# Patient Record
Sex: MALE | Race: BLACK OR AFRICAN AMERICAN | Employment: FULL TIME | URBAN - METROPOLITAN AREA
[De-identification: names, ages, dates, MRNs, and addresses within clinical notes are randomized per-mention and may not be internally consistent; named-entity substitution may affect disease eponyms.]

---

## 2017-01-19 ENCOUNTER — ED HISTORICAL/CONVERTED ENCOUNTER (OUTPATIENT)
Dept: OTHER | Age: 57
End: 2017-01-19

## 2017-05-26 ENCOUNTER — OP HISTORICAL/CONVERTED ENCOUNTER (OUTPATIENT)
Dept: OTHER | Age: 57
End: 2017-05-26

## 2017-09-08 ENCOUNTER — OP HISTORICAL/CONVERTED ENCOUNTER (OUTPATIENT)
Dept: OTHER | Age: 57
End: 2017-09-08

## 2018-05-01 ENCOUNTER — OP HISTORICAL/CONVERTED ENCOUNTER (OUTPATIENT)
Dept: OTHER | Age: 58
End: 2018-05-01

## 2018-06-05 ENCOUNTER — OP HISTORICAL/CONVERTED ENCOUNTER (OUTPATIENT)
Dept: OTHER | Age: 58
End: 2018-06-05

## 2018-06-21 ENCOUNTER — OP HISTORICAL/CONVERTED ENCOUNTER (OUTPATIENT)
Dept: OTHER | Age: 58
End: 2018-06-21

## 2018-06-27 ENCOUNTER — IP HISTORICAL/CONVERTED ENCOUNTER (OUTPATIENT)
Dept: OTHER | Age: 58
End: 2018-06-27

## 2018-07-18 ENCOUNTER — OP HISTORICAL/CONVERTED ENCOUNTER (OUTPATIENT)
Dept: OTHER | Age: 58
End: 2018-07-18

## 2019-01-04 ENCOUNTER — OP HISTORICAL/CONVERTED ENCOUNTER (OUTPATIENT)
Dept: OTHER | Age: 59
End: 2019-01-04

## 2019-02-19 ENCOUNTER — OP HISTORICAL/CONVERTED ENCOUNTER (OUTPATIENT)
Dept: OTHER | Age: 59
End: 2019-02-19

## 2020-03-26 ENCOUNTER — OP HISTORICAL/CONVERTED ENCOUNTER (OUTPATIENT)
Dept: OTHER | Age: 60
End: 2020-03-26

## 2020-08-26 PROBLEM — K21.9 GERD (GASTROESOPHAGEAL REFLUX DISEASE): Status: ACTIVE | Noted: 2020-08-26

## 2020-08-26 PROBLEM — R07.89 ATYPICAL CHEST PAIN: Status: ACTIVE | Noted: 2020-08-26

## 2020-08-26 PROBLEM — K22.9 DISORDER OF ESOPHAGUS: Status: ACTIVE | Noted: 2020-08-26

## 2020-08-26 PROBLEM — R60.9 EDEMA: Status: ACTIVE | Noted: 2020-08-26

## 2020-08-26 PROBLEM — I11.9 HYPERTENSIVE HEART DISEASE: Status: ACTIVE | Noted: 2020-08-26

## 2020-08-26 PROBLEM — B35.0 TINEA BARBAE: Status: ACTIVE | Noted: 2020-08-26

## 2020-08-26 PROBLEM — I25.10 CORONARY ARTERIOSCLEROSIS IN NATIVE ARTERY: Status: ACTIVE | Noted: 2020-08-26

## 2020-08-26 PROBLEM — E78.00 HYPERCHOLESTEROLEMIA: Status: ACTIVE | Noted: 2020-08-26

## 2020-08-26 PROBLEM — I10 ESSENTIAL HYPERTENSION: Status: ACTIVE | Noted: 2020-08-26

## 2020-08-26 PROBLEM — L25.9 CONTACT DERMATITIS: Status: ACTIVE | Noted: 2020-08-26

## 2020-08-26 PROBLEM — K59.00 CONSTIPATION: Status: ACTIVE | Noted: 2020-08-26

## 2020-08-26 PROBLEM — E55.9 VITAMIN D DEFICIENCY: Status: ACTIVE | Noted: 2020-08-26

## 2020-08-26 PROBLEM — M76.899 ENTHESOPATHY OF KNEE: Status: ACTIVE | Noted: 2020-08-26

## 2020-08-26 PROBLEM — E66.9 OBESITY: Status: ACTIVE | Noted: 2020-08-26

## 2020-08-27 ENCOUNTER — OFFICE VISIT (OUTPATIENT)
Dept: INTERNAL MEDICINE CLINIC | Age: 60
End: 2020-08-27
Payer: COMMERCIAL

## 2020-08-27 VITALS
HEART RATE: 67 BPM | SYSTOLIC BLOOD PRESSURE: 132 MMHG | OXYGEN SATURATION: 97 % | HEIGHT: 73 IN | WEIGHT: 295 LBS | BODY MASS INDEX: 39.1 KG/M2 | RESPIRATION RATE: 18 BRPM | TEMPERATURE: 98.1 F | DIASTOLIC BLOOD PRESSURE: 80 MMHG

## 2020-08-27 DIAGNOSIS — E66.3 OVERWEIGHT: ICD-10-CM

## 2020-08-27 DIAGNOSIS — I65.23 BILATERAL CAROTID ARTERY STENOSIS: Primary | ICD-10-CM

## 2020-08-27 DIAGNOSIS — R10.13 DYSPEPSIA: ICD-10-CM

## 2020-08-27 DIAGNOSIS — M54.12 CERVICAL RADICULAR PAIN: ICD-10-CM

## 2020-08-27 DIAGNOSIS — E78.00 PURE HYPERCHOLESTEROLEMIA: ICD-10-CM

## 2020-08-27 DIAGNOSIS — L30.8 OTHER ECZEMA: ICD-10-CM

## 2020-08-27 DIAGNOSIS — K59.09 CHRONIC CONSTIPATION: ICD-10-CM

## 2020-08-27 PROCEDURE — 99214 OFFICE O/P EST MOD 30 MIN: CPT | Performed by: INTERNAL MEDICINE

## 2020-08-27 RX ORDER — TIZANIDINE 2 MG/1
2 TABLET ORAL 2 TIMES DAILY
Qty: 30 TAB | Refills: 0 | Status: SHIPPED | OUTPATIENT
Start: 2020-08-27 | End: 2020-09-04

## 2020-08-27 RX ORDER — DICLOFENAC SODIUM 75 MG/1
75 TABLET, DELAYED RELEASE ORAL 2 TIMES DAILY
Qty: 30 TAB | Refills: 0 | Status: SHIPPED | OUTPATIENT
Start: 2020-08-27 | End: 2020-09-11

## 2020-08-27 RX ORDER — PANTOPRAZOLE SODIUM 40 MG/1
40 TABLET, DELAYED RELEASE ORAL DAILY
Qty: 90 TAB | Refills: 1 | Status: SHIPPED | OUTPATIENT
Start: 2020-08-27 | End: 2021-03-01

## 2020-08-27 NOTE — PROGRESS NOTES
Susu Yee is a 63 y.o. male and presents with Hypertension  Patient presents for follow-up has chronic constipation but does take Colace MiraLAX daily every Sunday and Thursday takes amities a 24 mcg along with Linzess 290 mcg when needed which is often for constant constipation it helps he is tries to drink plenty of fluids he does not smoke cigarettes he continues on atorvastatin bare aspirin a steroid cream for eczema around the scalp he has lost 6 to 7 pounds volitionally and will continue to watch his diet has pain along the lateral C-spine left side with symptoms of cervical radiculitis down the anterior shoulder seems to be worse with certain movements of the neck or arm he did not want MRI imaging has agreed to a trial of medication noted below local heat and rest also noted a vague tingling sensation in the right face upper anterior chest and neck area which he feels may be related to his known carotid arterial sclerosis he has stenosis significant on the right and was concerned about the left he is agreeable for repeat carotid duplex scanning I could not hear audible bruit he is a non-smoker he continues to be employed blood pressure 138/80 right arm O2 sat 97% pulse of 68 and regular continues with pantoprazole and needed a refill no longer takes tramadol continues on chlorthalidone carvedilol Plavix folic acid hydroxyzine lungs clear heart rate regular rate and rhythm very pleasant good peripheral pulses no gross lateralizing neurologic findings           Review of Systems  Constitutional: negative for fevers, chills, anorexia, weight loss and fatigue,no insomnia  Eyes:   negative for visual disturbance and irritation, eye discharge, eye pain. no eye redness. ENT:   negative for tinnitus, sore throat, nasal congestion, ear pain, hoarseness, hearing loss.,no snoring.   Respiratory:  negative for cough, hemoptysis, shortness of breath, wheezing,  CV:   negative for chest pain, palpitations, lower extremity edema, shortness of breath while sitting, walking or at night  GI:   negative for nausea, vomiting, diarrhea, abdominal pain,melena,noted constipation. Endo:               negative for polyuria, polydipsia, polyphagia, cold or heat intolerance,hair loss. Genitourinary: negative for frequency, dysuria and hematuria,urethral discharge,nocturia.straining while urination,urinary incontinence. Integument:  negative for rash and pruritus  Hematologic:  negative for easy bruising and gum/nose bleeding, enlarged nodes  Musculoskel: negative for myalgias, arthralgias, back pain, muscle weakness, joint pain, h/o fall,cramps,calf pain. cervical neck pain    Neurological:  negative for headaches, dizziness, vertigo, memory problems, gait and seizures loss of consciousness,no ataxia. -paresthesias of face  Behavl/Psych: negative for feelings of anxiety, depression, mood changes ,sadness    Past Medical History:   Diagnosis Date    Atypical chest pain 8/26/2020    Constipation 8/26/2020    Contact dermatitis 8/26/2020    Coronary arteriosclerosis in native artery 8/26/2020    Disorder of esophagus 8/26/2020    Edema 8/26/2020    Enthesopathy of knee 8/26/2020    Essential hypertension 8/26/2020    GERD (gastroesophageal reflux disease) 8/26/2020    Hypercholesterolemia 8/26/2020    Hypertensive heart disease 8/26/2020    Obesity 8/26/2020    Tinea barbae 8/26/2020    Vitamin D deficiency 8/26/2020     Past Surgical History:   Procedure Laterality Date    CARDIAC SURG PROCEDURE UNLIST      cardiac stent placement    HX HERNIA REPAIR      HX ORTHOPAEDIC      procedure on knee     Social History     Socioeconomic History    Marital status:      Spouse name: Not on file    Number of children: Not on file    Years of education: Not on file    Highest education level: Not on file   Tobacco Use    Smoking status: Former Smoker     Packs/day: 1.00     Years: 40.00     Pack years: 40.00    Smokeless tobacco: Never Used   Substance and Sexual Activity    Alcohol use: Not Currently     Family History   Problem Relation Age of Onset    Hypertension Mother     Hypertension Father        No Known Allergies    Objective:  Visit Vitals  /80 (BP 1 Location: Right arm, BP Patient Position: Sitting)   Pulse 67   Temp 98.1 °F (36.7 °C) (Oral)   Resp 18   Ht 6' 1\" (1.854 m)   Wt 295 lb (133.8 kg)   SpO2 97%   BMI 38.92 kg/m²       Physical Exam:   Constitutional: General Appearance: healthy-appearing and obese. Level of Distress: NAD. Ambulation: ambulating normally. overweight  Psychiatric: Mental Status: normal mood and affect and active and alert. Orientation: to time, place, and person. no agitation. ,normal eye contact. normal insight  Head: Head: normocephalic and atraumatic. Eyes: Pupils: PERRLA. Sclerae: non-icteric. ENMT: No lesions on external ear, no hearing loss. No lesions on external nose, sinus tenderness, or nasal discharge. Lips, Teeth, and no mouth or lip ulcers   Neck: Neck: supple, trachea midline, and no masses. Lymph Nodes: no cervical LAD. Thyroid: no enlargement or nodules and non-tender. Lungs: Respiratory effort: no dyspnea. Auscultation: no wheezing, rales/crackles, or rhonchi and breath sounds normal and good air movement. Breast:   Cardiovascular: Apical Impulse: not displaced. Heart Auscultation: normal S1 and S2; no murmurs, rubs, or gallops; and RRR. Neck vessels: no carotid bruits. Pulses including femoral / pedal: normal throughout. Abdomen: Bowel Sounds: normal. Inspection and Palpation: no tenderness, guarding, or masses and soft and non-distended. Liver: non-tender and no hepatomegaly. Spleen: non-tender and no splenomegaly. :    Rectal Exam:  Musculoskeletal[de-identified] Extremities: no edema or varicosities. Calf tenderness. djd changes  Neurologic: Gait and Station: normal gait and station. Motor Strength normal right and left. Sensory and cerebellar intact.   Skin: Inspection and palpation: no rash, lesions, or ulcer. eczema      No results found for this or any previous visit. Assessment/Plan:    ICD-10-CM ICD-9-CM    1. Bilateral carotid artery stenosis  I65.23 433.10 DUPLEX CAROTID BILATERAL     433.30    2. Cervical radicular pain  M54.12 723.4    3. Overweight  E66.3 278.02    4. Other eczema  L30.8 692.9    5. Pure hypercholesterolemia  E78.00 272.0    6. Chronic constipation  K59.09 564.00    7. Dyspepsia  R10.13 536.8      Orders Placed This Encounter    DUPLEX CAROTID BILATERAL     Standing Status:   Future     Standing Expiration Date:   2/27/2021    diclofenac EC (VOLTAREN) 75 mg EC tablet     Sig: Take 1 Tab by mouth two (2) times a day for 15 days. Dispense:  30 Tab     Refill:  0    tiZANidine (ZANAFLEX) 2 mg tablet     Sig: Take 1 Tab by mouth two (2) times a day for 15 days. Dispense:  30 Tab     Refill:  0    pantoprazole (PROTONIX) 40 mg tablet     Sig: Take 1 Tab by mouth daily for 90 days. Dispense:  90 Tab     Refill:  1       follow low fat diet, continue present plan, call if any problems    There are no Patient Instructions on file for this visit. Follow-up and Dispositions    · Return in about 6 months (around 2/27/2021).

## 2020-09-04 RX ORDER — TIZANIDINE 2 MG/1
TABLET ORAL
Qty: 30 TAB | Refills: 0 | Status: SHIPPED | OUTPATIENT
Start: 2020-09-04 | End: 2020-10-01 | Stop reason: SDUPTHER

## 2020-09-22 DIAGNOSIS — E87.6 HYPOKALEMIA: ICD-10-CM

## 2020-09-22 RX ORDER — POTASSIUM CHLORIDE 750 MG/1
CAPSULE, EXTENDED RELEASE ORAL
Qty: 90 CAP | Refills: 1 | Status: SHIPPED | OUTPATIENT
Start: 2020-09-22 | End: 2021-03-31

## 2020-09-30 ENCOUNTER — TELEPHONE (OUTPATIENT)
Dept: INTERNAL MEDICINE CLINIC | Age: 60
End: 2020-09-30

## 2020-09-30 DIAGNOSIS — M54.12 CERVICAL RADICULAR PAIN: Primary | ICD-10-CM

## 2020-09-30 NOTE — TELEPHONE ENCOUNTER
Needs a 90 day supply of tizanidine HCL 2mg tab     Quantity: 180.0     Directions for use:  Take 1 tablet po BID

## 2020-10-01 RX ORDER — TIZANIDINE 2 MG/1
TABLET ORAL
Qty: 60 TAB | Refills: 0 | Status: SHIPPED | OUTPATIENT
Start: 2020-10-01 | End: 2022-05-05 | Stop reason: CLARIF

## 2020-12-21 RX ORDER — CLOPIDOGREL BISULFATE 75 MG/1
TABLET ORAL
Qty: 90 TAB | Refills: 1 | Status: SHIPPED | OUTPATIENT
Start: 2020-12-21 | End: 2021-08-27

## 2021-01-01 RX ORDER — ATORVASTATIN CALCIUM 80 MG/1
TABLET, FILM COATED ORAL
Qty: 90 TAB | Refills: 1 | Status: SHIPPED | OUTPATIENT
Start: 2021-01-01 | End: 2021-07-08

## 2021-01-01 RX ORDER — CHLORTHALIDONE 25 MG/1
TABLET ORAL
Qty: 90 TAB | Refills: 1 | Status: SHIPPED | OUTPATIENT
Start: 2021-01-01 | End: 2021-10-11

## 2021-03-01 RX ORDER — PANTOPRAZOLE SODIUM 40 MG/1
TABLET, DELAYED RELEASE ORAL
Qty: 90 TAB | Refills: 1 | Status: SHIPPED | OUTPATIENT
Start: 2021-03-01 | End: 2021-04-30 | Stop reason: SDUPTHER

## 2021-03-26 ENCOUNTER — TELEPHONE (OUTPATIENT)
Dept: INTERNAL MEDICINE CLINIC | Age: 61
End: 2021-03-26

## 2021-03-26 DIAGNOSIS — L50.9 HIVES: Primary | ICD-10-CM

## 2021-03-26 RX ORDER — CYPROHEPTADINE HYDROCHLORIDE 4 MG/1
4 TABLET ORAL
Qty: 21 TAB | Refills: 0 | Status: SHIPPED | OUTPATIENT
Start: 2021-03-26 | End: 2021-04-16

## 2021-03-26 RX ORDER — DEXAMETHASONE 1 MG/1
TABLET ORAL
Qty: 6 TAB | Refills: 0 | Status: SHIPPED | OUTPATIENT
Start: 2021-03-26 | End: 2022-05-05 | Stop reason: CLARIF

## 2021-03-26 NOTE — TELEPHONE ENCOUNTER
Patient the covid vaccine a few days ago and now has hives. Is taking Benadryl, wants to know if there is anything he should be taking? Please advise.

## 2021-03-31 ENCOUNTER — OFFICE VISIT (OUTPATIENT)
Dept: INTERNAL MEDICINE CLINIC | Age: 61
End: 2021-03-31
Payer: COMMERCIAL

## 2021-03-31 VITALS
HEART RATE: 72 BPM | OXYGEN SATURATION: 99 % | WEIGHT: 307.6 LBS | BODY MASS INDEX: 40.77 KG/M2 | TEMPERATURE: 98.5 F | RESPIRATION RATE: 18 BRPM | SYSTOLIC BLOOD PRESSURE: 156 MMHG | HEIGHT: 73 IN | DIASTOLIC BLOOD PRESSURE: 74 MMHG

## 2021-03-31 DIAGNOSIS — R21 SKIN ERUPTION: ICD-10-CM

## 2021-03-31 DIAGNOSIS — I25.10 CORONARY ARTERIOSCLEROSIS IN NATIVE ARTERY: ICD-10-CM

## 2021-03-31 DIAGNOSIS — E66.3 OVERWEIGHT: ICD-10-CM

## 2021-03-31 DIAGNOSIS — E87.6 HYPOKALEMIA: ICD-10-CM

## 2021-03-31 DIAGNOSIS — I10 ESSENTIAL HYPERTENSION: Primary | ICD-10-CM

## 2021-03-31 DIAGNOSIS — K59.09 CHRONIC CONSTIPATION: ICD-10-CM

## 2021-03-31 DIAGNOSIS — E78.00 PURE HYPERCHOLESTEROLEMIA: ICD-10-CM

## 2021-03-31 PROCEDURE — 99214 OFFICE O/P EST MOD 30 MIN: CPT | Performed by: INTERNAL MEDICINE

## 2021-03-31 RX ORDER — POTASSIUM CHLORIDE 750 MG/1
CAPSULE, EXTENDED RELEASE ORAL
Qty: 90 CAP | Refills: 1 | Status: SHIPPED | OUTPATIENT
Start: 2021-03-31 | End: 2022-02-22 | Stop reason: SDUPTHER

## 2021-03-31 NOTE — PROGRESS NOTES
Carrie Parry is a 57 y.o. male and presents with Rash (has had a rash since taking the mad[place] covid shot)  Patient presents for routine visit has slightly pruritic eczematous type rash over the trunk back buttocks arms legs 4 days after the Glendy Ovens first vaccine he does not have fever chills does not feel bad it may be temporally related to the constituents of the vaccine he has been using some hydroxyzine which she has for hives and that seems to help the itch and he did not want to take any other medication we discussed steroids montelukast etc. he will let me know if he develops worsening rash or discomfort fever in fact over the last few days his rash is dissipating seem to be slightly raised erythematous. He is not due for his second [place] vaccine until April 26. Lungs are clear heart rate regular rate and rhythm he has seen Dr. Kim Purcell gastroenterologist about a month ago and had a PillCam performed but has not heard from Dr. Berny Contreras a while as to the results and was concerned he does have chronic constipation which is helped with those medications he currently takes. He continues on atorvastatin for hypercholesterolemia he will try to continue to lose weight although it has been difficult bilateral blood pressure resting 130/80 left arm otherwise he is doing fairly well           Review of Systems  Constitutional: negative for fevers, chills, anorexia, weight loss and fatigue,no insomnia  Eyes:   negative for visual disturbance and irritation, eye discharge, eye pain. no eye redness. ENT:   negative for tinnitus, sore throat, nasal congestion, ear pain, hoarseness, hearing loss.,no snoring. Respiratory:  negative for cough, hemoptysis, shortness of breath, wheezing,  CV:   negative for chest pain, palpitations, lower extremity edema, shortness of breath while sitting, walking or at night  GI:   negative for nausea, vomiting, diarrhea, abdominal pain,melena,constipation.   Endo:               negative for polyuria, polydipsia, polyphagia, cold or heat intolerance,hair loss. Genitourinary: negative for frequency, dysuria and hematuria,urethral discharge,nocturia.straining while urination,urinary incontinence. Integument:  Noted for rash and pruritus skin eruption  Hematologic:  negative for easy bruising and gum/nose bleeding, enlarged nodes  Musculoskel: negative for myalgias, arthralgias, back pain, muscle weakness, joint pain, h/o fall,cramps,calf pain. DJD  Neurological:  negative for headaches, dizziness, vertigo, memory problems, gait and seizures loss of consciousness,no ataxia.   Behavl/Psych: negative for feelings of anxiety, depression, mood changes ,sadness    Past Medical History:   Diagnosis Date    Atypical chest pain 2020    Constipation 2020    Contact dermatitis 2020    Coronary arteriosclerosis in native artery 2020    Disorder of esophagus 2020    Edema 2020    Enthesopathy of knee 2020    Essential hypertension 2020    GERD (gastroesophageal reflux disease) 2020    Hypercholesterolemia 2020    Hypertensive heart disease 2020    Obesity 2020    Tinea barbae 2020    Vitamin D deficiency 2020     Past Surgical History:   Procedure Laterality Date    HX HERNIA REPAIR      HX ORTHOPAEDIC      procedure on knee    [place] CARDIAC SURG PROCEDURE UNLIST      cardiac stent placement     Social History     Socioeconomic History    Marital status:      Spouse name: Not on file    Number of children: Not on file    Years of education: Not on file    Highest education level: Not on file   Tobacco Use    Smoking status: Former Smoker     Packs/day: 1.00     Years: 40.00     Pack years: 40.00     Quit date:      Years since quittin.2    Smokeless tobacco: Never Used   Substance and Sexual Activity    Alcohol use: Not Currently     Family History   Problem Relation Age of Onset    Hypertension Mother  Hypertension Father      Current Outpatient Medications   Medication Sig Dispense Refill    potassium chloride SA (MICRO-K) 10 mEq capsule TAKE 1 CAPSULE BY MOUTH EVERY DAY 90 Cap 1    cyproheptadine (PERIACTIN) 4 mg tablet Take 1 Tab by mouth three (3) times daily as needed (as needed) for up to 21 days. 21 Tab 0    dexAMETHasone (DECADRON) 1 mg tablet 1 tid for 3 days 6 Tab 0    pantoprazole (PROTONIX) 40 mg tablet TAKE 1 TABLET BY MOUTH EVERY DAY 90 Tab 1    chlorthalidone (HYGROTON) 25 mg tablet TAKE 1 TABLET BY MOUTH DAILY IN THE MORNING 90 Tab 1    atorvastatin (LIPITOR) 80 mg tablet TAKE 1 TABLET DAILY 90 Tab 1    clopidogreL (PLAVIX) 75 mg tab TAKE 1 TABLET BY MOUTH EVERY DAY 90 Tab 1    tiZANidine (ZANAFLEX) 2 mg tablet TAKE 1 TABLET BY MOUTH TWICE A DAY 60 Tab 0     No Known Allergies    Objective:  Visit Vitals  BP (!) 156/74 (BP 1 Location: Left arm, BP Patient Position: Sitting, BP Cuff Size: Large adult)   Pulse 72   Temp 98.5 °F (36.9 °C) (Oral)   Resp 18   Ht 6' 1\" (1.854 m)   Wt 307 lb 9.6 oz (139.5 kg)   SpO2 99%   BMI 40.58 kg/m²       Physical Exam:   Constitutional: General Appearance: healthy-appearing and obese. Level of Distress: NAD. Ambulation: ambulating normally. Overweight for height  Psychiatric: Mental Status: normal mood and affect and active and alert. Orientation: to time, place, and person. no agitation. ,normal eye contact. normal insight  Head: Head: normocephalic and atraumatic. Eyes: Pupils: PERRLA. Sclerae: non-icteric. ENMT: No lesions on external ear, no hearing loss. No lesions on external nose, sinus tenderness, or nasal discharge. Lips, Teeth, and no mouth or lip ulcers   Neck: Neck: supple, trachea midline, and no masses. Lymph Nodes: no cervical LAD. Thyroid: no enlargement or nodules and non-tender. Lungs: Respiratory effort: no dyspnea.  Auscultation: no wheezing, rales/crackles, or rhonchi and breath sounds normal and good air movement. Cardiovascular: Apical Impulse: not displaced. Heart Auscultation: normal S1 and S2; no murmurs, rubs, or gallops; and RRR. Neck vessels: no carotid bruits. Pulses including femoral / pedal: normal throughout. Abdomen: Bowel Sounds: normal. Inspection and Palpation: no tenderness, guarding, or masses and soft and non-distended. Liver: non-tender   Musculoskeletal[de-identified] Extremities: no edema or varicosities. Calf tenderness. DJD  Neurologic: Gait and Station: normal gait and station. Motor Strength normal right and left. Sensory and cerebellar intact. Skin: Inspection and palpation: Noted rash, lesions, or ulcer. No new skin eruption as above      No results found for this or any previous visit. Assessment/Plan:    ICD-10-CM ICD-9-CM    1. Essential hypertension  I10 401.9    2. Coronary arteriosclerosis in native artery  I25.10 414.01    3. Skin eruption  R21 782.1    4. Overweight  E66.3 278.02    5. Chronic constipation  K59.09 564.00    6. Pure hypercholesterolemia  E78.00 272.0      No orders of the defined types were placed in this encounter. call if any problems    There are no Patient Instructions on file for this visit. Follow-up and Dispositions    · Return in about 6 months (around 9/30/2021).

## 2021-03-31 NOTE — PROGRESS NOTES
1. Have you been to the ER, urgent care clinic since your last visit? Hospitalized since your last visit? No    2. Have you seen or consulted any other health care providers outside of the [address] [place] [address] since your last visit? Include any pap smears or colon screening.  No     Chief Complaint   Patient presents with    Rash     has had a rash since taking the maderna covid shot     Visit Vitals  BP (!) 158/74 (BP 1 Location: Left arm, BP Patient Position: Sitting, BP Cuff Size: Adult)   Pulse 72   Temp 98.5 °F (36.9 °C) (Oral)   Resp 18   Ht 6' 1\" (1.854 m)   Wt 307 lb 9.6 oz (139.5 kg)   SpO2 99%   BMI 40.58 kg/m²

## 2021-04-30 RX ORDER — PANTOPRAZOLE SODIUM 40 MG/1
TABLET, DELAYED RELEASE ORAL
Qty: 90 TAB | Refills: 1 | Status: SHIPPED | OUTPATIENT
Start: 2021-04-30 | End: 2022-02-05

## 2021-05-24 DIAGNOSIS — K59.00 CONSTIPATION, UNSPECIFIED: ICD-10-CM

## 2021-05-24 RX ORDER — LUBIPROSTONE 24 UG/1
24 CAPSULE, GELATIN COATED ORAL 2 TIMES DAILY WITH MEALS
Qty: 180 CAPSULE | Refills: 0 | Status: SHIPPED | OUTPATIENT
Start: 2021-05-24 | End: 2021-08-22

## 2021-07-08 RX ORDER — ATORVASTATIN CALCIUM 80 MG/1
TABLET, FILM COATED ORAL
Qty: 90 TABLET | Refills: 1 | Status: SHIPPED | OUTPATIENT
Start: 2021-07-08 | End: 2022-06-06 | Stop reason: SDUPTHER

## 2021-08-27 RX ORDER — CLOPIDOGREL BISULFATE 75 MG/1
TABLET ORAL
Qty: 90 TABLET | Refills: 1 | Status: SHIPPED | OUTPATIENT
Start: 2021-08-27 | End: 2021-08-31

## 2021-08-31 RX ORDER — CLOPIDOGREL BISULFATE 75 MG/1
TABLET ORAL
Qty: 90 TABLET | Refills: 1 | Status: SHIPPED | OUTPATIENT
Start: 2021-08-31 | End: 2021-12-03 | Stop reason: SDUPTHER

## 2021-10-11 RX ORDER — CHLORTHALIDONE 25 MG/1
TABLET ORAL
Qty: 90 TABLET | Refills: 1 | Status: SHIPPED | OUTPATIENT
Start: 2021-10-11 | End: 2022-04-18 | Stop reason: SDUPTHER

## 2021-10-11 RX ORDER — LUBIPROSTONE 24 UG/1
CAPSULE, GELATIN COATED ORAL
Qty: 180 CAPSULE | Refills: 1 | Status: SHIPPED | OUTPATIENT
Start: 2021-10-11

## 2021-11-23 DIAGNOSIS — K59.00 CONSTIPATION, UNSPECIFIED: ICD-10-CM

## 2021-11-23 RX ORDER — LINACLOTIDE 290 UG/1
CAPSULE, GELATIN COATED ORAL
Qty: 30 CAPSULE | Refills: 2 | Status: SHIPPED | OUTPATIENT
Start: 2021-11-23 | End: 2022-03-08 | Stop reason: SDUPTHER

## 2021-12-03 ENCOUNTER — TELEPHONE (OUTPATIENT)
Dept: INTERNAL MEDICINE CLINIC | Age: 61
End: 2021-12-03

## 2021-12-03 RX ORDER — CLOPIDOGREL BISULFATE 75 MG/1
75 TABLET ORAL DAILY
Qty: 90 TABLET | Refills: 1 | Status: SHIPPED | OUTPATIENT
Start: 2021-12-03 | End: 2022-06-02

## 2021-12-03 NOTE — TELEPHONE ENCOUNTER
Patient's wife called requesting a refill for Mr. Yandy Frances for the following medication:    clopidogreL (PLAVIX) 75 mg tab 90 Tablet 1 8/31/2021     Sig: TAKE 1 TABLET BY MOUTH EVERY DAY

## 2022-02-05 RX ORDER — PANTOPRAZOLE SODIUM 40 MG/1
TABLET, DELAYED RELEASE ORAL
Qty: 90 TABLET | Refills: 1 | Status: SHIPPED | OUTPATIENT
Start: 2022-02-05 | End: 2022-08-23 | Stop reason: SDUPTHER

## 2022-02-22 ENCOUNTER — TELEPHONE (OUTPATIENT)
Dept: INTERNAL MEDICINE CLINIC | Age: 62
End: 2022-02-22

## 2022-02-22 DIAGNOSIS — E87.6 HYPOKALEMIA: ICD-10-CM

## 2022-02-22 RX ORDER — POTASSIUM CHLORIDE 750 MG/1
10 CAPSULE, EXTENDED RELEASE ORAL DAILY
Qty: 90 CAPSULE | Refills: 1 | Status: SHIPPED | OUTPATIENT
Start: 2022-02-22 | End: 2022-08-23 | Stop reason: SDUPTHER

## 2022-02-22 NOTE — TELEPHONE ENCOUNTER
Patient's wife called for her  to request refill on Potassium 10mg with a 90 day supply sent to [facility] on [address]

## 2022-03-07 ENCOUNTER — TELEPHONE (OUTPATIENT)
Dept: INTERNAL MEDICINE CLINIC | Age: 62
End: 2022-03-07

## 2022-03-07 NOTE — TELEPHONE ENCOUNTER
Former Dr. Jasmyne Chance patient    [facility] faxed request for    Linzess 290 mcg cap  Take 1 cap by mouth every day as needed    LOV 3/31/2021 Sean MALONEY 5/25/2022 Zeny Burgos

## 2022-03-08 DIAGNOSIS — K59.00 CONSTIPATION, UNSPECIFIED: ICD-10-CM

## 2022-03-18 PROBLEM — K59.00 CONSTIPATION: Status: ACTIVE | Noted: 2020-08-26

## 2022-03-18 PROBLEM — M76.899 ENTHESOPATHY OF KNEE: Status: ACTIVE | Noted: 2020-08-26

## 2022-03-18 PROBLEM — R60.9 EDEMA: Status: ACTIVE | Noted: 2020-08-26

## 2022-03-19 PROBLEM — E55.9 VITAMIN D DEFICIENCY: Status: ACTIVE | Noted: 2020-08-26

## 2022-03-19 PROBLEM — R07.89 ATYPICAL CHEST PAIN: Status: ACTIVE | Noted: 2020-08-26

## 2022-03-19 PROBLEM — L25.9 CONTACT DERMATITIS: Status: ACTIVE | Noted: 2020-08-26

## 2022-03-19 PROBLEM — K22.9 DISORDER OF ESOPHAGUS: Status: ACTIVE | Noted: 2020-08-26

## 2022-03-19 PROBLEM — I25.10 CORONARY ARTERIOSCLEROSIS IN NATIVE ARTERY: Status: ACTIVE | Noted: 2020-08-26

## 2022-03-19 PROBLEM — E66.9 OBESITY: Status: ACTIVE | Noted: 2020-08-26

## 2022-03-19 PROBLEM — I10 ESSENTIAL HYPERTENSION: Status: ACTIVE | Noted: 2020-08-26

## 2022-03-19 PROBLEM — E78.00 HYPERCHOLESTEROLEMIA: Status: ACTIVE | Noted: 2020-08-26

## 2022-03-19 PROBLEM — K21.9 GERD (GASTROESOPHAGEAL REFLUX DISEASE): Status: ACTIVE | Noted: 2020-08-26

## 2022-03-19 PROBLEM — B35.0 TINEA BARBAE: Status: ACTIVE | Noted: 2020-08-26

## 2022-03-20 PROBLEM — I11.9 HYPERTENSIVE HEART DISEASE: Status: ACTIVE | Noted: 2020-08-26

## 2022-04-18 ENCOUNTER — TELEPHONE (OUTPATIENT)
Dept: INTERNAL MEDICINE CLINIC | Age: 62
End: 2022-04-18

## 2022-04-18 RX ORDER — CHLORTHALIDONE 25 MG/1
25 TABLET ORAL DAILY
Qty: 60 TABLET | Refills: 0 | Status: SHIPPED | OUTPATIENT
Start: 2022-04-18 | End: 2022-06-06 | Stop reason: SDUPTHER

## 2022-05-05 ENCOUNTER — HOSPITAL ENCOUNTER (OUTPATIENT)
Dept: PREADMISSION TESTING | Age: 62
Discharge: HOME | End: 2022-05-05
Payer: COMMERCIAL

## 2022-05-05 VITALS
HEIGHT: 72 IN | HEART RATE: 63 BPM | OXYGEN SATURATION: 99 % | RESPIRATION RATE: 18 BRPM | TEMPERATURE: 98.1 F | DIASTOLIC BLOOD PRESSURE: 95 MMHG | SYSTOLIC BLOOD PRESSURE: 178 MMHG | WEIGHT: 300.4 LBS | BODY MASS INDEX: 40.69 KG/M2

## 2022-05-05 LAB
ALBUMIN SERPL-MCNC: 3.6 G/DL (ref 3.5–5)
ALBUMIN/GLOB SERPL: 1 {RATIO} (ref 1.1–2.2)
ALP SERPL-CCNC: 89 U/L (ref 45–117)
ALT SERPL-CCNC: 25 U/L (ref 12–78)
ANION GAP SERPL CALC-SCNC: 8 MMOL/L (ref 5–15)
AST SERPL W P-5'-P-CCNC: 27 U/L (ref 15–37)
BILIRUB SERPL-MCNC: 0.5 MG/DL (ref 0.2–1)
BUN SERPL-MCNC: 10 MG/DL (ref 6–20)
BUN/CREAT SERPL: 10 (ref 12–20)
CA-I BLD-MCNC: 9.5 MG/DL (ref 8.5–10.1)
CHLORIDE SERPL-SCNC: 103 MMOL/L (ref 97–108)
CHOLEST SERPL-MCNC: 171 MG/DL
CO2 SERPL-SCNC: 25 MMOL/L (ref 21–32)
CREAT SERPL-MCNC: 0.96 MG/DL (ref 0.7–1.3)
ERYTHROCYTE [DISTWIDTH] IN BLOOD BY AUTOMATED COUNT: 18.4 % (ref 11.5–14.5)
GLOBULIN SER CALC-MCNC: 3.6 G/DL (ref 2–4)
GLUCOSE SERPL-MCNC: 142 MG/DL (ref 65–100)
HCT VFR BLD AUTO: 42.4 % (ref 36.6–50.3)
HDLC SERPL-MCNC: 39 MG/DL
HDLC SERPL: 4.4 {RATIO} (ref 0–5)
HGB BLD-MCNC: 13.1 G/DL (ref 12.1–17)
LDLC SERPL CALC-MCNC: 99.8 MG/DL (ref 0–100)
LIPID PROFILE,FLP: ABNORMAL
MCH RBC QN AUTO: 25.8 PG (ref 26–34)
MCHC RBC AUTO-ENTMCNC: 30.9 G/DL (ref 30–36.5)
MCV RBC AUTO: 83.5 FL (ref 80–99)
NRBC # BLD: 0 K/UL (ref 0–0.01)
NRBC BLD-RTO: 0 PER 100 WBC
PLATELET # BLD AUTO: 149 K/UL (ref 150–400)
POTASSIUM SERPL-SCNC: 4.1 MMOL/L (ref 3.5–5.1)
PROT SERPL-MCNC: 7.2 G/DL (ref 6.4–8.2)
RBC # BLD AUTO: 5.08 M/UL (ref 4.1–5.7)
SODIUM SERPL-SCNC: 136 MMOL/L (ref 136–145)
TRIGL SERPL-MCNC: 161 MG/DL (ref ?–150)
VLDLC SERPL CALC-MCNC: 32.2 MG/DL
WBC # BLD AUTO: 5 K/UL (ref 4.1–11.1)

## 2022-05-05 PROCEDURE — 80053 COMPREHEN METABOLIC PANEL: CPT

## 2022-05-05 PROCEDURE — 85027 COMPLETE CBC AUTOMATED: CPT

## 2022-05-05 PROCEDURE — 80061 LIPID PANEL: CPT

## 2022-05-05 PROCEDURE — 36415 COLL VENOUS BLD VENIPUNCTURE: CPT

## 2022-05-05 RX ORDER — CARVEDILOL 25 MG/1
25 TABLET ORAL 2 TIMES DAILY
COMMUNITY
Start: 2022-02-14

## 2022-05-05 RX ORDER — FOLIC ACID 1 MG/1
1 TABLET ORAL DAILY
COMMUNITY

## 2022-05-05 RX ORDER — LANOLIN ALCOHOL/MO/W.PET/CERES
1000 CREAM (GRAM) TOPICAL DAILY
COMMUNITY

## 2022-05-05 RX ORDER — HYDROXYZINE 25 MG/1
25 TABLET, FILM COATED ORAL
COMMUNITY
End: 2022-09-20 | Stop reason: SDUPTHER

## 2022-05-05 RX ORDER — ASPIRIN 81 MG/1
1 TABLET ORAL DAILY
COMMUNITY
Start: 2021-08-05

## 2022-05-05 RX ORDER — HYDROXYZINE HYDROCHLORIDE 10 MG/1
10 TABLET, FILM COATED ORAL
COMMUNITY
End: 2022-08-24 | Stop reason: SDUPTHER

## 2022-05-06 ENCOUNTER — HOSPITAL ENCOUNTER (OUTPATIENT)
Age: 62
Discharge: HOME | End: 2022-05-06
Attending: INTERNAL MEDICINE | Admitting: INTERNAL MEDICINE
Payer: COMMERCIAL

## 2022-05-06 VITALS
HEIGHT: 73 IN | BODY MASS INDEX: 39.76 KG/M2 | WEIGHT: 300 LBS | RESPIRATION RATE: 18 BRPM | HEART RATE: 73 BPM | SYSTOLIC BLOOD PRESSURE: 148 MMHG | OXYGEN SATURATION: 98 % | TEMPERATURE: 98.6 F | DIASTOLIC BLOOD PRESSURE: 83 MMHG

## 2022-05-06 DIAGNOSIS — I25.10 CORONARY ARTERY DISEASE INVOLVING NATIVE HEART WITHOUT ANGINA PECTORIS, UNSPECIFIED VESSEL OR LESION TYPE: ICD-10-CM

## 2022-05-06 PROBLEM — R07.9 CHEST PAIN: Status: ACTIVE | Noted: 2022-05-06

## 2022-05-06 LAB
ACT BLD: 233 SEC (ref 74–125)
ATRIAL RATE: 70 BPM
CALCULATED P AXIS, ECG09: 66 DEGREES
CALCULATED R AXIS, ECG10: -23 DEGREES
CALCULATED T AXIS, ECG11: -13 DEGREES
DIAGNOSIS, 93000: NORMAL
GLUCOSE BLD STRIP.AUTO-MCNC: 133 MG/DL (ref 65–117)
P-R INTERVAL, ECG05: 170 MS
PERFORMED BY, TECHID: ABNORMAL
PERFORMED BY, TECHID: ABNORMAL
Q-T INTERVAL, ECG07: 416 MS
QRS DURATION, ECG06: 108 MS
QTC CALCULATION (BEZET), ECG08: 449 MS
VENTRICULAR RATE, ECG03: 70 BPM

## 2022-05-06 PROCEDURE — 99153 MOD SED SAME PHYS/QHP EA: CPT | Performed by: INTERNAL MEDICINE

## 2022-05-06 PROCEDURE — 76210000025 HC REC RM PH II 3 TO 3.5 HR: Performed by: INTERNAL MEDICINE

## 2022-05-06 PROCEDURE — 93005 ELECTROCARDIOGRAM TRACING: CPT

## 2022-05-06 PROCEDURE — C1894 INTRO/SHEATH, NON-LASER: HCPCS | Performed by: INTERNAL MEDICINE

## 2022-05-06 PROCEDURE — C1760 CLOSURE DEV, VASC: HCPCS | Performed by: INTERNAL MEDICINE

## 2022-05-06 PROCEDURE — 82962 GLUCOSE BLOOD TEST: CPT

## 2022-05-06 PROCEDURE — 74011250636 HC RX REV CODE- 250/636: Performed by: INTERNAL MEDICINE

## 2022-05-06 PROCEDURE — 77030040934 HC CATH DIAG DXTERITY MEDT -A: Performed by: INTERNAL MEDICINE

## 2022-05-06 PROCEDURE — 77030013516 HC DEV INFL ANGI MRTM -B: Performed by: INTERNAL MEDICINE

## 2022-05-06 PROCEDURE — 74011000250 HC RX REV CODE- 250: Performed by: INTERNAL MEDICINE

## 2022-05-06 PROCEDURE — 77030029065 HC DRSG HEMO QCLOT ZMED -B: Performed by: INTERNAL MEDICINE

## 2022-05-06 PROCEDURE — 2709999900 HC NON-CHARGEABLE SUPPLY: Performed by: INTERNAL MEDICINE

## 2022-05-06 PROCEDURE — C1769 GUIDE WIRE: HCPCS | Performed by: INTERNAL MEDICINE

## 2022-05-06 PROCEDURE — 74011250637 HC RX REV CODE- 250/637: Performed by: INTERNAL MEDICINE

## 2022-05-06 PROCEDURE — C1874 STENT, COATED/COV W/DEL SYS: HCPCS | Performed by: INTERNAL MEDICINE

## 2022-05-06 PROCEDURE — 77030019698 HC SYR ANGI MDLON MRTM -A: Performed by: INTERNAL MEDICINE

## 2022-05-06 PROCEDURE — 77030008814 HC VLV ACC PLUS MRTM -B: Performed by: INTERNAL MEDICINE

## 2022-05-06 PROCEDURE — C1887 CATHETER, GUIDING: HCPCS | Performed by: INTERNAL MEDICINE

## 2022-05-06 PROCEDURE — 99152 MOD SED SAME PHYS/QHP 5/>YRS: CPT | Performed by: INTERNAL MEDICINE

## 2022-05-06 PROCEDURE — 93571 IV DOP VEL&/PRESS C FLO 1ST: CPT | Performed by: INTERNAL MEDICINE

## 2022-05-06 PROCEDURE — 77030042317 HC BND COMPR HEMSTAT -B: Performed by: INTERNAL MEDICINE

## 2022-05-06 PROCEDURE — 77030016699 HC CATH ANGI DX INFN1 CARD -A: Performed by: INTERNAL MEDICINE

## 2022-05-06 PROCEDURE — 76210000002 HC OR PH I REC 3 TO 3.5 HR: Performed by: INTERNAL MEDICINE

## 2022-05-06 PROCEDURE — 93458 L HRT ARTERY/VENTRICLE ANGIO: CPT | Performed by: INTERNAL MEDICINE

## 2022-05-06 PROCEDURE — 77030008542 HC TBNG MON PRSS EDWD -A: Performed by: INTERNAL MEDICINE

## 2022-05-06 PROCEDURE — 85347 COAGULATION TIME ACTIVATED: CPT

## 2022-05-06 PROCEDURE — 77030025703 HC SYR ANGI VACLOK MRTM -A: Performed by: INTERNAL MEDICINE

## 2022-05-06 PROCEDURE — 92928 PRQ TCAT PLMT NTRAC ST 1 LES: CPT | Performed by: INTERNAL MEDICINE

## 2022-05-06 PROCEDURE — 77030015766: Performed by: INTERNAL MEDICINE

## 2022-05-06 DEVICE — STENT COR DES 3.50X12MM -- DES RESOLUTE ONYX: Type: IMPLANTABLE DEVICE | Status: FUNCTIONAL

## 2022-05-06 RX ORDER — SODIUM CHLORIDE 9 MG/ML
100 INJECTION, SOLUTION INTRAVENOUS CONTINUOUS
Status: DISCONTINUED | OUTPATIENT
Start: 2022-05-06 | End: 2022-05-06 | Stop reason: HOSPADM

## 2022-05-06 RX ORDER — GUAIFENESIN 100 MG/5ML
81 LIQUID (ML) ORAL DAILY
Status: DISCONTINUED | OUTPATIENT
Start: 2022-05-06 | End: 2022-05-06 | Stop reason: HOSPADM

## 2022-05-06 RX ORDER — HEPARIN SODIUM 200 [USP'U]/100ML
INJECTION, SOLUTION INTRAVENOUS
Status: COMPLETED | OUTPATIENT
Start: 2022-05-06 | End: 2022-05-06

## 2022-05-06 RX ORDER — CLOPIDOGREL 300 MG/1
TABLET, FILM COATED ORAL AS NEEDED
Status: DISCONTINUED | OUTPATIENT
Start: 2022-05-06 | End: 2022-05-06 | Stop reason: HOSPADM

## 2022-05-06 RX ORDER — AMLODIPINE BESYLATE 5 MG/1
2.5 TABLET ORAL
Status: COMPLETED | OUTPATIENT
Start: 2022-05-06 | End: 2022-05-06

## 2022-05-06 RX ORDER — SODIUM CHLORIDE 9 MG/ML
60 INJECTION, SOLUTION INTRAVENOUS CONTINUOUS
Status: DISCONTINUED | OUTPATIENT
Start: 2022-05-06 | End: 2022-05-06 | Stop reason: HOSPADM

## 2022-05-06 RX ORDER — MIDAZOLAM HYDROCHLORIDE 1 MG/ML
INJECTION INTRAMUSCULAR; INTRAVENOUS AS NEEDED
Status: DISCONTINUED | OUTPATIENT
Start: 2022-05-06 | End: 2022-05-06 | Stop reason: HOSPADM

## 2022-05-06 RX ORDER — NALOXONE HYDROCHLORIDE 0.4 MG/ML
0.4 INJECTION, SOLUTION INTRAMUSCULAR; INTRAVENOUS; SUBCUTANEOUS AS NEEDED
Status: DISCONTINUED | OUTPATIENT
Start: 2022-05-06 | End: 2022-05-06 | Stop reason: HOSPADM

## 2022-05-06 RX ORDER — LIDOCAINE HYDROCHLORIDE 10 MG/ML
INJECTION INFILTRATION; PERINEURAL AS NEEDED
Status: DISCONTINUED | OUTPATIENT
Start: 2022-05-06 | End: 2022-05-06 | Stop reason: HOSPADM

## 2022-05-06 RX ORDER — CLOPIDOGREL BISULFATE 75 MG/1
75 TABLET ORAL DAILY
Status: DISCONTINUED | OUTPATIENT
Start: 2022-05-07 | End: 2022-05-06 | Stop reason: HOSPADM

## 2022-05-06 RX ORDER — FENTANYL CITRATE 50 UG/ML
INJECTION, SOLUTION INTRAMUSCULAR; INTRAVENOUS AS NEEDED
Status: DISCONTINUED | OUTPATIENT
Start: 2022-05-06 | End: 2022-05-06 | Stop reason: HOSPADM

## 2022-05-06 RX ORDER — ONDANSETRON 2 MG/ML
4 INJECTION INTRAMUSCULAR; INTRAVENOUS
Status: DISCONTINUED | OUTPATIENT
Start: 2022-05-06 | End: 2022-05-06 | Stop reason: HOSPADM

## 2022-05-06 RX ORDER — VERAPAMIL HYDROCHLORIDE 2.5 MG/ML
INJECTION, SOLUTION INTRAVENOUS AS NEEDED
Status: DISCONTINUED | OUTPATIENT
Start: 2022-05-06 | End: 2022-05-06 | Stop reason: HOSPADM

## 2022-05-06 RX ORDER — GUAIFENESIN 100 MG/5ML
LIQUID (ML) ORAL AS NEEDED
Status: DISCONTINUED | OUTPATIENT
Start: 2022-05-06 | End: 2022-05-06 | Stop reason: HOSPADM

## 2022-05-06 RX ORDER — HEPARIN SODIUM 1000 [USP'U]/ML
INJECTION, SOLUTION INTRAVENOUS; SUBCUTANEOUS AS NEEDED
Status: DISCONTINUED | OUTPATIENT
Start: 2022-05-06 | End: 2022-05-06 | Stop reason: HOSPADM

## 2022-05-06 RX ORDER — NITROGLYCERIN 5 MG/ML
INJECTION, SOLUTION INTRAVENOUS AS NEEDED
Status: DISCONTINUED | OUTPATIENT
Start: 2022-05-06 | End: 2022-05-06 | Stop reason: HOSPADM

## 2022-05-06 RX ORDER — ACETAMINOPHEN 325 MG/1
650 TABLET ORAL
Status: DISCONTINUED | OUTPATIENT
Start: 2022-05-06 | End: 2022-05-06 | Stop reason: HOSPADM

## 2022-05-06 RX ADMIN — SODIUM CHLORIDE 60 ML/HR: 9 INJECTION, SOLUTION INTRAVENOUS at 06:34

## 2022-05-06 RX ADMIN — AMLODIPINE BESYLATE 2.5 MG: 5 TABLET ORAL at 09:58

## 2022-05-06 NOTE — PROGRESS NOTES
Provided patient education about and explained the importance of medication compliance. Patient stated that he can afford the antiplatelet medication prescribed. Patient was advised that if the antiplatelet medication becomes unaffordable, he must notify the cardiologist immediately so that an alternate plan for antiplatelet therapy can be made. Patient stated that he has been taking clopidogrel and has some at home so that it will be available for the next scheduled dose after discharge. Patient asked appropriate questions and verbalized understanding during this consultation and was given printed teaching materials and my contact information in case I can provide further assistance. Spoke with patient about phase 2 outpatient cardiac rehab. Patient was given a choice of facilities to be enrolled and chose [facility]. Patient was scheduled for and made aware of, verbally and in writing, an initial evaluation appointment to begin cardiac rehab on May 31 at 2:30. Patient was made aware of this verbally and in writing. Patients information was forwarded to this facility. Patient verbalized understanding and was given printed teaching materials, my contact information in case he needs further assistance, and the address and phone number for the cardiac rehab facility to which he has been referred.

## 2022-05-06 NOTE — Clinical Note
Contrast Dose Calculator:   Patient's age: 60.   Patient's sex: Male. Patient weight (kg) = 136.1. Creatinine level (mg/dL) = 0.96. Creatinine clearance (mL/min): 152. Contrast concentration (mg/mL) = 370. MACD = 300 mL. Max Contrast dose per Creatinine Cl calculator = 342 mL.

## 2022-05-06 NOTE — DISCHARGE INSTRUCTIONS
Patient Education   Patient Education        Learning About Monitored Anesthesia Care (MAC)  What is monitored anesthesia care? Monitored anesthesia care (MAC) means that an anesthesia specialist will care for you during your surgery. He or she will make sure that you get only the level of anesthesia you need to prevent pain for your specific case. MAC can be used instead of always giving general or regional anesthesia. MAC is often combined with local anesthesia for smaller procedures. That means you will be numb in the area being operated on. Sedation with MAC can make you feel sleepy and relaxed. A doctor or a nurse who is trained to give anesthesia will closely watch you during surgery. He or she may adjust the medicine if needed so that you stay safe and comfortable. You may not remember much about the surgery after. There may be fewer side effects than with other types of anesthesia. It can also help you recover more quickly. What happens before surgery? An anesthesia specialist will talk to you before the surgery. He or she might ask about your health, past surgeries, medicine you take, and your family history, and your feelings about the surgery. He or she will help you decide if MAC is right for you. You will get an IV, which lets medicine into your vein through a tube. You may get:  · A sedative or anxiety medicine to help you relax. · Pain medicine. It can prevent pain during the surgery. You might also get a shot to make the area near the surgery numb. You will get a list of things to do to help you prepare for your surgery. You will learn about when to stop eating and drinking. If you take medicine, you will learn what you can and can't take before surgery. You will be asked to sign a form that says you understand the risks of anesthesia. Before you sign it, your doctor will talk with you about sedation with MAC. He or she may talk about other types of anesthesia as well.  You will learn about the risks and benefits of each type. You may be told that the doctor may need to change from MAC to general anesthesia during surgery to keep you comfortable and safe. Many people are nervous before they have surgery. Ask your doctor about ways to relax. These may include relaxation exercises or medicine. What are the risks of anesthesia? Major side effects aren't common. But all types of anesthesia have some risk. Your risk depends on your overall health. It also depends on the type of anesthesia you have and how you respond to it. Serious but rare risks include breathing problems, heart attack, stroke, and a bad reaction to the medicine. Some health conditions increase the risk of problems. Your doctor will find out about any health problems you have that may affect your care. Your doctor or nurse will closely watch your vital signs during surgery. This includes checking your breathing, blood pressure, and heart rate. This may help you avoid problems from anesthesia. What can you expect after having MAC? · Right after the surgery, you will be in the recovery room. Nurses will make sure you are safe and comfortable. · You may feel some of the effects of sedation with MAC for several hours. ? If you had local or regional anesthesia, you may feel numb and have less feeling in part of your body. It may also take a few hours for you to be able to move and control your muscles as usual.  ? If you had local anesthesia along with MAC, you may feel some pain and discomfort as the anesthetic wears off. Tell someone if you have pain. Pain medicine works better if you take it before the pain gets bad.  ? If your sedation with MAC was switched to general anesthesia during surgery, you may be confused. Or it may be hard to think clearly. This is normal. It may take some time before the effects are completely gone. · For minor surgeries, you may go home the same day.  For other surgeries, you may stay in the hospital. Your doctor will check on your recovery from the anesthesia. Your doctor will answer any questions you may have. · Don't do anything for 24 hours that requires attention to detail. This includes going to work or school, making important decisions, and signing any legal documents. It takes time for the medicine effects to completely wear off. · For your safety, do not drive or operate any machinery that could be dangerous until the medicine wears off and you can think clearly and react easily. Follow-up care is a key part of your treatment and safety. Be sure to make and go to all appointments, and call your doctor if you are having problems. It's also a good idea to know your test results and keep a list of the medicines you take. Where can you learn more? Go to http://www.gray.com/  Enter V730 in the search box to learn more about \"Learning About Monitored Anesthesia Care (MAC). \"  Current as of: February 11, 2021               Content Version: 13.2  © 2006-2022 [employer]. Care instructions adapted under license by [employer] (which disclaims liability or warranty for this information). If you have questions about a medical condition or this instruction, always ask your healthcare professional. [place] [address] any warranty or liability for your use of this information. Coronary Angioplasty: What to Expect at [facility]     Coronary angioplasty is a procedure that is used to open a narrowed or blocked coronary artery. It may also be called a percutaneous coronary intervention (PCI). The doctor opened your narrowed or blocked artery by putting a thin tube, called a catheter, into your heart through a blood vessel. The catheter was inserted into the blood vessel in your groin or wrist. The doctor may have placed a small tube, called a stent, in the artery.   Your groin or wrist may have a bruise and feel sore for a few days after the procedure. You can do light activities around the house. But don't do anything strenuous until your doctor says it is okay. This may be for several days. This care sheet gives you a general idea about how long it will take for you to recover. But each person recovers at a different pace. Follow the steps below to get better as quickly as possible. How can you care for yourself at home? Activity    · If the doctor gave you a sedative:  ? For 24 hours, don't do anything that requires attention to detail, such as going to work, making important decisions, or signing any legal documents. It takes time for the medicine's effects to completely wear off.  ? For your safety, do not drive or operate any machinery that could be dangerous. Wait until the medicine wears off and you can think clearly and react easily.     · Do not do strenuous exercise and do not lift, pull, or push anything heavy until your doctor says it is okay. This may be for several days. You can walk around the house and do light activity, such as cooking.     · If the catheter was placed in your groin, try not to walk up stairs for the first couple of days.     · If the catheter was placed in your arm near your wrist, do not bend your wrist deeply for the first couple of days. Be careful using your hand to get into and out of a chair or bed.     · Carry your stent identification card with you at all times.     · If your doctor recommends it, get more exercise. Walking is a good choice. Bit by bit, increase the amount you walk every day. Try for at least 30 minutes on most days of the week.     · If you haven't been set up with a cardiac rehab program, talk to your doctor about whether rehab is right for you. Cardiac rehab includes supervised exercise. It also includes help with diet and lifestyle changes and emotional support. Diet    · Drink plenty of fluids to help your body flush out the dye.  If you have kidney, heart, or liver disease and have to limit fluids, talk with your doctor before you increase the amount of fluids you drink.     · Keep eating a heart-healthy diet that has lots of fruits, vegetables, and whole grains. If you have not been eating this way, talk to your doctor. You also may want to talk to a dietitian. This expert can help you to learn about healthy foods and plan meals. Medicines    · Your doctor will tell you if and when you can restart your medicines. Your doctor will also give you instructions about taking any new medicines.     · If you take aspirin or some other blood thinner, ask your doctor if and when to start taking it again. Make sure that you understand exactly what your doctor wants you to do.     · Your doctor will prescribe blood-thinning medicines. You will likely take aspirin plus another antiplatelet, such as clopidogrel (Plavix). It is very important that you take these medicines exactly as directed. These medicines help keep the coronary artery open and reduce your risk of a heart attack.     · Call your doctor if you think you are having a problem with your medicine. Care of the catheter site    · For 1 or 2 days, keep a bandage over the spot where the catheter was inserted. The bandage probably will fall off in this time.     · Put ice or a cold pack on the area for 10 to 20 minutes at a time to help with soreness or swelling. Put a thin cloth between the ice and your skin.     · You may shower 24 to 48 hours after the procedure, if your doctor okays it. Pat the incision dry.     · Do not soak the catheter site until it is healed. Don't take a bath for 1 week, or until your doctor tells you it is okay.     · Watch for bleeding from the site. A small amount of blood (up to the size of a quarter) on the bandage can be normal.     · If you are bleeding, lie down and press on the area for 15 minutes to try to make it stop. If the bleeding does not stop, call your doctor or seek immediate medical care. Follow-up care is a key part of your treatment and safety. Be sure to make and go to all appointments, and call your doctor if you are having problems. It's also a good idea to know your test results and keep a list of the medicines you take. When should you call for help? Call 911 anytime you think you may need emergency care. For example, call if:    · You passed out (lost consciousness).     · You have severe trouble breathing.     · You have sudden chest pain and shortness of breath, or you cough up blood.     · You have symptoms of a heart attack, such as:  ? Chest pain or pressure. ? Sweating. ? Shortness of breath. ? Nausea or vomiting. ? Pain that spreads from the chest to the neck, jaw, or one or both shoulders or arms. ? Dizziness or lightheadedness. ? A fast or uneven pulse. After calling 911, chew 1 adult-strength aspirin. Wait for an ambulance. Do not try to drive yourself.     · You have been diagnosed with angina, and you have angina symptoms that do not go away with rest or are not getting better within 5 minutes after you take one dose of nitroglycerin. Call your doctor now or seek immediate medical care if:    · You are bleeding from the area where the catheter was put in your artery.     · You have a fast-growing, painful lump at the catheter site.     · You have signs of infection, such as:  ? Increased pain, swelling, warmth, or redness. ? Red streaks leading from the catheter site. ? Pus draining from the catheter site. ? A fever.     · Your leg or hand is painful, looks blue, or feels cold, numb, or tingly. Watch closely for changes in your health, and be sure to contact your doctor if you have any problems. Where can you learn more? Go to http://www.gray.com/  Enter T265 in the search box to learn more about \"Coronary Angioplasty: What to Expect at Home. \"  Current as of: January 10, 2022               Content Version: 13.2  © 3571-8092 [employer]. Care instructions adapted under license by [employer] (which disclaims liability or warranty for this information). If you have questions about a medical condition or this instruction, always ask your healthcare professional. [place] any warranty or liability for your use of this information. Cardiac Catheterization/Angiography Discharge Instructions    *Check the puncture site frequently for swelling or bleeding. If you see any bleeding, lie down and apply pressure over the area with a clean town or washcloth. Notify your doctor for any redness, swelling, drainage or oozing from the puncture site. Notify your doctor for any fever or chills. *If the leg or arm with the puncture becomes cold, numb or painful, call Dr [place] [facility] at  [facility]    *Activity should be limited for the next 48 hours. Climb stairs as little as possible and avoid any stooping, bending or strenuous activity for 48 hours. No heavy lifting (anything over 10 pounds) for three days. *Do not drive for 48 hours. *You may resume your usual diet. Drink more fluids than usual.    *Have a responsible person drive you home and stay with you for at least 24 hours after your heart catheterization/angiography. *You may remove the bandage from your {ARM/GROIN:92964} in 24 hours. You may shower in 24 hours. No tub baths, hot tubs or swimming for one week. Do not place any lotions, creams, powders, ointments over the puncture site for one week. You may place a clean band-aid over the puncture site each day for 5 days. Change this daily.

## 2022-05-06 NOTE — Clinical Note
History and physical documented and up to date, allergies reviewed, lab results reviewed, pre-procedure education provided, patient verbalized understanding of procedure, procedural consent signed and patient is NPO. Spoke to pt's mother. She stts pt is scheduled for surgery 7/24. Explained she may have nurse @ [address] contact us after Felyklever Leung has a lab draw, so we can fax results once avail & lab techs obtain specimens and do forward results.  Notified her that scr

## 2022-05-25 ENCOUNTER — OFFICE VISIT (OUTPATIENT)
Dept: FAMILY MEDICINE CLINIC | Age: 62
End: 2022-05-25
Payer: COMMERCIAL

## 2022-05-25 VITALS
WEIGHT: 300.8 LBS | TEMPERATURE: 98.4 F | RESPIRATION RATE: 18 BRPM | HEIGHT: 73 IN | HEART RATE: 89 BPM | OXYGEN SATURATION: 98 % | BODY MASS INDEX: 39.87 KG/M2 | DIASTOLIC BLOOD PRESSURE: 88 MMHG | SYSTOLIC BLOOD PRESSURE: 138 MMHG

## 2022-05-25 DIAGNOSIS — K58.1 IRRITABLE BOWEL SYNDROME WITH CONSTIPATION: ICD-10-CM

## 2022-05-25 DIAGNOSIS — I11.9 HYPERTENSIVE HEART DISEASE WITHOUT HEART FAILURE: ICD-10-CM

## 2022-05-25 DIAGNOSIS — I25.10 CORONARY ARTERIOSCLEROSIS IN NATIVE ARTERY: ICD-10-CM

## 2022-05-25 DIAGNOSIS — E66.01 CLASS 2 SEVERE OBESITY DUE TO EXCESS CALORIES WITH SERIOUS COMORBIDITY AND BODY MASS INDEX (BMI) OF 39.0 TO 39.9 IN ADULT (HCC): ICD-10-CM

## 2022-05-25 DIAGNOSIS — E55.9 VITAMIN D DEFICIENCY: Primary | ICD-10-CM

## 2022-05-25 DIAGNOSIS — K21.00 GASTROESOPHAGEAL REFLUX DISEASE WITH ESOPHAGITIS WITHOUT HEMORRHAGE: ICD-10-CM

## 2022-05-25 DIAGNOSIS — E78.00 HYPERCHOLESTEROLEMIA: ICD-10-CM

## 2022-05-25 PROCEDURE — 99214 OFFICE O/P EST MOD 30 MIN: CPT | Performed by: FAMILY MEDICINE

## 2022-05-25 NOTE — PROGRESS NOTES
Judith Lara is a 60 y.o. male and presents with New Patient (former patient of dr Zane Navarro), Hypertension, and GERD  . HPI   59 yo AAM with a hx of HTN and s/p cardiac stenting c/o constipation only relieved by Amitiza as needed(pt corrected and instructed to use daily)  Subjective:  Cardiovascular Review:  The patient has hypertension   Diet and Lifestyle: generally follows a low fat low cholesterol diet, generally follows a low sodium diet, exercises sporadically  Home BP Monitoring: is not measured at home. Pertinent ROS: taking medications as instructed, no medication side effects noted, no TIA's, no chest pain on exertion, no dyspnea on exertion, no swelling of ankles. Review of Systems  Review of Systems   Constitutional: Negative. Negative for chills and fever. HENT: Negative. Negative for congestion, ear discharge, hearing loss, nosebleeds and tinnitus. Eyes: Negative. Negative for blurred vision, double vision, photophobia and pain. Respiratory: Negative. Negative for cough, hemoptysis and sputum production. Cardiovascular: Negative. Negative for chest pain and palpitations. Gastrointestinal: Positive for constipation. Negative for heartburn, nausea and vomiting. Genitourinary: Negative. Negative for dysuria, frequency and urgency. Musculoskeletal: Negative. Negative for back pain and myalgias. Skin: Negative. Neurological: Negative. Negative for dizziness, tingling, weakness and headaches. Endo/Heme/Allergies: Negative. Psychiatric/Behavioral: Negative. Negative for depression and suicidal ideas. The patient does not have insomnia. All other systems reviewed and are negative.         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    Obesity 2020    Tinea barbae 2020    Vitamin D deficiency 2020     Past Surgical History:   Procedure Laterality Date    HX COLONOSCOPY      HX HEART CATHETERIZATION      cardiac stent placement    HX HEART CATHETERIZATION      HX HERNIA REPAIR      HX ORTHOPAEDIC Right     procedure on knee     Social History     Socioeconomic History    Marital status:    Tobacco Use    Smoking status: Former Smoker     Packs/day: 1.00     Years: 40.00     Pack years: 40.00     Quit date:      Years since quittin.4    Smokeless tobacco: Never Used   Vaping Use    Vaping Use: Never used   Substance and Sexual Activity    Alcohol use: Yes     Comment: occassionally    Drug use: Never     Social Determinants of Health     Food Insecurity: No Food Insecurity    Worried About Running Out of Food in the Last Year: Never true    Ran Out of Food in the Last Year: Never true   Physical Activity: Insufficiently Active    Days of Exercise per Week: 2 days    Minutes of Exercise per Session: 20 min   Social Connections:  Moderately Integrated    Frequency of Communication with Friends and Family: Twice a week    Frequency of Social Gatherings with Friends and Family: Twice a week    Attends Scientologist Services: 1 to 4 times per year    Active Member of [address] [place] [address] or Organizations: No    Attends Club or Organization Meetings: Never    Marital Status:    Housing Stability: [address] Unable to Pay for Housing in the Last Year: No    Number of [place] in the Last Year: 1    Unstable Housing in the Last Year: No     Family History   Problem Relation Age of Onset    Hypertension Mother     Diabetes Mother     High Cholesterol Mother     Hypertension Father     High Cholesterol Father      Current Outpatient Medications   Medication Sig Dispense Refill    folic acid (FOLVITE) 1 mg tablet Take 1 mg by mouth daily.  carvediloL (COREG) 25 mg tablet Take 25 mg by mouth two (2) times a day.  aspirin delayed-release 81 mg tablet Take 1 Tablet by mouth daily.  cyanocobalamin (Vitamin B-12) 1,000 mcg tablet Take 1,000 mcg by mouth daily.  hydrOXYzine HCL (ATARAX) 10 mg tablet Take 10 mg by mouth three (3) times daily as needed for Itching.  hydrOXYzine HCL (ATARAX) 25 mg tablet Take 25 mg by mouth three (3) times daily as needed for Itching.  chlorthalidone (HYGROTON) 25 mg tablet Take 1 Tablet by mouth daily. 60 Tablet 0    linaCLOtide (Linzess) 290 mcg cap capsule TAKE 1 CAPSULE BY MOUTH EVERY DAY AS NEEDED 30 Capsule 2    potassium chloride SA (MICRO-K) 10 mEq capsule Take 1 Capsule by mouth daily for 180 days. 90 Capsule 1    pantoprazole (PROTONIX) 40 mg tablet TAKE 1 TABLET BY MOUTH EVERY DAY 90 Tablet 1    clopidogreL (PLAVIX) 75 mg tab Take 1 Tablet by mouth daily for 180 days. 90 Tablet 1    lubiPROStone (AMITIZA) 24 mcg capsule TAKE 1 CAPSULE BY MOUTH TWICE DAILY WITH MEALS 180 Capsule 1    atorvastatin (LIPITOR) 80 mg tablet TAKE 1 TABLET BY MOUTH EVERY DAY 90 Tablet 1     No Known Allergies    Objective:  Visit Vitals  /88 (BP 1 Location: Right arm, BP Patient Position: Sitting, BP Cuff Size: Adult)   Pulse 89   Temp 98.4 °F (36.9 °C) (Oral)   Resp 18   Ht 6' 1\" (1.854 m)   Wt 300 lb 12.8 oz (136.4 kg)   SpO2 98%   BMI 39.69 kg/m²       Physical Exam:   Physical Exam  Vitals and nursing note reviewed. Constitutional:       General: He is not in acute distress. Appearance: Normal appearance. He is obese. He is not ill-appearing, toxic-appearing or diaphoretic. HENT:      Head: Normocephalic and atraumatic.       Right Ear: Tympanic membrane, ear canal and external ear normal. There is no impacted cerumen. Left Ear: Tympanic membrane, ear canal and external ear normal. There is no impacted cerumen. Nose: Nose normal. No congestion or rhinorrhea. Mouth/Throat:      Mouth: Mucous membranes are moist.      Pharynx: Oropharynx is clear. No oropharyngeal exudate or posterior oropharyngeal erythema. Eyes:      General: No scleral icterus. Right eye: No discharge. Left eye: No discharge. Extraocular Movements: Extraocular movements intact. Conjunctiva/sclera: Conjunctivae normal.      Pupils: Pupils are equal, round, and reactive to light. Neck:      Vascular: No carotid bruit. Cardiovascular:      Rate and Rhythm: Normal rate and regular rhythm. Pulses: Normal pulses. Heart sounds: Normal heart sounds. No murmur heard. No friction rub. No gallop. Pulmonary:      Effort: Pulmonary effort is normal. No respiratory distress. Breath sounds: Normal breath sounds. No stridor. No wheezing, rhonchi or rales. Chest:      Chest wall: No tenderness. Abdominal:      General: Abdomen is flat. Bowel sounds are normal. There is no distension. Palpations: Abdomen is soft. There is no mass. Tenderness: There is no abdominal tenderness. There is no right CVA tenderness, left CVA tenderness, guarding or rebound. Hernia: No hernia is present. Musculoskeletal:         General: No swelling, tenderness, deformity or signs of injury. Normal range of motion. Cervical back: Normal range of motion and neck supple. No rigidity. No muscular tenderness. Right lower leg: No edema. Left lower leg: No edema. Lymphadenopathy:      Cervical: No cervical adenopathy. Skin:     General: Skin is warm. Capillary Refill: Capillary refill takes 2 to 3 seconds. Coloration: Skin is not jaundiced or pale. Findings: No bruising, erythema, lesion or rash.    Neurological:      General: No focal deficit present. Mental Status: He is alert and oriented to person, place, and time. Mental status is at baseline. Cranial Nerves: No cranial nerve deficit. Sensory: No sensory deficit. Motor: No weakness. Coordination: Coordination normal.      Gait: Gait normal.      Deep Tendon Reflexes: Reflexes normal.   Psychiatric:         Mood and Affect: Mood normal.         Behavior: Behavior normal.         Thought Content: Thought content normal.         Judgment: Judgment normal.             Results for orders placed or performed during the hospital encounter of 05/06/22   GLUCOSE, POC   Result Value Ref Range    Glucose (POC) 133 (H) 65 - 117 mg/dL    Performed by [facility]    POC ACTIVATED CLOTTING TIME   Result Value Ref Range    Activated Clotting Time (POC) 233 (H) 74 - 125 sec    Performed by Mirella [facility]    EKG, 12 LEAD, INITIAL   Result Value Ref Range    Ventricular Rate 70 BPM    Atrial Rate 70 BPM    P-R Interval 170 ms    QRS Duration 108 ms    Q-T Interval 416 ms    QTC Calculation (Bezet) 449 ms    Calculated P Axis 66 degrees    Calculated R Axis -23 degrees    Calculated T Axis -13 degrees    Diagnosis       Normal sinus rhythm  Normal ECG    Confirmed by Tamara Bartholmoew MD, María Richardson ([id number]) on 5/6/2022 10:34:06 AM         Assessment/Plan:    ICD-10-CM ICD-9-CM    1. Vitamin D deficiency  E55.9 268.9    2. Gastroesophageal reflux disease with esophagitis without hemorrhage  K21.00 530.81      530.10    3. Coronary arteriosclerosis in native artery  I25.10 414.01    4. Hypertensive heart disease without heart failure  I11.9 402.90    5. Class 2 severe obesity due to excess calories with serious comorbidity and body mass index (BMI) of 39.0 to 39.9 in adult ([facility])  E66.01 278.01     Z68.39 V85.39    6. Irritable bowel syndrome with constipation  K58.1 564.1    7. Hypercholesterolemia  E78.00 272.0      No orders of the defined types were placed in this encounter.     Cannot display discharge medications since this is not an admission.

## 2022-05-25 NOTE — PATIENT INSTRUCTIONS
Low Sodium Diet (2,000 Milligram): Care Instructions  Overview     Limiting sodium can be an important part of managing some health problems. The most common source of sodium is salt. People get most of the salt in their diet from canned, prepared, and packaged foods. Fast food and restaurant meals also are very high in sodium. Your doctor will probably limit your sodium to less than 2,000 milligrams (mg) a day. This limit counts all the sodium in prepared and packaged foods and any salt you add to your food. Follow-up care is a key part of your treatment and safety. Be sure to make and go to all appointments, and call your doctor if you are having problems. It's also a good idea to know your test results and keep a list of the medicines you take. How can you care for yourself at home? Read food labels  · Read labels on cans and food packages. The labels tell you how much sodium is in each serving. Make sure that you look at the serving size. If you eat more than the serving size, you have eaten more sodium. · Food labels also tell you the Percent Daily Value for sodium. Choose products with low Percent Daily Values for sodium. · Be aware that sodium can come in forms other than salt, including monosodium glutamate (MSG), sodium citrate, and sodium bicarbonate (baking soda). MSG is often added to Asian food. When you eat out, you can sometimes ask for food without MSG or added salt. Buy low-sodium foods  · Buy foods that are labeled \"unsalted\" (no salt added), \"sodium-free\" (less than 5 mg of sodium per serving), or \"low-sodium\" (140 mg or less of sodium per serving). Foods labeled \"reduced-sodium\" and \"light sodium\" may still have too much sodium. Be sure to read the label to see how much sodium you are getting. · Buy fresh vegetables, or frozen vegetables without added sauces. Buy low-sodium versions of canned vegetables, soups, and other canned goods.   Prepare low-sodium meals  · Cut back on the amount of salt you use in cooking. This will help you adjust to the taste. Do not add salt after cooking. One teaspoon of salt has about 2,300 mg of sodium. · Take the salt shaker off the table. · Flavor your food with garlic, lemon juice, onion, vinegar, herbs, and spices. Do not use soy sauce, lite soy sauce, steak sauce, onion salt, garlic salt, celery salt, or ketchup on your food. · Use low-sodium salad dressings, sauces, and ketchup. Or make your own salad dressings and sauces without adding salt. · Use less salt (or none) when recipes call for it. You can often use half the salt a recipe calls for without losing flavor. Other foods such as rice, pasta, and grains do not need added salt. · Rinse canned vegetables, and cook them in fresh water. This removes some--but not all--of the salt. · Avoid water that is naturally high in sodium or that has been treated with water softeners, which add sodium. If you buy bottled water, read the label and choose a sodium-free brand. Avoid high-sodium foods  · Avoid eating:  ? Smoked, cured, salted, and canned meat, fish, and poultry. ? Ham, gomez, hot dogs, and luncheon meats. ? Regular, hard, and processed cheese and regular peanut butter. ? Crackers with salted tops, and other salted snack foods such as pretzels, chips, and salted popcorn. ? Frozen prepared meals, unless labeled low-sodium. ? Canned and dried soups, broths, and bouillon, unless labeled sodium-free or low-sodium. ? Canned vegetables, unless labeled sodium-free or low-sodium. ? Western Cate fries, pizza, tacos, and other fast foods. ? Pickles, olives, ketchup, and other condiments, especially soy sauce, unless labeled sodium-free or low-sodium. Where can you learn more? Go to http://www.gray.com/  Enter V843 in the search box to learn more about \"Low Sodium Diet (2,000 Milligram): Care Instructions. \"  Current as of: September 8, 2021               Content Version: 13.2  © 2006-2022 [employer]. Care instructions adapted under license by [employer] (which disclaims liability or warranty for this information). If you have questions about a medical condition or this instruction, always ask your healthcare professional. [address] any warranty or liability for your use of this information. Learning About Low-Fat Eating  What is low-fat eating? Most food has some fat in it. Your body needs some fat to be healthy. But some kinds of fats are healthier than others. In a low-fat eating plan, you try to choose healthier fats and eat fewer unhealthy fats. Healthy fats include olive and canola oil. Try to avoid eating too much saturated fat, such as in cheese and meats. You do not need to cut all fat from your diet. But you can make healthier choices about the types and amount of fat you eat. Even though it is a good idea to choose healthier fats, it is still important to be careful of how much fat you eat, because all fats are high in calories. What are the different types of fats? Unhealthy fat  · Saturated fat. Saturated fats are mostly in animal foods, such as meat and dairy foods. Tropical oils, such as coconut oil, palm oil, and cocoa butter, are also saturated fats. Healthy fats  · Monounsaturated fat. Monounsaturated fats are liquid at room temperature but get solid when refrigerated. Eating foods that are high in this fat may help lower your \"bad\" (LDL) cholesterol, keep your \"good\" (HDL) cholesterol level up, and lower your chances of getting coronary artery disease. This fat is found in canola oil, olive oil, peanut oil, olives, avocados, nuts, and nut butters. · Polyunsaturated fat. Polyunsaturated fats are liquid at room temperature. They are in safflower, sunflower, and corn oils. They are also the main fat in seafood. Omega-3 fatty acids are types of polyunsaturated fat.  Eating fish may lower your chances of getting coronary artery disease. Fatty fish such as salmon and mackerel contain these healthy fatty acids. So do ground flaxseeds and flaxseed oil, soybeans, walnuts, and seeds. Why cut down on unhealthy fats? Eating foods that contain saturated fats can raise the LDL (\"bad\") cholesterol in your blood. Having a high level of LDL cholesterol increases your chance of hardening of the arteries (atherosclerosis), which can lead to heart disease, heart attack, and stroke. In general:  · No more than 10% of your daily calories should come from saturated fat. This is about 20 grams in a 2,000-calorie diet. · No more than 10% of your daily calories should come from polyunsaturated fat. This is about 20 grams in a 2,000-calorie diet. · Monounsaturated fats can be up to 15% of your daily calories. This is about 25 to 30 grams in a 2,000-calorie diet. If you're not sure how much fat you should be eating or how many calories you need each day to stay at a healthy weight, talk to a registered dietitian. A dietitian can help you create a plan that's right for you. What can you do to cut down on fat? Foods like cheese, butter, sausage, and desserts can have a lot of unhealthy fats. Try these tips for healthier meals at home and when you eat out. At home  · Fill up on fruits, vegetables, and whole grains. · Think of meat as a side dish instead of as the main part of your meal.  · When you do eat meat, make it extra-lean ground beef (97% lean), ground turkey breast (without skin added), meats with fat trimmed off before cooking, or skinless chicken. · Try main dishes that use whole wheat pasta, brown rice, dried beans, or vegetables. · Use cooking methods that use little or no fat, such as broiling, steaming, or grilling. Use cooking spray instead of oil. If you use oil, use a monounsaturated oil, such as canola or olive oil. · Read food labels on canned, bottled, or packaged foods.  Choose those with little saturated fat.  When eating out at a restaurant  · Order foods that are broiled or poached instead of fried or breaded. · Cut back on the amount of butter or margarine that you use on bread. Use small amounts of olive oil instead. · Order sauces, gravies, and salad dressings on the side, and use only a little. · When you order pasta, choose tomato sauce instead of cream sauce. · Ask for salsa with your baked potato instead of sour cream, butter, cheese, or gomez. Where can you learn more? Go to http://www.gray.com/  Enter [id number] in the search box to learn more about \"Learning About Low-Fat Eating. \"  Current as of: September 8, 2021               Content Version: 13.2  © 2006-2022 [employer]. Care instructions adapted under license by [employer] (which disclaims liability or warranty for this information). If you have questions about a medical condition or this instruction, always ask your healthcare professional. [place] [address] any warranty or liability for your use of this information.

## 2022-05-25 NOTE — PROGRESS NOTES
1. \"Have you been to the ER, urgent care clinic since your last visit? Hospitalized since your last visit? \" Yes When: May 2022 Where: [facility] location Reason for visit: heart stent    2. \"Have you seen or consulted any other health care providers outside of the [address] [place] [address] since your last visit? \" No     3. For patients aged 39-70: Has the patient had a colonoscopy / FIT/ Cologuard? No      If the patient is female:    4. For patients aged 41-77: Has the patient had a mammogram within the past 2 years? NA - based on age or sex      11. For patients aged 21-65: Has the patient had a pap smear?  NA - based on age or sex     Chief Complaint   Patient presents with    New Patient     former patient of dr Teofilo Rizo Hypertension    GERD       Visit Vitals  /88 (BP 1 Location: Right arm, BP Patient Position: Sitting, BP Cuff Size: Adult)   Pulse 89   Temp 98.4 °F (36.9 °C) (Oral)   Resp 18   Ht 6' 1\" (1.854 m)   Wt 300 lb 12.8 oz (136.4 kg)   SpO2 98%   BMI 39.69 kg/m²       Patient is here as a new patient for Dr. Marcello Bertrand he is a former patient of dr. Alexei Cardozo,  of hypertension, Adeline Tenorio, patient was in the hospital May 2022 to have heart stents placed

## 2022-06-02 ENCOUNTER — TELEPHONE (OUTPATIENT)
Dept: INTERNAL MEDICINE CLINIC | Age: 62
End: 2022-06-02

## 2022-06-02 NOTE — TELEPHONE ENCOUNTER
[facility] faxed refill request for the following medication:      Atorvastatin 80 MG Tablet  QTY: 90  Refills: 1  Take 1 tablet by mouth every day          LOV 5- NOV 9-7-2022

## 2022-06-03 ENCOUNTER — HOSPITAL ENCOUNTER (OUTPATIENT)
Age: 62
Setting detail: OUTPATIENT SURGERY
Discharge: HOME | End: 2022-06-03
Attending: INTERNAL MEDICINE | Admitting: INTERNAL MEDICINE
Payer: COMMERCIAL

## 2022-06-03 VITALS
WEIGHT: 300 LBS | HEIGHT: 72 IN | BODY MASS INDEX: 40.63 KG/M2 | DIASTOLIC BLOOD PRESSURE: 83 MMHG | OXYGEN SATURATION: 98 % | RESPIRATION RATE: 18 BRPM | SYSTOLIC BLOOD PRESSURE: 140 MMHG | HEART RATE: 62 BPM | TEMPERATURE: 97.6 F

## 2022-06-03 DIAGNOSIS — R07.89 OTHER CHEST PAIN: ICD-10-CM

## 2022-06-03 LAB
ATRIAL RATE: 52 BPM
CALCULATED P AXIS, ECG09: 52 DEGREES
CALCULATED R AXIS, ECG10: -23 DEGREES
CALCULATED T AXIS, ECG11: -14 DEGREES
DIAGNOSIS, 93000: NORMAL
P-R INTERVAL, ECG05: 178 MS
Q-T INTERVAL, ECG07: 450 MS
QRS DURATION, ECG06: 102 MS
QTC CALCULATION (BEZET), ECG08: 418 MS
VENTRICULAR RATE, ECG03: 52 BPM

## 2022-06-03 PROCEDURE — C1760 CLOSURE DEV, VASC: HCPCS | Performed by: INTERNAL MEDICINE

## 2022-06-03 PROCEDURE — 93571 IV DOP VEL&/PRESS C FLO 1ST: CPT | Performed by: INTERNAL MEDICINE

## 2022-06-03 PROCEDURE — 2709999900 HC NON-CHARGEABLE SUPPLY: Performed by: INTERNAL MEDICINE

## 2022-06-03 PROCEDURE — 77030029997 HC DEV COM RDL R BND TELE -B: Performed by: INTERNAL MEDICINE

## 2022-06-03 PROCEDURE — 74011250636 HC RX REV CODE- 250/636: Performed by: INTERNAL MEDICINE

## 2022-06-03 PROCEDURE — C1894 INTRO/SHEATH, NON-LASER: HCPCS | Performed by: INTERNAL MEDICINE

## 2022-06-03 PROCEDURE — 77030040934 HC CATH DIAG DXTERITY MEDT -A: Performed by: INTERNAL MEDICINE

## 2022-06-03 PROCEDURE — 36415 COLL VENOUS BLD VENIPUNCTURE: CPT

## 2022-06-03 PROCEDURE — 76210000023 HC REC RM PH II 2 TO 2.5 HR: Performed by: INTERNAL MEDICINE

## 2022-06-03 PROCEDURE — 77030019698 HC SYR ANGI MDLON MRTM -A: Performed by: INTERNAL MEDICINE

## 2022-06-03 PROCEDURE — 74011000636 HC RX REV CODE- 636: Performed by: INTERNAL MEDICINE

## 2022-06-03 PROCEDURE — C1887 CATHETER, GUIDING: HCPCS | Performed by: INTERNAL MEDICINE

## 2022-06-03 PROCEDURE — 77030008817: Performed by: INTERNAL MEDICINE

## 2022-06-03 PROCEDURE — C1769 GUIDE WIRE: HCPCS | Performed by: INTERNAL MEDICINE

## 2022-06-03 PROCEDURE — 99153 MOD SED SAME PHYS/QHP EA: CPT | Performed by: INTERNAL MEDICINE

## 2022-06-03 PROCEDURE — 77030008542 HC TBNG MON PRSS EDWD -A: Performed by: INTERNAL MEDICINE

## 2022-06-03 PROCEDURE — 77030029065 HC DRSG HEMO QCLOT ZMED -B: Performed by: INTERNAL MEDICINE

## 2022-06-03 PROCEDURE — 76210000000 HC OR PH I REC 2 TO 2.5 HR: Performed by: INTERNAL MEDICINE

## 2022-06-03 PROCEDURE — 77030016699 HC CATH ANGI DX INFN1 CARD -A: Performed by: INTERNAL MEDICINE

## 2022-06-03 PROCEDURE — 77030008814 HC VLV ACC PLUS MRTM -B: Performed by: INTERNAL MEDICINE

## 2022-06-03 PROCEDURE — 74011000250 HC RX REV CODE- 250: Performed by: INTERNAL MEDICINE

## 2022-06-03 PROCEDURE — 93005 ELECTROCARDIOGRAM TRACING: CPT

## 2022-06-03 PROCEDURE — 74011250637 HC RX REV CODE- 250/637: Performed by: INTERNAL MEDICINE

## 2022-06-03 PROCEDURE — 93458 L HRT ARTERY/VENTRICLE ANGIO: CPT | Performed by: INTERNAL MEDICINE

## 2022-06-03 PROCEDURE — 99152 MOD SED SAME PHYS/QHP 5/>YRS: CPT | Performed by: INTERNAL MEDICINE

## 2022-06-03 RX ORDER — GUAIFENESIN 100 MG/5ML
LIQUID (ML) ORAL AS NEEDED
Status: DISCONTINUED | OUTPATIENT
Start: 2022-06-03 | End: 2022-06-03 | Stop reason: HOSPADM

## 2022-06-03 RX ORDER — VERAPAMIL HYDROCHLORIDE 2.5 MG/ML
INJECTION, SOLUTION INTRAVENOUS AS NEEDED
Status: DISCONTINUED | OUTPATIENT
Start: 2022-06-03 | End: 2022-06-03 | Stop reason: HOSPADM

## 2022-06-03 RX ORDER — NALOXONE HYDROCHLORIDE 0.4 MG/ML
0.4 INJECTION, SOLUTION INTRAMUSCULAR; INTRAVENOUS; SUBCUTANEOUS AS NEEDED
Status: DISCONTINUED | OUTPATIENT
Start: 2022-06-03 | End: 2022-06-03 | Stop reason: HOSPADM

## 2022-06-03 RX ORDER — ACETAMINOPHEN 325 MG/1
650 TABLET ORAL
Status: DISCONTINUED | OUTPATIENT
Start: 2022-06-03 | End: 2022-06-03 | Stop reason: HOSPADM

## 2022-06-03 RX ORDER — NITROGLYCERIN 5 MG/ML
INJECTION, SOLUTION INTRAVENOUS AS NEEDED
Status: DISCONTINUED | OUTPATIENT
Start: 2022-06-03 | End: 2022-06-03 | Stop reason: HOSPADM

## 2022-06-03 RX ORDER — ONDANSETRON 2 MG/ML
4 INJECTION INTRAMUSCULAR; INTRAVENOUS
Status: DISCONTINUED | OUTPATIENT
Start: 2022-06-03 | End: 2022-06-03 | Stop reason: HOSPADM

## 2022-06-03 RX ORDER — SODIUM CHLORIDE 9 MG/ML
60 INJECTION, SOLUTION INTRAVENOUS CONTINUOUS
Status: DISCONTINUED | OUTPATIENT
Start: 2022-06-03 | End: 2022-06-03 | Stop reason: HOSPADM

## 2022-06-03 RX ORDER — HEPARIN SODIUM 1000 [USP'U]/ML
INJECTION, SOLUTION INTRAVENOUS; SUBCUTANEOUS AS NEEDED
Status: DISCONTINUED | OUTPATIENT
Start: 2022-06-03 | End: 2022-06-03 | Stop reason: HOSPADM

## 2022-06-03 RX ORDER — HEPARIN SODIUM 200 [USP'U]/100ML
INJECTION, SOLUTION INTRAVENOUS
Status: COMPLETED | OUTPATIENT
Start: 2022-06-03 | End: 2022-06-03

## 2022-06-03 RX ORDER — MIDAZOLAM HYDROCHLORIDE 1 MG/ML
INJECTION INTRAMUSCULAR; INTRAVENOUS AS NEEDED
Status: DISCONTINUED | OUTPATIENT
Start: 2022-06-03 | End: 2022-06-03 | Stop reason: HOSPADM

## 2022-06-03 RX ORDER — LIDOCAINE HYDROCHLORIDE 10 MG/ML
INJECTION INFILTRATION; PERINEURAL AS NEEDED
Status: DISCONTINUED | OUTPATIENT
Start: 2022-06-03 | End: 2022-06-03 | Stop reason: HOSPADM

## 2022-06-03 RX ORDER — FENTANYL CITRATE 50 UG/ML
INJECTION, SOLUTION INTRAMUSCULAR; INTRAVENOUS AS NEEDED
Status: DISCONTINUED | OUTPATIENT
Start: 2022-06-03 | End: 2022-06-03 | Stop reason: HOSPADM

## 2022-06-03 RX ORDER — SODIUM CHLORIDE 9 MG/ML
100 INJECTION, SOLUTION INTRAVENOUS CONTINUOUS
Status: DISCONTINUED | OUTPATIENT
Start: 2022-06-03 | End: 2022-06-03 | Stop reason: HOSPADM

## 2022-06-03 RX ORDER — OXYCODONE AND ACETAMINOPHEN 5; 325 MG/1; MG/1
1 TABLET ORAL
Status: DISCONTINUED | OUTPATIENT
Start: 2022-06-03 | End: 2022-06-03 | Stop reason: HOSPADM

## 2022-06-03 RX ORDER — GUAIFENESIN 100 MG/5ML
81 LIQUID (ML) ORAL DAILY
Status: DISCONTINUED | OUTPATIENT
Start: 2022-06-04 | End: 2022-06-03 | Stop reason: HOSPADM

## 2022-06-03 RX ORDER — CLOPIDOGREL BISULFATE 75 MG/1
75 TABLET ORAL DAILY
Status: DISCONTINUED | OUTPATIENT
Start: 2022-06-04 | End: 2022-06-03 | Stop reason: HOSPADM

## 2022-06-03 RX ORDER — CLOPIDOGREL BISULFATE 75 MG/1
TABLET ORAL AS NEEDED
Status: DISCONTINUED | OUTPATIENT
Start: 2022-06-03 | End: 2022-06-03 | Stop reason: HOSPADM

## 2022-06-03 RX ADMIN — SODIUM CHLORIDE 60 ML/HR: 9 INJECTION, SOLUTION INTRAVENOUS at 10:54

## 2022-06-03 NOTE — Clinical Note
Contrast Dose Calculator:   Patient's age: 60.   Patient's sex: Male. Patient weight (kg) = 182.9. Creatinine level (mg/dL) = 0.96. Creatinine clearance (mL/min): 204. Contrast concentration (mg/mL) = 370. MACD = 300 mL. Max Contrast dose per Creatinine Cl calculator = 459 mL.

## 2022-06-03 NOTE — ROUTINE PROCESS
Attempted to call spouse per patient request.  No answer at telephone number provided. 0 - family member contact number updated in chart.   Spoke with wife Jimbo Bright and updated her regarding patient status

## 2022-06-03 NOTE — Clinical Note
TRANSFER - OUT REPORT:     Verbal report given to: Vani (at bedside). Report consisted of patient's Situation, Background, Assessment and   Recommendations(SBAR). Opportunity for questions and clarification was provided. Patient transported with a Registered Nurse. Patient transported to: recovery.

## 2022-06-03 NOTE — PROGRESS NOTES
PT AMBULATED IN HALLWAY , TOLERATED WELL , DRESSING LEFT WRIST DRY AND INTACT , DRESSING RIGHT GROIN DRY AND INTACT IV DC'D SITE INTACT NO SWELLING NOTED DISCHARGE INSTRUCTIONS GIVEN PT VERBALIZED UNDERSTANDING NO DISTRESS NOTED

## 2022-06-06 RX ORDER — ATORVASTATIN CALCIUM 80 MG/1
80 TABLET, FILM COATED ORAL DAILY
Qty: 90 TABLET | Refills: 0 | Status: SHIPPED | OUTPATIENT
Start: 2022-06-06 | End: 2022-09-04

## 2022-06-06 RX ORDER — CHLORTHALIDONE 25 MG/1
25 TABLET ORAL DAILY
Qty: 60 TABLET | Refills: 0 | Status: SHIPPED | OUTPATIENT
Start: 2022-06-06 | End: 2022-08-17

## 2022-08-17 ENCOUNTER — TELEPHONE (OUTPATIENT)
Dept: INTERNAL MEDICINE CLINIC | Age: 62
End: 2022-08-17

## 2022-08-17 RX ORDER — CHLORTHALIDONE 25 MG/1
TABLET ORAL
Qty: 30 TABLET | Refills: 1 | Status: SHIPPED | OUTPATIENT
Start: 2022-08-17 | End: 2022-09-11

## 2022-08-22 ENCOUNTER — TELEPHONE (OUTPATIENT)
Dept: INTERNAL MEDICINE CLINIC | Age: 62
End: 2022-08-22

## 2022-08-22 DIAGNOSIS — E87.6 HYPOKALEMIA: ICD-10-CM

## 2022-08-22 NOTE — TELEPHONE ENCOUNTER
[facility] faxed refill request for the following medication:      Potassium CL ER 10 MEQ Capsule  QTY: 90  Take 1 capsule by mouth daily for 180 days          LOV 5- NOV 9-7-2022

## 2022-08-23 RX ORDER — PANTOPRAZOLE SODIUM 40 MG/1
40 TABLET, DELAYED RELEASE ORAL DAILY
Qty: 90 TABLET | Refills: 1 | Status: SHIPPED | OUTPATIENT
Start: 2022-08-23

## 2022-08-23 RX ORDER — POTASSIUM CHLORIDE 750 MG/1
10 CAPSULE, EXTENDED RELEASE ORAL DAILY
Qty: 90 CAPSULE | Refills: 1 | Status: SHIPPED | OUTPATIENT
Start: 2022-08-23 | End: 2023-02-19

## 2022-08-24 ENCOUNTER — TELEPHONE (OUTPATIENT)
Dept: FAMILY MEDICINE CLINIC | Age: 62
End: 2022-08-24

## 2022-08-24 RX ORDER — HYDROXYZINE HYDROCHLORIDE 10 MG/1
10 TABLET, FILM COATED ORAL
Qty: 45 TABLET | Refills: 0 | Status: SHIPPED | OUTPATIENT
Start: 2022-08-24 | End: 2022-09-08

## 2022-09-04 RX ORDER — ATORVASTATIN CALCIUM 80 MG/1
TABLET, FILM COATED ORAL
Qty: 90 TABLET | Refills: 0 | Status: SHIPPED | OUTPATIENT
Start: 2022-09-04

## 2022-09-07 ENCOUNTER — OFFICE VISIT (OUTPATIENT)
Dept: FAMILY MEDICINE CLINIC | Age: 62
End: 2022-09-07
Payer: COMMERCIAL

## 2022-09-07 VITALS
SYSTOLIC BLOOD PRESSURE: 139 MMHG | BODY MASS INDEX: 40.61 KG/M2 | OXYGEN SATURATION: 95 % | HEIGHT: 72 IN | TEMPERATURE: 98.8 F | WEIGHT: 299.8 LBS | RESPIRATION RATE: 16 BRPM | DIASTOLIC BLOOD PRESSURE: 81 MMHG | HEART RATE: 86 BPM

## 2022-09-07 DIAGNOSIS — E55.9 VITAMIN D DEFICIENCY: Primary | ICD-10-CM

## 2022-09-07 DIAGNOSIS — I25.10 CORONARY ARTERIOSCLEROSIS IN NATIVE ARTERY: ICD-10-CM

## 2022-09-07 DIAGNOSIS — I11.9 HYPERTENSIVE HEART DISEASE WITHOUT HEART FAILURE: ICD-10-CM

## 2022-09-07 DIAGNOSIS — K21.00 GASTROESOPHAGEAL REFLUX DISEASE WITH ESOPHAGITIS WITHOUT HEMORRHAGE: ICD-10-CM

## 2022-09-07 DIAGNOSIS — E78.2 MIXED HYPERLIPIDEMIA: ICD-10-CM

## 2022-09-07 PROCEDURE — 99214 OFFICE O/P EST MOD 30 MIN: CPT | Performed by: FAMILY MEDICINE

## 2022-09-07 NOTE — PROGRESS NOTES
Lashell Chapa is a 60 y.o. male and presents with Follow Up Chronic Condition  . HPI     61 yo AAM with a hx of HTN and obesity here on follow up stating s/p syncopal episode 3 weeks ago with work up showing complete carotid and other head stenosis-advised surgery for this will be too risky-pt to bring results in  Admits to intermittent right facial and upper and lower extremity numbness 6 times a week  Subjective:  Cardiovascular Review:  The patient has hypertension   Diet and Lifestyle: generally follows a low fat low cholesterol diet, generally follows a low sodium diet, exercises sporadically  Home BP Monitoring: is not measured at home. Pertinent ROS: taking medications as instructed, no medication side effects noted, no TIA's, no chest pain on exertion, no dyspnea on exertion, no swelling of ankles. Review of Systems  Review of Systems   Constitutional: Negative. Negative for chills and fever. HENT: Negative. Negative for congestion, ear discharge, hearing loss, nosebleeds and tinnitus. Eyes: Negative. Negative for blurred vision, double vision, photophobia and pain. Respiratory: Negative. Negative for cough, hemoptysis and sputum production. Cardiovascular: Negative. Negative for chest pain and palpitations. Gastrointestinal: Negative. Negative for heartburn, nausea and vomiting. Genitourinary: Negative. Negative for dysuria, frequency and urgency. Musculoskeletal: Negative. Negative for back pain and myalgias. Skin: Negative. Neurological:  Positive for dizziness, tingling and sensory change. Negative for weakness and headaches. Endo/Heme/Allergies: Negative. Psychiatric/Behavioral: Negative. Negative for depression and suicidal ideas. The patient does not have insomnia. All other systems reviewed and are negative.       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    Obesity 2020    Tinea barbae 2020    Vitamin D deficiency 2020     Past Surgical History:   Procedure Laterality Date    HX COLONOSCOPY      HX HEART CATHETERIZATION      cardiac stent placement    HX HEART CATHETERIZATION      HX HERNIA REPAIR      HX ORTHOPAEDIC Right     procedure on knee     Social History     Socioeconomic History    Marital status:    Tobacco Use    Smoking status: Former     Packs/day: 1.00     Years: 40.00     Pack years: 40.00     Types: Cigarettes     Quit date:      Years since quittin.6    Smokeless tobacco: Never   Vaping Use    Vaping Use: Never used   Substance and Sexual Activity    Alcohol use: Yes     Comment: occassionally    Drug use: Never     Social Determinants of Health     Financial Resource Strain: Low Risk     Difficulty of Paying Living Expenses: Not very hard   Food Insecurity: No Food Insecurity    Worried About Running Out of Food in the Last Year: Never true    Ran Out of Food in the Last Year: Never true   Physical Activity: Insufficiently Active    Days of Exercise per Week: 2 days    Minutes of Exercise per Session: 20 min   Social Connections:  Moderately Integrated    Frequency of Communication with Friends and Family: Twice a week    Frequency of Social Gatherings with Friends and Family: Twice a week    Attends [employer] Services: 1 to 4 times per year    Active Member of [address] [place] [address] [employer] or Organizations: No    Attends Club or Organization Meetings: Never    Marital Status:    Housing Stability: [address] to Pay for Housing in the Last Year: No    Number of Places Lived in the Last Year: 1    Unstable Housing in the Last Year: No     Family History   Problem Relation Age of Onset    Hypertension Mother     Diabetes Mother     High Cholesterol Mother     Hypertension Father     High Cholesterol Father      Current Outpatient Medications   Medication Sig Dispense Refill    atorvastatin (LIPITOR) 80 mg tablet TAKE 1 TABLET BY MOUTH EVERY DAY 90 Tablet 0    hydrOXYzine HCL (ATARAX) 10 mg tablet Take 1 Tablet by mouth three (3) times daily as needed for Itching for up to 15 days. 45 Tablet 0    potassium chloride SA (MICRO-K) 10 mEq capsule Take 1 Capsule by mouth daily for 180 days. 90 Capsule 1    pantoprazole (PROTONIX) 40 mg tablet Take 1 Tablet by mouth daily. 90 Tablet 1    chlorthalidone (HYGROTON) 25 mg tablet TAKE 1 TABLET BY MOUTH EVERY DAY 30 Tablet 1    folic acid (FOLVITE) 1 mg tablet Take 1 mg by mouth daily. carvediloL (COREG) 25 mg tablet Take 25 mg by mouth two (2) times a day. aspirin delayed-release 81 mg tablet Take 1 Tablet by mouth daily. cyanocobalamin 1,000 mcg tablet Take 1,000 mcg by mouth daily. linaCLOtide (Linzess) 290 mcg cap capsule TAKE 1 CAPSULE BY MOUTH EVERY DAY AS NEEDED 30 Capsule 2    lubiPROStone (AMITIZA) 24 mcg capsule TAKE 1 CAPSULE BY MOUTH TWICE DAILY WITH MEALS 180 Capsule 1    hydrOXYzine HCL (ATARAX) 25 mg tablet Take 25 mg by mouth three (3) times daily as needed for Itching. (Patient not taking: No sig reported)       No Known Allergies    Objective:  Visit Vitals  /81 (BP 1 Location: Right upper arm, BP Patient Position: Sitting, BP Cuff Size: Adult)   Pulse 86   Temp 98.8 °F (37.1 °C) (Oral)   Resp 16   Ht 6' (1.829 m)   Wt 299 lb 12.8 oz (136 kg)   SpO2 95%   BMI 40.66 kg/m²       Physical Exam:   Physical Exam  Vitals and nursing note reviewed. Constitutional:       General: He is not in acute distress. Appearance: Normal appearance. He is obese.  He is not ill-appearing, toxic-appearing or diaphoretic. HENT:      Head: Normocephalic and atraumatic. Right Ear: Tympanic membrane, ear canal and external ear normal. There is no impacted cerumen. Left Ear: Tympanic membrane, ear canal and external ear normal. There is no impacted cerumen. Nose: Nose normal. No congestion or rhinorrhea. Mouth/Throat:      Mouth: Mucous membranes are moist.      Pharynx: Oropharynx is clear. No oropharyngeal exudate or posterior oropharyngeal erythema. Eyes:      General: No scleral icterus. Right eye: No discharge. Left eye: No discharge. Extraocular Movements: Extraocular movements intact. Conjunctiva/sclera: Conjunctivae normal.      Pupils: Pupils are equal, round, and reactive to light. Neck:      Vascular: No carotid bruit. Cardiovascular:      Rate and Rhythm: Normal rate and regular rhythm. Pulses: Normal pulses. Heart sounds: Normal heart sounds. No murmur heard. No friction rub. No gallop. Pulmonary:      Effort: Pulmonary effort is normal. No respiratory distress. Breath sounds: Normal breath sounds. No stridor. No wheezing, rhonchi or rales. Chest:      Chest wall: No tenderness. Abdominal:      General: Abdomen is flat. Bowel sounds are normal. There is no distension. Palpations: Abdomen is soft. There is no mass. Tenderness: There is no abdominal tenderness. There is no right CVA tenderness, left CVA tenderness, guarding or rebound. Hernia: No hernia is present. Musculoskeletal:         General: No swelling, tenderness, deformity or signs of injury. Normal range of motion. Cervical back: Normal range of motion and neck supple. No rigidity. No muscular tenderness. Right lower leg: No edema. Left lower leg: No edema. Lymphadenopathy:      Cervical: No cervical adenopathy. Skin:     General: Skin is warm.       Capillary Refill: Capillary refill takes 2 to 3 seconds. Coloration: Skin is not jaundiced or pale. Findings: No bruising, erythema, lesion or rash. Neurological:      General: No focal deficit present. Mental Status: He is alert and oriented to person, place, and time. Mental status is at baseline. Cranial Nerves: No cranial nerve deficit. Sensory: No sensory deficit. Motor: No weakness. Coordination: Coordination normal.      Gait: Gait normal.      Deep Tendon Reflexes: Reflexes normal.   Psychiatric:         Mood and Affect: Mood normal.         Behavior: Behavior normal.         Thought Content: Thought content normal.         Judgment: Judgment normal.           Results for orders placed or performed during the hospital encounter of 06/03/22   EKG, 12 LEAD, INITIAL   Result Value Ref Range    Ventricular Rate 52 BPM    Atrial Rate 52 BPM    P-R Interval 178 ms    QRS Duration 102 ms    Q-T Interval 450 ms    QTC Calculation (Bezet) 418 ms    Calculated P Axis 52 degrees    Calculated R Axis -23 degrees    Calculated T Axis -14 degrees    Diagnosis       Sinus bradycardia  Otherwise normal ECG  When compared with ECG of 06-MAY-2022 09:42,  No significant change was found  Confirmed by NIRMALA DUARTE, Josefina Cohn ([id number]) on 6/3/2022 3:00:03 PM         Assessment/Plan:    ICD-10-CM ICD-9-CM    1. Vitamin D deficiency  E55.9 268.9       2. Hypertensive heart disease without heart failure  I11.9 402.90       3. Gastroesophageal reflux disease with esophagitis without hemorrhage  K21.00 530.81      530.10       4. Coronary arteriosclerosis in native artery  I25.10 414.01       5. Mixed hyperlipidemia  E78.2 272.2         No orders of the defined types were placed in this encounter. Cannot display discharge medications since this is not an admission.

## 2022-09-07 NOTE — PROGRESS NOTES
1. \"Have you been to the ER, urgent care clinic since your last visit? Hospitalized since your last visit? \" Yes When: 3 weeks ago  Where: [facility]  Reason for visit: passed out in yard    2. \"Have you seen or consulted any other health care providers outside of the [address] [place] [address] since your last visit? \" No     3. For patients aged 39-70: Has the patient had a colonoscopy / FIT/ Cologuard? No      If the patient is female:    4. For patients aged 41-77: Has the patient had a mammogram within the past 2 years? NA - based on age or sex      11. For patients aged 21-65: Has the patient had a pap smear?  NA - based on age or sex     Chief Complaint   Patient presents with    Follow Up Chronic Condition     Visit Vitals  /81 (BP 1 Location: Right upper arm, BP Patient Position: Sitting, BP Cuff Size: Adult)   Pulse 86   Temp 98.8 °F (37.1 °C) (Oral)   Resp 16   Ht 6' (1.829 m)   Wt 299 lb 12.8 oz (136 kg)   SpO2 95%   BMI 40.66 kg/m²     Pt is here for a follow up, says he passed out in his yard 3 weeks ago as well but went to the hospital.

## 2022-09-11 RX ORDER — CHLORTHALIDONE 25 MG/1
TABLET ORAL
Qty: 30 TABLET | Refills: 1 | Status: SHIPPED | OUTPATIENT
Start: 2022-09-11 | End: 2022-10-05

## 2022-09-20 RX ORDER — HYDROXYZINE 25 MG/1
25 TABLET, FILM COATED ORAL
Qty: 45 TABLET | Refills: 0 | Status: SHIPPED | OUTPATIENT
Start: 2022-09-20 | End: 2022-10-05 | Stop reason: SDUPTHER

## 2022-10-05 RX ORDER — CHLORTHALIDONE 25 MG/1
TABLET ORAL
Qty: 30 TABLET | Refills: 1 | Status: SHIPPED | OUTPATIENT
Start: 2022-10-05

## 2022-10-05 RX ORDER — HYDROXYZINE 25 MG/1
25 TABLET, FILM COATED ORAL
Qty: 45 TABLET | Refills: 0 | Status: SHIPPED | OUTPATIENT
Start: 2022-10-05 | End: 2022-10-20

## 2022-10-17 ENCOUNTER — DOCUMENTATION ONLY (OUTPATIENT)
Dept: FAMILY MEDICINE CLINIC | Age: 62
End: 2022-10-17

## 2022-11-11 ENCOUNTER — TRANSCRIBE ORDER (OUTPATIENT)
Dept: SCHEDULING | Age: 62
End: 2022-11-11

## 2022-11-11 DIAGNOSIS — D50.9 IRON DEFICIENCY ANEMIA, UNSPECIFIED: Primary | ICD-10-CM

## 2022-11-11 DIAGNOSIS — D64.9 ANEMIA, UNSPECIFIED: ICD-10-CM

## 2022-12-05 RX ORDER — CHLORTHALIDONE 25 MG/1
TABLET ORAL
Qty: 30 TABLET | Refills: 1 | Status: SHIPPED | OUTPATIENT
Start: 2022-12-05

## 2022-12-05 RX ORDER — ATORVASTATIN CALCIUM 80 MG/1
TABLET, FILM COATED ORAL
Qty: 90 TABLET | Refills: 0 | Status: SHIPPED | OUTPATIENT
Start: 2022-12-05

## 2023-01-05 ENCOUNTER — HOSPITAL ENCOUNTER (INPATIENT)
Age: 63
LOS: 2 days | Discharge: HOME | End: 2023-01-07
Attending: STUDENT IN AN ORGANIZED HEALTH CARE EDUCATION/TRAINING PROGRAM | Admitting: INTERNAL MEDICINE
Payer: COMMERCIAL

## 2023-01-05 ENCOUNTER — APPOINTMENT (OUTPATIENT)
Dept: CT IMAGING | Age: 63
End: 2023-01-05
Attending: INTERNAL MEDICINE
Payer: COMMERCIAL

## 2023-01-05 ENCOUNTER — APPOINTMENT (OUTPATIENT)
Dept: GENERAL RADIOLOGY | Age: 63
End: 2023-01-05
Attending: STUDENT IN AN ORGANIZED HEALTH CARE EDUCATION/TRAINING PROGRAM
Payer: COMMERCIAL

## 2023-01-05 DIAGNOSIS — R77.8 ELEVATED TROPONIN: ICD-10-CM

## 2023-01-05 DIAGNOSIS — R07.9 CHEST PAIN, UNSPECIFIED TYPE: ICD-10-CM

## 2023-01-05 DIAGNOSIS — J81.0 ACUTE PULMONARY EDEMA (HCC): Primary | ICD-10-CM

## 2023-01-05 DIAGNOSIS — I10 HYPERTENSION, UNSPECIFIED TYPE: ICD-10-CM

## 2023-01-05 PROBLEM — I50.9 ACUTE CHF (HCC): Status: ACTIVE | Noted: 2023-01-05

## 2023-01-05 LAB
ANION GAP SERPL CALC-SCNC: 9 MMOL/L (ref 5–15)
ATRIAL RATE: 102 BPM
BASOPHILS # BLD: 0 K/UL (ref 0–0.1)
BASOPHILS NFR BLD: 0 % (ref 0–1)
BNP SERPL-MCNC: 400 PG/ML
BUN SERPL-MCNC: 23 MG/DL (ref 6–20)
BUN/CREAT SERPL: 22 (ref 12–20)
CA-I BLD-MCNC: 9.6 MG/DL (ref 8.5–10.1)
CALCULATED P AXIS, ECG09: 58 DEGREES
CALCULATED R AXIS, ECG10: -34 DEGREES
CALCULATED T AXIS, ECG11: 64 DEGREES
CHLORIDE SERPL-SCNC: 104 MMOL/L (ref 97–108)
CO2 SERPL-SCNC: 27 MMOL/L (ref 21–32)
COVID-19 RAPID TEST, COVR: NOT DETECTED
CREAT SERPL-MCNC: 1.06 MG/DL (ref 0.7–1.3)
DIAGNOSIS, 93000: NORMAL
DIFFERENTIAL METHOD BLD: ABNORMAL
EOSINOPHIL # BLD: 0.2 K/UL (ref 0–0.4)
EOSINOPHIL NFR BLD: 2 % (ref 0–7)
ERYTHROCYTE [DISTWIDTH] IN BLOOD BY AUTOMATED COUNT: 22.7 % (ref 11.5–14.5)
GLUCOSE SERPL-MCNC: 225 MG/DL (ref 65–100)
HCT VFR BLD AUTO: 46.1 % (ref 36.6–50.3)
HGB BLD-MCNC: 13.8 G/DL (ref 12.1–17)
IMM GRANULOCYTES # BLD AUTO: 0 K/UL (ref 0–0.04)
IMM GRANULOCYTES NFR BLD AUTO: 0 % (ref 0–0.5)
LYMPHOCYTES # BLD: 1.8 K/UL (ref 0.8–3.5)
LYMPHOCYTES NFR BLD: 15 % (ref 12–49)
MAGNESIUM SERPL-MCNC: 2.4 MG/DL (ref 1.6–2.4)
MCH RBC QN AUTO: 25.7 PG (ref 26–34)
MCHC RBC AUTO-ENTMCNC: 29.9 G/DL (ref 30–36.5)
MCV RBC AUTO: 85.7 FL (ref 80–99)
MONOCYTES # BLD: 0.6 K/UL (ref 0–1)
MONOCYTES NFR BLD: 5 % (ref 5–13)
NEUTS SEG # BLD: 9.6 K/UL (ref 1.8–8)
NEUTS SEG NFR BLD: 78 % (ref 32–75)
NRBC # BLD: 0 K/UL (ref 0–0.01)
NRBC BLD-RTO: 0 PER 100 WBC
P-R INTERVAL, ECG05: 142 MS
PLATELET # BLD AUTO: 140 K/UL (ref 150–400)
POTASSIUM SERPL-SCNC: 4.4 MMOL/L (ref 3.5–5.1)
PROCALCITONIN SERPL-MCNC: 0.13 NG/ML
Q-T INTERVAL, ECG07: 362 MS
QRS DURATION, ECG06: 90 MS
QTC CALCULATION (BEZET), ECG08: 471 MS
RBC # BLD AUTO: 5.38 M/UL (ref 4.1–5.7)
SODIUM SERPL-SCNC: 140 MMOL/L (ref 136–145)
TROPONIN-HIGH SENSITIVITY: 221 NG/L (ref 0–76)
TROPONIN-HIGH SENSITIVITY: 253 NG/L (ref 0–76)
TROPONIN-HIGH SENSITIVITY: 259 NG/L (ref 0–76)
TROPONIN-HIGH SENSITIVITY: 86 NG/L (ref 0–76)
VENTRICULAR RATE, ECG03: 102 BPM
WBC # BLD AUTO: 12.2 K/UL (ref 4.1–11.1)

## 2023-01-05 PROCEDURE — 74011000250 HC RX REV CODE- 250: Performed by: INTERNAL MEDICINE

## 2023-01-05 PROCEDURE — 84145 PROCALCITONIN (PCT): CPT

## 2023-01-05 PROCEDURE — 84484 ASSAY OF TROPONIN QUANT: CPT

## 2023-01-05 PROCEDURE — 74011250636 HC RX REV CODE- 250/636: Performed by: INTERNAL MEDICINE

## 2023-01-05 PROCEDURE — 71045 X-RAY EXAM CHEST 1 VIEW: CPT

## 2023-01-05 PROCEDURE — 87635 SARS-COV-2 COVID-19 AMP PRB: CPT

## 2023-01-05 PROCEDURE — 74011250637 HC RX REV CODE- 250/637: Performed by: INTERNAL MEDICINE

## 2023-01-05 PROCEDURE — 80048 BASIC METABOLIC PNL TOTAL CA: CPT

## 2023-01-05 PROCEDURE — 83735 ASSAY OF MAGNESIUM: CPT

## 2023-01-05 PROCEDURE — 74011250636 HC RX REV CODE- 250/636: Performed by: STUDENT IN AN ORGANIZED HEALTH CARE EDUCATION/TRAINING PROGRAM

## 2023-01-05 PROCEDURE — 93005 ELECTROCARDIOGRAM TRACING: CPT

## 2023-01-05 PROCEDURE — 94660 CPAP INITIATION&MGMT: CPT

## 2023-01-05 PROCEDURE — 36415 COLL VENOUS BLD VENIPUNCTURE: CPT

## 2023-01-05 PROCEDURE — 71250 CT THORAX DX C-: CPT

## 2023-01-05 PROCEDURE — 99285 EMERGENCY DEPT VISIT HI MDM: CPT

## 2023-01-05 PROCEDURE — 83880 ASSAY OF NATRIURETIC PEPTIDE: CPT

## 2023-01-05 PROCEDURE — 85025 COMPLETE CBC W/AUTO DIFF WBC: CPT

## 2023-01-05 PROCEDURE — 65270000029 HC RM PRIVATE

## 2023-01-05 PROCEDURE — 96374 THER/PROPH/DIAG INJ IV PUSH: CPT

## 2023-01-05 RX ORDER — ACETAMINOPHEN 325 MG/1
650 TABLET ORAL
Status: DISCONTINUED | OUTPATIENT
Start: 2023-01-05 | End: 2023-01-07 | Stop reason: HOSPADM

## 2023-01-05 RX ORDER — ACETAMINOPHEN 650 MG/1
650 SUPPOSITORY RECTAL
Status: DISCONTINUED | OUTPATIENT
Start: 2023-01-05 | End: 2023-01-07 | Stop reason: HOSPADM

## 2023-01-05 RX ORDER — PANTOPRAZOLE SODIUM 40 MG/1
40 TABLET, DELAYED RELEASE ORAL DAILY
Status: DISCONTINUED | OUTPATIENT
Start: 2023-01-05 | End: 2023-01-07 | Stop reason: HOSPADM

## 2023-01-05 RX ORDER — FUROSEMIDE 10 MG/ML
40 INJECTION INTRAMUSCULAR; INTRAVENOUS DAILY
Status: CANCELLED | OUTPATIENT
Start: 2023-01-06 | End: 2023-01-09

## 2023-01-05 RX ORDER — FOLIC ACID 1 MG/1
1 TABLET ORAL DAILY
Status: DISCONTINUED | OUTPATIENT
Start: 2023-01-05 | End: 2023-01-07 | Stop reason: HOSPADM

## 2023-01-05 RX ORDER — FUROSEMIDE 10 MG/ML
80 INJECTION INTRAMUSCULAR; INTRAVENOUS
Status: COMPLETED | OUTPATIENT
Start: 2023-01-05 | End: 2023-01-05

## 2023-01-05 RX ORDER — ONDANSETRON 4 MG/1
4 TABLET, ORALLY DISINTEGRATING ORAL
Status: DISCONTINUED | OUTPATIENT
Start: 2023-01-05 | End: 2023-01-07 | Stop reason: HOSPADM

## 2023-01-05 RX ORDER — CARVEDILOL 12.5 MG/1
25 TABLET ORAL 2 TIMES DAILY
Status: DISCONTINUED | OUTPATIENT
Start: 2023-01-05 | End: 2023-01-07 | Stop reason: HOSPADM

## 2023-01-05 RX ORDER — SODIUM CHLORIDE 0.9 % (FLUSH) 0.9 %
5-40 SYRINGE (ML) INJECTION AS NEEDED
Status: DISCONTINUED | OUTPATIENT
Start: 2023-01-05 | End: 2023-01-07 | Stop reason: HOSPADM

## 2023-01-05 RX ORDER — ENOXAPARIN SODIUM 100 MG/ML
30 INJECTION SUBCUTANEOUS EVERY 12 HOURS
Status: DISCONTINUED | OUTPATIENT
Start: 2023-01-05 | End: 2023-01-07 | Stop reason: HOSPADM

## 2023-01-05 RX ORDER — LEVOFLOXACIN 5 MG/ML
750 INJECTION, SOLUTION INTRAVENOUS EVERY 24 HOURS
Status: DISCONTINUED | OUTPATIENT
Start: 2023-01-05 | End: 2023-01-07 | Stop reason: HOSPADM

## 2023-01-05 RX ORDER — ENOXAPARIN SODIUM 100 MG/ML
40 INJECTION SUBCUTANEOUS DAILY
Status: DISCONTINUED | OUTPATIENT
Start: 2023-01-05 | End: 2023-01-05 | Stop reason: DRUGHIGH

## 2023-01-05 RX ORDER — ONDANSETRON 2 MG/ML
4 INJECTION INTRAMUSCULAR; INTRAVENOUS
Status: DISCONTINUED | OUTPATIENT
Start: 2023-01-05 | End: 2023-01-07 | Stop reason: HOSPADM

## 2023-01-05 RX ORDER — POTASSIUM CHLORIDE 750 MG/1
10 TABLET, FILM COATED, EXTENDED RELEASE ORAL 2 TIMES DAILY
Status: DISCONTINUED | OUTPATIENT
Start: 2023-01-05 | End: 2023-01-07 | Stop reason: HOSPADM

## 2023-01-05 RX ORDER — ATORVASTATIN CALCIUM 40 MG/1
80 TABLET, FILM COATED ORAL DAILY
Status: DISCONTINUED | OUTPATIENT
Start: 2023-01-05 | End: 2023-01-07 | Stop reason: HOSPADM

## 2023-01-05 RX ORDER — ASPIRIN 81 MG/1
81 TABLET ORAL DAILY
Status: DISCONTINUED | OUTPATIENT
Start: 2023-01-05 | End: 2023-01-07 | Stop reason: HOSPADM

## 2023-01-05 RX ORDER — CHLORTHALIDONE 25 MG/1
25 TABLET ORAL DAILY
Status: DISCONTINUED | OUTPATIENT
Start: 2023-01-05 | End: 2023-01-07 | Stop reason: HOSPADM

## 2023-01-05 RX ORDER — SODIUM CHLORIDE 0.9 % (FLUSH) 0.9 %
5-40 SYRINGE (ML) INJECTION EVERY 8 HOURS
Status: DISCONTINUED | OUTPATIENT
Start: 2023-01-05 | End: 2023-01-07 | Stop reason: HOSPADM

## 2023-01-05 RX ORDER — POLYETHYLENE GLYCOL 3350 17 G/17G
17 POWDER, FOR SOLUTION ORAL DAILY PRN
Status: DISCONTINUED | OUTPATIENT
Start: 2023-01-05 | End: 2023-01-07 | Stop reason: HOSPADM

## 2023-01-05 RX ORDER — LANOLIN ALCOHOL/MO/W.PET/CERES
1000 CREAM (GRAM) TOPICAL DAILY
Status: DISCONTINUED | OUTPATIENT
Start: 2023-01-05 | End: 2023-01-07 | Stop reason: HOSPADM

## 2023-01-05 RX ORDER — FUROSEMIDE 10 MG/ML
40 INJECTION INTRAMUSCULAR; INTRAVENOUS EVERY 8 HOURS
Status: DISCONTINUED | OUTPATIENT
Start: 2023-01-05 | End: 2023-01-06

## 2023-01-05 RX ADMIN — FOLIC ACID 1 MG: 1 TABLET ORAL at 11:41

## 2023-01-05 RX ADMIN — CARVEDILOL 25 MG: 12.5 TABLET, FILM COATED ORAL at 11:41

## 2023-01-05 RX ADMIN — PANTOPRAZOLE SODIUM 40 MG: 40 TABLET, DELAYED RELEASE ORAL at 11:41

## 2023-01-05 RX ADMIN — POTASSIUM CHLORIDE 10 MEQ: 750 TABLET, FILM COATED, EXTENDED RELEASE ORAL at 21:28

## 2023-01-05 RX ADMIN — LEVOFLOXACIN 750 MG: 5 INJECTION, SOLUTION INTRAVENOUS at 13:37

## 2023-01-05 RX ADMIN — ATORVASTATIN CALCIUM 80 MG: 40 TABLET, FILM COATED ORAL at 13:37

## 2023-01-05 RX ADMIN — ASPIRIN 81 MG: 81 TABLET, COATED ORAL at 11:41

## 2023-01-05 RX ADMIN — FUROSEMIDE 40 MG: 10 INJECTION, SOLUTION INTRAMUSCULAR; INTRAVENOUS at 14:03

## 2023-01-05 RX ADMIN — FUROSEMIDE 80 MG: 10 INJECTION, SOLUTION INTRAMUSCULAR; INTRAVENOUS at 05:44

## 2023-01-05 RX ADMIN — CARVEDILOL 25 MG: 12.5 TABLET, FILM COATED ORAL at 21:28

## 2023-01-05 RX ADMIN — ENOXAPARIN SODIUM 30 MG: 100 INJECTION SUBCUTANEOUS at 21:28

## 2023-01-05 RX ADMIN — POTASSIUM CHLORIDE 10 MEQ: 750 TABLET, FILM COATED, EXTENDED RELEASE ORAL at 11:41

## 2023-01-05 RX ADMIN — FUROSEMIDE 40 MG: 10 INJECTION, SOLUTION INTRAMUSCULAR; INTRAVENOUS at 21:28

## 2023-01-05 RX ADMIN — SODIUM CHLORIDE, PRESERVATIVE FREE 10 ML: 5 INJECTION INTRAVENOUS at 21:28

## 2023-01-05 RX ADMIN — POLYETHYLENE GLYCOL 3350 17 G: 17 POWDER, FOR SOLUTION ORAL at 21:38

## 2023-01-05 RX ADMIN — ENOXAPARIN SODIUM 30 MG: 100 INJECTION SUBCUTANEOUS at 12:49

## 2023-01-05 NOTE — MED STUDENT NOTES
History & Physical    Primary Care Provider: Melva Hamm MD  Source of Information: Patient/family     History of Presenting Illness:   Sarina Garibay is a 60 y.o. male with a past medical history of HTN, HLD, obesity who presents with acute shortness of breath onset 0230 today. Ems was called and the patient was noted to be hypoxic in the field with an O2 saturation in the 80s. He was start on CPAP and transported to the ER. Where he was subsequently started on BiPAP given his work of breathing and rales on auscultation. The patient denies any history of CHF. He cannot recall the last time he had an Echocardiogram and he sees Dr. Rashawn Heath as his Cardiologist. He describes over the last several months he has had progressive dyspnea on exertion, where now he can barely walk around the house without becoming short of breath. He notes progressive orthopnea as well, as he can no longer sleep flat. He denies any leg swelling. In May of 2022 the patient had a drug-eluting stent placed in the mid-LAD. He notes that approximately 2 weeks ago he had upper respiratory infection symptoms of cough, runny nose and sneezing. He denies any fevers, chills, chest pain, nausea, vomiting, abdominal pain, diarrhea at this time. Review of Systems:  A comprehensive review of systems was negative except for that written in the History of Present Illness.      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 Authorizing Provider   chlorthalidone (HYGROTON) 25 mg tablet TAKE 1 TABLET BY MOUTH EVERY DAY 22   Linda Chacon MD   atorvastatin (LIPITOR) 80 mg tablet TAKE 1 TABLET BY MOUTH EVERY DAY 22   Linda Chacon MD   potassium chloride SA (MICRO-K) 10 mEq capsule Take 1 Capsule by mouth daily for 180 days. 22  Linda Chacon MD   pantoprazole (PROTONIX) 40 mg tablet Take 1 Tablet by mouth daily. 22   Linda Chacon MD   folic acid (FOLVITE) 1 mg tablet Take 1 mg by mouth daily. Provider, Historical   carvediloL (COREG) 25 mg tablet Take 25 mg by mouth two (2) times a day. 22   Provider, Historical   aspirin delayed-release 81 mg tablet Take 1 Tablet by mouth daily. 21   Provider, Historical   cyanocobalamin 1,000 mcg tablet Take 1,000 mcg by mouth daily.     Provider, Historical   linaCLOtide (Linzess) 290 mcg cap capsule TAKE 1 CAPSULE BY MOUTH EVERY DAY AS NEEDED 3/8/22   Yuriy Knight NP   lubiPROStone (AMITIZA) 24 mcg capsule TAKE 1 CAPSULE BY MOUTH TWICE DAILY WITH MEALS 10/11/21   Jelena Singer MD       No Known Allergies     Family History   Problem Relation Age of Onset    Hypertension Mother     Diabetes Mother     High Cholesterol Mother     Hypertension Father     High Cholesterol Father         Social History     Socioeconomic History    Marital status:    Tobacco Use    Smoking status: Former     Packs/day: 1.00     Years: 40.00     Pack years: 40.00     Types: Cigarettes     Quit date:      Years since quittin.0    Smokeless tobacco: Never   Vaping Use Vaping Use: Never used   Substance and Sexual Activity    Alcohol use: Yes     Comment: occassionally    Drug use: Never     Social Determinants of Health     Financial Resource Strain: Low Risk     Difficulty of Paying Living Expenses: Not very hard   Food Insecurity: No Food Insecurity    Worried About Running Out of Food in the Last Year: Never true    Ran Out of Food in the Last Year: Never true   Physical Activity: Insufficiently Active    Days of Exercise per Week: 2 days    Minutes of Exercise per Session: 20 min   Social Connections: Moderately Integrated    Frequency of Communication with Friends and Family: Twice a week    Frequency of Social Gatherings with Friends and Family: Twice a week    Attends [employer] Services: 1 to 4 times per year    Active Member of [employer] or Organizations: No    Attends Club or Organization Meetings: Never    Marital Status:    Housing Stability: [address] to Pay for Housing in the Last Year: No    Number of [place] in the Last Year: 1    Unstable Housing in the Last Year: No            CODE STATUS:  DNR    Full X   Other      Objective:     Physical Exam:     Visit Vitals  BP (!) 153/95   Pulse 99   Temp 97 °F (36.1 °C)   Resp 23   Wt 299 lb (135.6 kg)   SpO2 97%   BMI 40.55 kg/m²      O2 Device: BIPAP    General:  Alert, cooperative, no distress, appears stated age. Head:  Normocephalic, without obvious abnormality, atraumatic. Eyes:  Conjunctivae/corneas clear. PERRL, EOMs intact. Nose: Nares normal. Septum midline. Mucosa normal. No drainage or sinus tenderness. Throat: Lips, mucosa, and tongue normal. Teeth and gums normal.   Neck: Supple, symmetrical, trachea midline, no adenopathy, thyroid: no enlargement/tenderness/nodules, no carotid bruit and no JVD. Back:   Symmetric, no curvature. ROM normal. No CVA tenderness. Lungs:   Faint rales present on auscultation. Patient is on BiPAP. Chest wall:  No tenderness or deformity.    Heart: Regular rate and rhythm, S1, S2 normal, no murmur, click, rub or gallop. Abdomen:   Soft, non-tender. Bowel sounds normal. No masses,  No organomegaly. Extremities: Extremities normal, atraumatic, no cyanosis or edema. Pulses: 2+ and symmetric all extremities. Skin: Skin color, texture, turgor normal. No rashes or lesions   Neurologic: CNII-XII intact. No motor or sensory deficits. 24 Hour Results:    Recent Results (from the past 24 hour(s))   EKG, 12 LEAD, INITIAL    Collection Time: 01/05/23  5:39 AM   Result Value Ref Range    Ventricular Rate 102 BPM    Atrial Rate 102 BPM    P-R Interval 142 ms    QRS Duration 90 ms    Q-T Interval 362 ms    QTC Calculation (Bezet) 471 ms    Calculated P Axis 58 degrees    Calculated R Axis -34 degrees    Calculated T Axis 64 degrees    Diagnosis       Sinus tachycardia with frequent and consecutive Premature ventricular   complexes  Left axis deviation  Abnormal ECG  When compared with ECG of 03-JUN-2022 13:46,  Premature ventricular complexes are now Present  Vent.  rate has increased BY  50 BPM  T wave inversion no longer evident in Inferior leads  QT has lengthened  Confirmed by Jose Dwyer ([id number]) on 1/5/2023 7:57:00 AM     MAGNESIUM    Collection Time: 01/05/23  5:40 AM   Result Value Ref Range    Magnesium 2.4 1.6 - 2.4 mg/dL   TROPONIN-HIGH SENSITIVITY    Collection Time: 01/05/23  5:40 AM   Result Value Ref Range    Troponin-High Sensitivity 86 (H) 0 - 76 ng/L   METABOLIC PANEL, BASIC    Collection Time: 01/05/23  5:40 AM   Result Value Ref Range    Sodium 140 136 - 145 mmol/L    Potassium 4.4 3.5 - 5.1 mmol/L    Chloride 104 97 - 108 mmol/L    CO2 27 21 - 32 mmol/L    Anion gap 9 5 - 15 mmol/L    Glucose 225 (H) 65 - 100 mg/dL    BUN 23 (H) 6 - 20 mg/dL    Creatinine 1.06 0.70 - 1.30 mg/dL    BUN/Creatinine ratio 22 (H) 12 - 20      eGFR >60 >60 ml/min/1.73m2    Calcium 9.6 8.5 - 10.1 mg/dL   CBC WITH AUTOMATED DIFF    Collection Time: 01/05/23 5:40 AM   Result Value Ref Range    WBC 12.2 (H) 4.1 - 11.1 K/uL    RBC 5.38 4.10 - 5.70 M/uL    HGB 13.8 12.1 - 17.0 g/dL    HCT 46.1 36.6 - 50.3 %    MCV 85.7 80.0 - 99.0 FL    MCH 25.7 (L) 26.0 - 34.0 PG    MCHC 29.9 (L) 30.0 - 36.5 g/dL    RDW 22.7 (H) 11.5 - 14.5 %    PLATELET 312 (L) 082 - 400 K/uL    NRBC 0.0 0.0  WBC    ABSOLUTE NRBC 0.00 0.00 - 0.01 K/uL    NEUTROPHILS 78 (H) 32 - 75 %    LYMPHOCYTES 15 12 - 49 %    MONOCYTES 5 5 - 13 %    EOSINOPHILS 2 0 - 7 %    BASOPHILS 0 0 - 1 %    IMMATURE GRANULOCYTES 0 0 - 0.5 %    ABS. NEUTROPHILS 9.6 (H) 1.8 - 8.0 K/UL    ABS. LYMPHOCYTES 1.8 0.8 - 3.5 K/UL    ABS. MONOCYTES 0.6 0.0 - 1.0 K/UL    ABS. EOSINOPHILS 0.2 0.0 - 0.4 K/UL    ABS. BASOPHILS 0.0 0.0 - 0.1 K/UL    ABS. IMM. GRANS. 0.0 0.00 - 0.04 K/UL    DF AUTOMATED     NT-PRO BNP    Collection Time: 01/05/23  5:40 AM   Result Value Ref Range    NT pro- (H) <125 pg/mL   COVID-19 RAPID TEST    Collection Time: 01/05/23  5:40 AM   Result Value Ref Range    COVID-19 rapid test Not Detected Not Detected           Imaging:   XR CHEST PORT   Final Result   Mild diffuse interstitial and airspace opacities could be edema and/or   pneumonia. Assessment:   Acute flash pulmonary edema secondary to congestive heart failure vs hypertensive flash pulmonary edema    Type II myocardial injury    Coronary artery disease    Essential hypertension    Hyperlipidemia    Obesity    MDM:  Semaj Cordoba is a 60 y.o. male with a past medical history of HTN, HLD, obesity who presents with acute shortness of breath onset 0230 today. Ems was called and the patient was noted to be hypoxic and tachypneic in the field with an O2 saturation in the 80s. He was started on BiPAP in the ED given his work of breathing. Patients ED work-up was notable for mild leukocytosis, hyperglycemia, mildly elevated high-sensitivity troponin and elevated NT pro-BNP (400).  CXR showed mild diffuse interstitial and airspace opacities. EKG showed sinus tachycardia with frequent PVCs. Given the patients progressive history and work-up differential diagnoses include congestive heart failure, hypertensive flash pulmonary edema, PE, type II myocardial injury, MI, pneumonia, COPD exacerbation/asthma exacerbation at this time. PE was considered given the patients hypoxia and tachycardia, however given the patient's progressive nature of his symptoms, CHF is higher on the differential. If Echo does not show signs of CHF, will consider CT of the chest.     Plan:   - Admit for cardiac monitoring and suspect CHF exacerbation  - Cardiology consult and Echo order for cardiac evaluation. Will follow-up Echo results  - Repeat high-sensitivity troponin, however expect elevation to be secondary to type II myocardial injury  - Strict Is and Os for close monitoring of patient's response to diuretic  - 40mg of IV lasix scheduled starting tomorrow, given patient has already received 80mg of IV Lasix in ED      Home medications were reviewed    Signed By: Alexus Dougherty     January 5, 2023        *ATTENTION:  This note has been created by a medical student for educational purposes only. Please do not refer to the content of this note for clinical decision-making, billing, or other purposes. Please see attending physicians note to obtain clinical information on this patient. *

## 2023-01-05 NOTE — CONSULTS
Cardiology Consult    NAME: Sarina Garibay   :  1960   MRN:  [id number]     Date/Time:  2023 9:43 AM    Patient PCP: Melva Hamm MD  ________________________________________________________________________     Assessment:   Acute shortness of breath, possible pneumonia  Acute CHF  Indeterminate troponin elevation-->86  CAD status post remote PCI's  Carotid disease  HTN  Hyperlipidemia  History of TIA  Recurrent dizzy spells      Plan:   Continue with diuretics  Trend cardiac enzymes  Agree with echo to assess EF and filling pressures  Continue dietary sodium restriction      []        High complexity decision making was performed        Subjective:   CHIEF COMPLAINT: SOB      REASON FOR CONSULT: SOB      HISTORY OF PRESENT ILLNESS:     Sarina Garibay is a 60 y.o. BLACK/ male who has a history of CAD status post cardiac cath in  that showed no significant progression of his underlying CAD. He had PCI of the LAD May 6, 2022 and subsequent cardiac cath 6/3/2022 for chest pain showed widely patent stent in the LAD. He suffers from hyperlipidemia on statin therapy, hypertension, obesity, and previous TIA with paresthesias of the right arm. CTA of the neck showed total occlusion of the right ICA with plaque in both carotids April vessels. Was seen by vascular surgery and recommended conservative management. Lamin Palm He presented to the emergency room in the early hours of the morning after waking up at 2:30 in the morning extremely short of breath and unable to breathe normally. He was panicked and called EMS. Report, he was hypoxic with O2 saturations in the 80s. He was started on BiPAP and this morning he is feeling back to baseline. He notes that approximately 2 weeks ago he had upper respiratory infection symptoms of cough, runny nose and sneezing. He denies any fevers, chills, chest pain, nausea, vomiting, abdominal pain, diarrhea at this time.         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    Obesity 2020    Tinea barbae 2020    Vitamin D deficiency 2020      Past Surgical History:   Procedure Laterality Date    HX COLONOSCOPY      HX HEART CATHETERIZATION      cardiac stent placement    HX HEART CATHETERIZATION      HX HERNIA REPAIR      HX ORTHOPAEDIC Right     procedure on knee     No Known Allergies   Meds:  See below  Social History     Tobacco Use    Smoking status: Former     Packs/day: 1.00     Years: 40.00     Pack years: 40.00     Types: Cigarettes     Quit date:      Years since quittin.0    Smokeless tobacco: Never   Substance Use Topics    Alcohol use: Yes     Comment: occassionally      Family History   Problem Relation Age of Onset    Hypertension Mother     Diabetes Mother     High Cholesterol Mother     Hypertension Father     High Cholesterol Father        REVIEW OF SYSTEMS:     []         Unable to obtain  ROS due to ---   [x]         Total of 12 systems reviewed as follows:    Constitutional: negative fever, negative chills, negative weight loss  Eyes:   negative visual changes  ENT:   negative sore throat, tongue or lip swelling  Respiratory:  +cough, + dyspnea  Cards:  negative for chest pain, palpitations, lower extremity edema  GI:   negative for nausea, vomiting, diarrhea, and abdominal pain  Genitourinary: negative for frequency, dysuria  Integument:  negative for rash   Hematologic:  negative for easy bruising and gum/nose bleeding  Musculoskel: negative for myalgias,  back pain  Neurological:  negative for headaches, dizziness, vertigo, weakness  Behavl/Psych: negative for feelings of anxiety, depression     Pertinent Positives include :    Objective:      Physical Exam:    Last 24hrs VS reviewed since prior progress note. Most recent are:    Visit Vitals  BP (!) 153/95   Pulse 99   Temp 97 °F (36.1 °C)   Resp 23   Wt 135.6 kg (299 lb)   SpO2 97%   BMI 40.55 kg/m²     No intake or output data in the 24 hours ending 01/05/23 0943     General Appearance: Well developed, alert, no acute distress. Ears/Nose/Mouth/Throat: Moist mucosa  Neck: Supple. JVP within normal limits. Carotids good upstrokes  Chest: Lungs clear to auscultation bilaterally. Cardiovascular: Regular rate and rhythm, S1S2 normal, soft systolic murmur  Abdomen: Soft, non-tender, bowel sounds are active. Extremities: No edema bilaterally. distal pulses +1. Skin: Warm and dry. Neuro: Alert and oriented x3, normal speech; follows simple commands  Psychiatric: Cooperative; appropriate    []         Post-cath site without hematoma, bruit, tenderness, or thrill. Distal pulses intact. Data:      Telemetry: NSR with PVC's    EKG: NSR, no ischemai    []  No new EKG for review. Prior to Admission medications    Medication Sig Start Date End Date Taking? Authorizing Provider   chlorthalidone (HYGROTON) 25 mg tablet TAKE 1 TABLET BY MOUTH EVERY DAY 12/5/22   Ilana Dooley MD   atorvastatin (LIPITOR) 80 mg tablet TAKE 1 TABLET BY MOUTH EVERY DAY 12/5/22   Ilana Dooley MD   potassium chloride SA (MICRO-K) 10 mEq capsule Take 1 Capsule by mouth daily for 180 days. 8/23/22 2/19/23  Ilana Dooley MD   pantoprazole (PROTONIX) 40 mg tablet Take 1 Tablet by mouth daily. 8/23/22   Ilana Dooley MD   folic acid (FOLVITE) 1 mg tablet Take 1 mg by mouth daily.     Provider, Historical   carvediloL (COREG) 25 mg tablet Take 25 mg by mouth two (2) times a day. 2/14/22   Provider, Historical   aspirin delayed-release 81 mg tablet Take 1 Tablet by mouth daily. 8/5/21   Provider, Historical   cyanocobalamin 1,000 mcg tablet Take 1,000 mcg by mouth daily. Provider, Historical   linaCLOtide (Linzess) 290 mcg cap capsule TAKE 1 CAPSULE BY MOUTH EVERY DAY AS NEEDED 3/8/22   Drake Kramer NP   lubiPROStone (AMITIZA) 24 mcg capsule TAKE 1 CAPSULE BY MOUTH TWICE DAILY WITH MEALS 10/11/21   Abdon Baez MD       Recent Results (from the past 24 hour(s))   EKG, 12 LEAD, INITIAL    Collection Time: 01/05/23  5:39 AM   Result Value Ref Range    Ventricular Rate 102 BPM    Atrial Rate 102 BPM    P-R Interval 142 ms    QRS Duration 90 ms    Q-T Interval 362 ms    QTC Calculation (Bezet) 471 ms    Calculated P Axis 58 degrees    Calculated R Axis -34 degrees    Calculated T Axis 64 degrees    Diagnosis       Sinus tachycardia with frequent and consecutive Premature ventricular   complexes  Left axis deviation  Abnormal ECG  When compared with ECG of 03-JUN-2022 13:46,  Premature ventricular complexes are now Present  Vent.  rate has increased BY  50 BPM  T wave inversion no longer evident in Inferior leads  QT has lengthened  Confirmed by Michelle Carnes ([id number]) on 1/5/2023 7:57:00 AM     MAGNESIUM    Collection Time: 01/05/23  5:40 AM   Result Value Ref Range    Magnesium 2.4 1.6 - 2.4 mg/dL   TROPONIN-HIGH SENSITIVITY    Collection Time: 01/05/23  5:40 AM   Result Value Ref Range    Troponin-High Sensitivity 86 (H) 0 - 76 ng/L   METABOLIC PANEL, BASIC    Collection Time: 01/05/23  5:40 AM   Result Value Ref Range    Sodium 140 136 - 145 mmol/L    Potassium 4.4 3.5 - 5.1 mmol/L    Chloride 104 97 - 108 mmol/L    CO2 27 21 - 32 mmol/L    Anion gap 9 5 - 15 mmol/L    Glucose 225 (H) 65 - 100 mg/dL    BUN 23 (H) 6 - 20 mg/dL    Creatinine 1.06 0.70 - 1.30 mg/dL    BUN/Creatinine ratio 22 (H) 12 - 20      eGFR >60 >60 ml/min/1.73m2    Calcium 9.6 8.5 - 10.1 mg/dL   CBC WITH AUTOMATED DIFF    Collection Time: 01/05/23  5:40 AM   Result Value Ref Range    WBC 12.2 (H) 4.1 - 11.1 K/uL    RBC 5.38 4.10 - 5.70 M/uL    HGB 13.8 12.1 - 17.0 g/dL    HCT 46.1 36.6 - 50.3 %    MCV 85.7 80.0 - 99.0 FL    MCH 25.7 (L) 26.0 - 34.0 PG    MCHC 29.9 (L) 30.0 - 36.5 g/dL    RDW 22.7 (H) 11.5 - 14.5 %    PLATELET 113 (L) 351 - 400 K/uL    NRBC 0.0 0.0  WBC    ABSOLUTE NRBC 0.00 0.00 - 0.01 K/uL    NEUTROPHILS 78 (H) 32 - 75 %    LYMPHOCYTES 15 12 - 49 %    MONOCYTES 5 5 - 13 %    EOSINOPHILS 2 0 - 7 %    BASOPHILS 0 0 - 1 %    IMMATURE GRANULOCYTES 0 0 - 0.5 %    ABS. NEUTROPHILS 9.6 (H) 1.8 - 8.0 K/UL    ABS. LYMPHOCYTES 1.8 0.8 - 3.5 K/UL    ABS. MONOCYTES 0.6 0.0 - 1.0 K/UL    ABS. EOSINOPHILS 0.2 0.0 - 0.4 K/UL    ABS. BASOPHILS 0.0 0.0 - 0.1 K/UL    ABS. IMM.  GRANS. 0.0 0.00 - 0.04 K/UL    DF AUTOMATED     NT-PRO BNP    Collection Time: 01/05/23  5:40 AM   Result Value Ref Range    NT pro- (H) <125 pg/mL   COVID-19 RAPID TEST    Collection Time: 01/05/23  5:40 AM   Result Value Ref Range    COVID-19 rapid test Not Detected Not Detected          Bel Arcos MD

## 2023-01-05 NOTE — H&P
History & Physical    Primary Care Provider: Emmanuel Horton MD  Source of Information: Patient/family     History of Presenting Illness:   Dudley Montero is a 60 y.o. male with a past medical history of HTN, HLD, obesity who presents with acute shortness of breath onset 0230 today. Ems was called and the patient was noted to be hypoxic in the field with an O2 saturation in the 80s. He was start on CPAP and transported to the ER. Where he was subsequently started on BiPAP given his work of breathing and rales on auscultation. The patient denies any history of CHF. He cannot recall the last time he had an Echocardiogram and he sees Dr. Agnes Love as his Cardiologist. He describes over the last several months he has had progressive dyspnea on exertion, where now he can barely walk around the house without becoming short of breath. He notes progressive orthopnea as well, as he can no longer sleep flat. He denies any leg swelling. In May of 2022 the patient had a drug-eluting stent placed in the mid-LAD. He notes that approximately 2 weeks ago he had upper respiratory infection symptoms of cough, runny nose and sneezing. He denies any fevers, chills, chest pain, nausea, vomiting, abdominal pain, diarrhea at this time. Review of Systems:  A comprehensive review of systems was negative except for that written in the History of Present Illness.      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 Authorizing Provider   chlorthalidone (HYGROTON) 25 mg tablet TAKE 1 TABLET BY MOUTH EVERY DAY 22   Epi Bennett MD   atorvastatin (LIPITOR) 80 mg tablet TAKE 1 TABLET BY MOUTH EVERY DAY 22   Epi Bennett MD   potassium chloride SA (MICRO-K) 10 mEq capsule Take 1 Capsule by mouth daily for 180 days. 22  Epi Bennett MD   pantoprazole (PROTONIX) 40 mg tablet Take 1 Tablet by mouth daily. 22   Epi Bennett MD   folic acid (FOLVITE) 1 mg tablet Take 1 mg by mouth daily. Provider, Historical   carvediloL (COREG) 25 mg tablet Take 25 mg by mouth two (2) times a day. 22   Provider, Historical   aspirin delayed-release 81 mg tablet Take 1 Tablet by mouth daily. 21   Provider, Historical   cyanocobalamin 1,000 mcg tablet Take 1,000 mcg by mouth daily.     Provider, Historical   linaCLOtide (Linzess) 290 mcg cap capsule TAKE 1 CAPSULE BY MOUTH EVERY DAY AS NEEDED 3/8/22   Deejay Cazares NP   lubiPROStone (AMITIZA) 24 mcg capsule TAKE 1 CAPSULE BY MOUTH TWICE DAILY WITH MEALS 10/11/21   Anel Rogers MD       No Known Allergies     Family History   Problem Relation Age of Onset    Hypertension Mother     Diabetes Mother     High Cholesterol Mother     Hypertension Father     High Cholesterol Father         Social History     Socioeconomic History    Marital status:    Tobacco Use    Smoking status: Former     Packs/day: 1.00     Years: 40.00     Pack years: 40.00     Types: Cigarettes     Quit date:      Years since quittin.0    Smokeless tobacco: Never   Vaping Use Vaping Use: Never used   Substance and Sexual Activity    Alcohol use: Yes     Comment: occassionally    Drug use: Never     Social Determinants of Health     Financial Resource Strain: Low Risk     Difficulty of Paying Living Expenses: Not very hard   Food Insecurity: No Food Insecurity    Worried About Running Out of Food in the Last Year: Never true    Ran Out of Food in the Last Year: Never true   Physical Activity: Insufficiently Active    Days of Exercise per Week: 2 days    Minutes of Exercise per Session: 20 min   Social Connections: Moderately Integrated    Frequency of Communication with Friends and Family: Twice a week    Frequency of Social Gatherings with Friends and Family: Twice a week    Attends [employer] Services: 1 to 4 times per year    Active Member of [employer] or Organizations: No    Attends Club or Organization Meetings: Never    Marital Status:    Housing Stability: [address] to Pay for Housing in the Last Year: No    Number of [place] in the Last Year: 1    Unstable Housing in the Last Year: No            CODE STATUS:  DNR    Full X   Other      Objective:     Physical Exam:     Visit Vitals  BP (!) 153/95   Pulse 99   Temp 97 °F (36.1 °C)   Resp 23   Wt 135.6 kg (299 lb)   SpO2 97%   BMI 40.55 kg/m²      O2 Device: BIPAP    General:  Alert, cooperative, no distress, appears stated age. Head:  Normocephalic, without obvious abnormality, atraumatic. Eyes:  Conjunctivae/corneas clear. PERRL, EOMs intact. Nose: Nares normal. Septum midline. Mucosa normal. No drainage or sinus tenderness. Throat: Lips, mucosa, and tongue normal. Teeth and gums normal.   Neck: Supple, symmetrical, trachea midline, no adenopathy, thyroid: no enlargement/tenderness/nodules, no carotid bruit and no JVD. Back:   Symmetric, no curvature. ROM normal. No CVA tenderness. Lungs:   Faint rales present on auscultation. Patient is on BiPAP. Chest wall:  No tenderness or deformity.    Heart: Regular rate and rhythm, S1, S2 normal, no murmur, click, rub or gallop. Abdomen:   Soft, non-tender. Bowel sounds normal. No masses,  No organomegaly. Extremities: Extremities normal, atraumatic, no cyanosis or edema. Pulses: 2+ and symmetric all extremities. Skin: Skin color, texture, turgor normal. No rashes or lesions   Neurologic: CNII-XII intact. No motor or sensory deficits. 24 Hour Results:    Recent Results (from the past 24 hour(s))   EKG, 12 LEAD, INITIAL    Collection Time: 01/05/23  5:39 AM   Result Value Ref Range    Ventricular Rate 102 BPM    Atrial Rate 102 BPM    P-R Interval 142 ms    QRS Duration 90 ms    Q-T Interval 362 ms    QTC Calculation (Bezet) 471 ms    Calculated P Axis 58 degrees    Calculated R Axis -34 degrees    Calculated T Axis 64 degrees    Diagnosis       Sinus tachycardia with frequent and consecutive Premature ventricular   complexes  Left axis deviation  Abnormal ECG  When compared with ECG of 03-JUN-2022 13:46,  Premature ventricular complexes are now Present  Vent.  rate has increased BY  50 BPM  T wave inversion no longer evident in Inferior leads  QT has lengthened  Confirmed by Jarod Matias ([id number]) on 1/5/2023 7:57:00 AM     MAGNESIUM    Collection Time: 01/05/23  5:40 AM   Result Value Ref Range    Magnesium 2.4 1.6 - 2.4 mg/dL   TROPONIN-HIGH SENSITIVITY    Collection Time: 01/05/23  5:40 AM   Result Value Ref Range    Troponin-High Sensitivity 86 (H) 0 - 76 ng/L   METABOLIC PANEL, BASIC    Collection Time: 01/05/23  5:40 AM   Result Value Ref Range    Sodium 140 136 - 145 mmol/L    Potassium 4.4 3.5 - 5.1 mmol/L    Chloride 104 97 - 108 mmol/L    CO2 27 21 - 32 mmol/L    Anion gap 9 5 - 15 mmol/L    Glucose 225 (H) 65 - 100 mg/dL    BUN 23 (H) 6 - 20 mg/dL    Creatinine 1.06 0.70 - 1.30 mg/dL    BUN/Creatinine ratio 22 (H) 12 - 20      eGFR >60 >60 ml/min/1.73m2    Calcium 9.6 8.5 - 10.1 mg/dL   CBC WITH AUTOMATED DIFF    Collection Time: 01/05/23 5:40 AM   Result Value Ref Range    WBC 12.2 (H) 4.1 - 11.1 K/uL    RBC 5.38 4.10 - 5.70 M/uL    HGB 13.8 12.1 - 17.0 g/dL    HCT 46.1 36.6 - 50.3 %    MCV 85.7 80.0 - 99.0 FL    MCH 25.7 (L) 26.0 - 34.0 PG    MCHC 29.9 (L) 30.0 - 36.5 g/dL    RDW 22.7 (H) 11.5 - 14.5 %    PLATELET 064 (L) 262 - 400 K/uL    NRBC 0.0 0.0  WBC    ABSOLUTE NRBC 0.00 0.00 - 0.01 K/uL    NEUTROPHILS 78 (H) 32 - 75 %    LYMPHOCYTES 15 12 - 49 %    MONOCYTES 5 5 - 13 %    EOSINOPHILS 2 0 - 7 %    BASOPHILS 0 0 - 1 %    IMMATURE GRANULOCYTES 0 0 - 0.5 %    ABS. NEUTROPHILS 9.6 (H) 1.8 - 8.0 K/UL    ABS. LYMPHOCYTES 1.8 0.8 - 3.5 K/UL    ABS. MONOCYTES 0.6 0.0 - 1.0 K/UL    ABS. EOSINOPHILS 0.2 0.0 - 0.4 K/UL    ABS. BASOPHILS 0.0 0.0 - 0.1 K/UL    ABS. IMM. GRANS. 0.0 0.00 - 0.04 K/UL    DF AUTOMATED     NT-PRO BNP    Collection Time: 01/05/23  5:40 AM   Result Value Ref Range    NT pro- (H) <125 pg/mL   COVID-19 RAPID TEST    Collection Time: 01/05/23  5:40 AM   Result Value Ref Range    COVID-19 rapid test Not Detected Not Detected           Imaging:   XR CHEST PORT   Final Result   Mild diffuse interstitial and airspace opacities could be edema and/or   pneumonia. CT CHEST WO CONT    (Results Pending)           Assessment:   Acute flash pulmonary edema secondary to congestive heart failure vs hypertensive flash pulmonary edema   -Consider also atypical pneumonia although less likely   -Rule out acute coronary syndrome precipitating the above    Coronary artery disease with history of PCI    Essential hypertension    Hyperlipidemia    Obesity    MDM:  Emma Rodriguez is a 60 y.o. male with a past medical history of HTN, HLD, obesity who presents with acute shortness of breath onset 0230 today. Ems was called and the patient was noted to be hypoxic and tachypneic in the field with an O2 saturation in the 80s. He was started on BiPAP in the ED given his work of breathing.  Patients ED work-up was notable for mild leukocytosis, hyperglycemia, mildly elevated high-sensitivity troponin and elevated NT pro-BNP (400). CXR showed mild diffuse interstitial and airspace opacities. EKG showed sinus tachycardia with frequent PVCs. Given the patients progressive history and work-up differential diagnoses include congestive heart failure, hypertensive flash pulmonary edema, PE, type II myocardial injury, MI, pneumonia, COPD exacerbation/asthma exacerbation at this time. PE was considered given the patients hypoxia and tachycardia, however given the patient's progressive nature of his symptoms, CHF is higher on the differential. If Echo does not show signs of CHF, will consider CT of the chest.     Plan:   - Admit for cardiac monitoring and suspect CHF exacerbation  - Cardiology consult and Echo order for cardiac evaluation.  Will follow-up Echo results  - Repeat high-sensitivity troponin, however expect elevation to be secondary to type II myocardial injury  - Strict Is and Os for close monitoring of patient's response to diuretic  - 40mg of IV lasix scheduled starting tomorrow, given patient has already received 80mg of IV Lasix in ED   -We will obtain CT of chest without contrast for further evaluation  -I discussed with Dr. Francoise Ag medications were reviewed    Signed By: Mary Alice Roper MD     January 5, 2023

## 2023-01-05 NOTE — ED TRIAGE NOTES
EMS reports sudden onset SOB, hypertensive upon their arrival. RA sats in the 80s, pt arrives on cpap and is diaphoretic

## 2023-01-05 NOTE — PROGRESS NOTES
Reason for Admission:  CHF                     RUR Score:    9%                 Plan for utilizing home health:   No HH @ this time/uses no DME. PCP: First and Last name:  Linda Chacon MD     Name of Practice:    Are you a current patient: Yes/No: Yes   Approximate date of last visit: Seen in December. Can you participate in a virtual visit with your PCP: Yes/Call. Current Advanced Directive/Advance Care Plan: Full Code      Healthcare Decision Maker:              Primary Decision Maker: Valdemar Riojas - [facility] - [phone]                  Transition of Care Plan:                    D/C Plan is home & wife to transport. Send Rxs to [facility] on [facility] upon discharge.

## 2023-01-05 NOTE — ED NOTES
TRANSFER - OUT REPORT:    Verbal report given to alvina(name) on Sarina Garibay  being transferred to (unit) for routine progression of care       Report consisted of patients Situation, Background, Assessment and   Recommendations(SBAR). Information from the following report(s) SBAR and ED Summary was reviewed with the receiving nurse. Lines:   Peripheral IV 01/05/23 Anterior;Left;Proximal Forearm (Active)        Opportunity for questions and clarification was provided.       Patient transported with:   Monitor  Registered Nurse

## 2023-01-05 NOTE — ED NOTES
Bedside and Verbal shift change report given to Get Waller RN (oncoming nurse) by Mirian Cervantes RN (offgoing nurse). Report included the following information SBAR, ED Summary and MAR.

## 2023-01-05 NOTE — Clinical Note
Status[de-identified] INPATIENT [101]   Type of Bed: Intensive Care [6]   Cardiac Monitoring Required?: Yes   Inpatient Hospitalization Certified Necessary for the Following Reasons: 3.  Patient receiving treatment that can only be provided in an inpatient setting (further clarification in H&P documentation)   Admitting Diagnosis: Acute CHF [facility]) [[id number]]   Admitting Physician: Josef Nogueira   Attending Physician: Josef Nogueira   Estimated Length of Stay: 2 Midnights   Discharge Plan[de-identified] Home with Office Follow-up

## 2023-01-05 NOTE — ACP (ADVANCE CARE PLANNING)
Advance Care Planning   Healthcare Decision Maker:       Primary Decision Maker: Queta Mckinley [facility] - [phone]

## 2023-01-05 NOTE — ED PROVIDER NOTES
[address]  EMERGENCY DEPARTMENT ENCOUNTER NOTE        Date: 1/5/2023  Patient Name: Deneen Malloy      History of Presenting Illness     Chief Complaint   Patient presents with    Respiratory Distress       History Provided By: Patient and EMS    HPI: Deneen Malloy, 60 y.o. male with PMH of HTN, HLD, and obesity who comes to the ED with acute respiratory distress. He reports that he woke up in the overnight feeling short of breath. EMS were called and patient was found to be hypoxic in the field. Started on BiPAP due to rales. No prior history of CHF. Patient reports that he has been feeling his normal health over the last several days except for having upper respiratory tract viral infection last week with runny nose, nasal congestion, sore throat and coughing. He reports no symptoms prior to going to sleep tonight. Patient symptoms are high in severity, get better with BiPAP and no additional aggravating leaving factors with no associated symptoms. He denies any lower extremity swelling or pain. No prior history of CKD. PCP: Jan Guerrero MD    Current Outpatient Medications   Medication Sig Dispense Refill    chlorthalidone (HYGROTON) 25 mg tablet TAKE 1 TABLET BY MOUTH EVERY DAY 30 Tablet 1    atorvastatin (LIPITOR) 80 mg tablet TAKE 1 TABLET BY MOUTH EVERY DAY 90 Tablet 0    potassium chloride SA (MICRO-K) 10 mEq capsule Take 1 Capsule by mouth daily for 180 days. 90 Capsule 1    pantoprazole (PROTONIX) 40 mg tablet Take 1 Tablet by mouth daily. 90 Tablet 1    folic acid (FOLVITE) 1 mg tablet Take 1 mg by mouth daily. carvediloL (COREG) 25 mg tablet Take 25 mg by mouth two (2) times a day. aspirin delayed-release 81 mg tablet Take 1 Tablet by mouth daily. cyanocobalamin 1,000 mcg tablet Take 1,000 mcg by mouth daily.       linaCLOtide (Linzess) 290 mcg cap capsule TAKE 1 CAPSULE BY MOUTH EVERY DAY AS NEEDED 30 Capsule 2 lubiPROStone (AMITIZA) 24 mcg capsule TAKE 1 CAPSULE BY MOUTH TWICE DAILY WITH MEALS 180 Capsule 1       Past History     Past Medical History:  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    Obesity 2020    Tinea barbae 2020    Vitamin D deficiency 2020       Past Surgical History:  Past Surgical History:   Procedure Laterality Date    HX COLONOSCOPY      HX HEART CATHETERIZATION      cardiac stent placement    HX HEART CATHETERIZATION      HX HERNIA REPAIR      HX ORTHOPAEDIC Right     procedure on knee       Family History:  Family History   Problem Relation Age of Onset    Hypertension Mother     Diabetes Mother     High Cholesterol Mother     Hypertension Father     High Cholesterol Father        Social History:  Social History     Tobacco Use    Smoking status: Former     Packs/day: 1.00     Years: 40.00     Pack years: 40.00     Types: Cigarettes     Quit date:      Years since quittin.0    Smokeless tobacco: Never   Vaping Use    Vaping Use: Never used   Substance Use Topics    Alcohol use: Yes     Comment: occassionally    Drug use: Never       Allergies:  No Known Allergies      Review of Systems     Review of Systems    A 10 point review of system was performed and was negative except as noted above in HPI    Physical Exam     Physical Exam  Vitals and nursing note reviewed. Constitutional:       General: He is in acute distress. Appearance: He is obese.  He is not ill-appearing, toxic-appearing or diaphoretic. HENT:      Head: Normocephalic and atraumatic. Cardiovascular:      Rate and Rhythm: Regular rhythm. Tachycardia present. Heart sounds: Normal heart sounds. Pulmonary:      Effort: Tachypnea and respiratory distress present. Breath sounds: Rales present. Abdominal:      Palpations: Abdomen is soft. Tenderness: There is no abdominal tenderness. Musculoskeletal:      Cervical back: Normal range of motion and neck supple. Right lower leg: No tenderness. No edema. Left lower leg: No tenderness. No edema. Skin:     General: Skin is warm and dry. Neurological:      Mental Status: He is alert and oriented to person, place, and time.        Lab and Diagnostic Study Results     Labs -     Recent Results (from the past 12 hour(s))   MAGNESIUM    Collection Time: 01/05/23  5:40 AM   Result Value Ref Range    Magnesium 2.4 1.6 - 2.4 mg/dL   TROPONIN-HIGH SENSITIVITY    Collection Time: 01/05/23  5:40 AM   Result Value Ref Range    Troponin-High Sensitivity 86 (H) 0 - 76 ng/L   METABOLIC PANEL, BASIC    Collection Time: 01/05/23  5:40 AM   Result Value Ref Range    Sodium 140 136 - 145 mmol/L    Potassium 4.4 3.5 - 5.1 mmol/L    Chloride 104 97 - 108 mmol/L    CO2 27 21 - 32 mmol/L    Anion gap 9 5 - 15 mmol/L    Glucose 225 (H) 65 - 100 mg/dL    BUN 23 (H) 6 - 20 mg/dL    Creatinine 1.06 0.70 - 1.30 mg/dL    BUN/Creatinine ratio 22 (H) 12 - 20      eGFR >60 >60 ml/min/1.73m2    Calcium 9.6 8.5 - 10.1 mg/dL   CBC WITH AUTOMATED DIFF    Collection Time: 01/05/23  5:40 AM   Result Value Ref Range    WBC 12.2 (H) 4.1 - 11.1 K/uL    RBC 5.38 4.10 - 5.70 M/uL    HGB 13.8 12.1 - 17.0 g/dL    HCT 46.1 36.6 - 50.3 %    MCV 85.7 80.0 - 99.0 FL    MCH 25.7 (L) 26.0 - 34.0 PG    MCHC 29.9 (L) 30.0 - 36.5 g/dL    RDW 22.7 (H) 11.5 - 14.5 %    PLATELET 659 (L) 227 - 400 K/uL    NRBC 0.0 0.0  WBC    ABSOLUTE NRBC 0.00 0.00 - 0.01 K/uL    NEUTROPHILS 78 (H) 32 - 75 %    LYMPHOCYTES 15 12 - 49 %    MONOCYTES 5 5 - 13 %    EOSINOPHILS 2 0 - 7 %    BASOPHILS 0 0 - 1 %    IMMATURE GRANULOCYTES 0 0 - 0.5 %    ABS. NEUTROPHILS 9.6 (H) 1.8 - 8.0 K/UL    ABS. LYMPHOCYTES 1.8 0.8 - 3.5 K/UL    ABS. MONOCYTES 0.6 0.0 - 1.0 K/UL    ABS. EOSINOPHILS 0.2 0.0 - 0.4 K/UL    ABS. BASOPHILS 0.0 0.0 - 0.1 K/UL    ABS. IMM. GRANS. 0.0 0.00 - 0.04 K/UL    DF AUTOMATED     NT-PRO BNP    Collection Time: 01/05/23  5:40 AM   Result Value Ref Range    NT pro- (H) <125 pg/mL   COVID-19 RAPID TEST    Collection Time: 01/05/23  5:40 AM   Result Value Ref Range    COVID-19 rapid test Not Detected Not Detected         Radiologic Studies -   [unfilled]  CT Results  (Last 48 hours)      None          CXR Results  (Last 48 hours)                 01/05/23 0554  XR CHEST PORT Final result    Impression:  Mild diffuse interstitial and airspace opacities could be edema and/or   pneumonia. Narrative:  INDICATION: SOB       EXAM:  AP CHEST RADIOGRAPH       COMPARISON: None       FINDINGS:       AP portable view of the chest demonstrates borderline cardiomegaly. Diffuse   interstitial and airspace opacities in both lungs. No definite pleural effusion   or pneumothorax. . The osseous structures are unremarkable. Medical Decision Making and ED Course   - I am the first and primary provider for this patient AND AM THE PRIMARY PROVIDER OF RECORD. - I reviewed the vital signs, available nursing notes, past medical history, past surgical history, family history and social history. - Initial assessment performed. The patients presenting problems have been discussed, and the staff are in agreement with the care plan formulated and outlined with them. I have encouraged them to ask questions as they arise throughout their visit. Vital Signs-Reviewed the patient's vital signs.     Patient Vitals for the past 24 hrs:   Temp Pulse Resp BP SpO2   01/05/23 0609 -- 99 23 (!) 153/95 97 %   01/05/23 0553 -- 100 24 (!) 165/107 97 %   01/05/23 0546 -- -- -- -- 95 %   01/05/23 0524 97 °F (36.1 °C) (!) 116 28 (!) 167/109 100 %   01/05/23 0520 -- -- -- -- 100 %       Records Reviewed: Nursing Notes      Medical Decision Making     Patient presents to the ED with acute respiratory distress. He denies prior history of CHF or similar symptoms. He does have history of hypertension and hyperlipidemia. No recent symptoms aside from URI. He does not have any lower extremity swelling or pain. His lung examination revealed diffuse rales throughout. Right now, he feels much better that he is on BiPAP. Has not had an echo in quite some times. I suspect this is an undiagnosed CHF. Highly unlikely to be secondary to COPD. Symptoms are more pronounced with inspiratory wheezing and rales rather than expiratory. Additionally, highly unlikely to be secondary to PE. He does not have any chest pain, lower extremity swelling, and the fact that improved with BiPAP makes it less likely that its PE. However, we will initiate his work-up by obtaining CBC, chemistry, troponin, proBNP, chest x-ray and an EKG. Given his recent illness will get COVID to rule out myocarditis as a cause of his symptoms. In the interim, will perform point-of-care ultrasound to evaluate his heart and lungs. ED Course & Reassessment     Medications ordered:  Medications   furosemide (LASIX) injection 80 mg (80 mg IntraVENous Given 1/5/23 0544)       ED Course:     ED Course as of 01/05/23 0749   Thu Jan 05, 2023   0537 I did perform and interpret point-of-care ultrasound. Limited windows due to patient habitus. However, examination revealed a dilated left ventricle with moderate to severely reduced ejection fraction. No pericardial effusion appreciated. I did not appreciate RV enlargement. His lung showed diffuse B-lines all the way up to the apices of both lungs.  [AA]   0540 EKG was done at 5:39 AM interpreted by me as sinus tachycardia rate of 102, intervals within normal, left axis deviation, no specific ST elevation or depression meeting criteria, no signs of dysrhythmia nor blocks. Frequent PVCs noted. [AA]   0721 Review and interpret the patient work-up. He has mild leukocytosis which could be secondary to his URI. Otherwise, no anemia. Mild thrombocytopenia. His chemistry showed mild hyperglycemia. However, there is no CHAD or electrolyte derangement. proBNP is slightly elevated. Troponin is elevated which is very likely from demand ischemia. His EKG did not show signs of acute changes. COVID test is negative. We will discussed with the hospitalist for admission. Sanam Diaz   [phone] Cussed with the hospitalist who accepted the patient for hospitalization. [AA]      ED Course User Index  [AA] Sanam Pereira MD       Procedures and Critical Care       Performed by: Froylan Neri MD  PROCEDURES:  Critical Care  Performed by: Sanam Pereira MD  Authorized by: Sanam Pereira MD     Critical care provider statement:     Critical care time (minutes):  37    Critical care time was exclusive of:  Separately billable procedures and treating other patients    Critical care was necessary to treat or prevent imminent or life-threatening deterioration of the following conditions:  Cardiac failure    Critical care was time spent personally by me on the following activities:  Development of treatment plan with patient or surrogate, evaluation of patient's response to treatment, examination of patient, obtaining history from patient or surrogate, ordering and performing treatments and interventions, ordering and review of laboratory studies, ordering and review of radiographic studies, pulse oximetry and re-evaluation of patient's condition    I assumed direction of critical care for this patient from another provider in my specialty: no      Care discussed with: admitting provider         Diagnosis     Clinical Impression:   1. Acute pulmonary edema (HCC)    2. Hypertension, unspecified type    3. Elevated troponin        Disposition     Disposition: Condition improved    Admitted    Attestations: Froylan Neri MD    Please note that this dictation was completed with [employer], the computer voice recognition software. Quite often unanticipated grammatical, syntax, homophones, and other interpretive errors are inadvertently transcribed by the computer software. Please disregard these errors. Please excuse any errors that have escaped final proofreading. Thank you.

## 2023-01-06 ENCOUNTER — APPOINTMENT (OUTPATIENT)
Dept: NON INVASIVE DIAGNOSTICS | Age: 63
End: 2023-01-06
Attending: INTERNAL MEDICINE
Payer: COMMERCIAL

## 2023-01-06 PROCEDURE — 65270000029 HC RM PRIVATE

## 2023-01-06 PROCEDURE — 74011250637 HC RX REV CODE- 250/637: Performed by: INTERNAL MEDICINE

## 2023-01-06 PROCEDURE — C8923 2D TTE W OR W/O FOL W/CON,CO: HCPCS

## 2023-01-06 PROCEDURE — 74011250636 HC RX REV CODE- 250/636: Performed by: INTERNAL MEDICINE

## 2023-01-06 PROCEDURE — 74011000250 HC RX REV CODE- 250: Performed by: INTERNAL MEDICINE

## 2023-01-06 RX ORDER — FUROSEMIDE 40 MG/1
40 TABLET ORAL
Status: DISCONTINUED | OUTPATIENT
Start: 2023-01-06 | End: 2023-01-07 | Stop reason: HOSPADM

## 2023-01-06 RX ORDER — CLOPIDOGREL BISULFATE 75 MG/1
75 TABLET ORAL DAILY
Status: DISCONTINUED | OUTPATIENT
Start: 2023-01-06 | End: 2023-01-07 | Stop reason: HOSPADM

## 2023-01-06 RX ADMIN — POTASSIUM CHLORIDE 10 MEQ: 750 TABLET, FILM COATED, EXTENDED RELEASE ORAL at 08:34

## 2023-01-06 RX ADMIN — ASPIRIN 81 MG: 81 TABLET, COATED ORAL at 08:33

## 2023-01-06 RX ADMIN — CLOPIDOGREL BISULFATE 75 MG: 75 TABLET ORAL at 14:28

## 2023-01-06 RX ADMIN — CHLORTHALIDONE 25 MG: 25 TABLET ORAL at 08:34

## 2023-01-06 RX ADMIN — ATORVASTATIN CALCIUM 80 MG: 40 TABLET, FILM COATED ORAL at 08:34

## 2023-01-06 RX ADMIN — SODIUM CHLORIDE, PRESERVATIVE FREE 10 ML: 5 INJECTION INTRAVENOUS at 21:16

## 2023-01-06 RX ADMIN — POTASSIUM CHLORIDE 10 MEQ: 750 TABLET, FILM COATED, EXTENDED RELEASE ORAL at 21:16

## 2023-01-06 RX ADMIN — PANTOPRAZOLE SODIUM 40 MG: 40 TABLET, DELAYED RELEASE ORAL at 08:34

## 2023-01-06 RX ADMIN — CARVEDILOL 25 MG: 12.5 TABLET, FILM COATED ORAL at 08:34

## 2023-01-06 RX ADMIN — Medication 1000 MCG: at 08:33

## 2023-01-06 RX ADMIN — FUROSEMIDE 40 MG: 40 TABLET ORAL at 16:50

## 2023-01-06 RX ADMIN — FOLIC ACID 1 MG: 1 TABLET ORAL at 08:33

## 2023-01-06 RX ADMIN — LEVOFLOXACIN 750 MG: 5 INJECTION, SOLUTION INTRAVENOUS at 08:34

## 2023-01-06 RX ADMIN — CARVEDILOL 25 MG: 12.5 TABLET, FILM COATED ORAL at 21:16

## 2023-01-06 RX ADMIN — FUROSEMIDE 40 MG: 10 INJECTION, SOLUTION INTRAMUSCULAR; INTRAVENOUS at 06:04

## 2023-01-06 RX ADMIN — ENOXAPARIN SODIUM 30 MG: 100 INJECTION SUBCUTANEOUS at 21:16

## 2023-01-06 RX ADMIN — SODIUM CHLORIDE, PRESERVATIVE FREE 10 ML: 5 INJECTION INTRAVENOUS at 06:03

## 2023-01-06 NOTE — PROGRESS NOTES
Problem: Falls - Risk of  Goal: *Absence of Falls  Description: Document Paty Guzman Fall Risk and appropriate interventions in the flowsheet.   Outcome: Progressing Towards Goal  Note: Fall Risk Interventions:  Mobility Interventions: OT consult for ADLs, PT Consult for mobility concerns         Medication Interventions: Bed/chair exit alarm

## 2023-01-06 NOTE — PROGRESS NOTES
Hospitalist Progress Note               Daily Progress Note: 1/6/2023      Subjective:   Hospital course to date:  Tamir Saldaña is a 60 y.o. male with a past medical history of HTN, HLD, obesity who presents with acute shortness of breath onset 0230 today. Ems was called and the patient was noted to be hypoxic in the field with an O2 saturation in the 80s. He was start on CPAP and transported to the ER. Where he was subsequently started on BiPAP given his work of breathing and rales on auscultation. The patient denies any history of CHF. He cannot recall the last time he had an Echocardiogram and he sees Dr. Rome Ruiz as his Cardiologist. He describes over the last several months he has had progressive dyspnea on exertion, where now he can barely walk around the house without becoming short of breath. He notes progressive orthopnea as well, as he can no longer sleep flat. He denies any leg swelling. In May of 2022 the patient had a drug-eluting stent placed in the mid-LAD. He notes that approximately 2 weeks ago he had upper respiratory infection symptoms of cough, runny nose and sneezing. Overnight, the patient's troponin continued to uptrend to a peak of 259. Procalcitonin was elevated at 0.13. Cardiology elected for the patient to undergo a cardiac cath.     -------  Patient was resting comfortably in the bed, requiring no supplemental oxygen. He endorses frequent urination with lasix dosing. He has an Echo scheduled as well. He denies any complaints this morning. I have reviewed patient's presenting subjective and objective findings, as well as all laboratory studies, imaging studies, and vital signs to date as well as treatment rendered and patient's response to those treatments. In addition, prior medical, surgical and relevant social and family histories were reviewed.         Problem List:  Problem List as of 1/6/2023 Date Reviewed: 3/31/2021            Codes Class Noted - Resolved    [facility] ([facility] ICD-10-CM: I50.9  ICD-9-CM: 428.0  1/5/2023 - Present        Mixed hyperlipidemia ICD-10-CM: E78.2  ICD-9-CM: 272.2  9/7/2022 - Present        Irritable bowel syndrome with constipation ICD-10-CM: K58.1  ICD-9-CM: 564.1  5/25/2022 - Present        Chest pain ICD-10-CM: R07.9  ICD-9-CM: 786.50  5/6/2022 - Present        Atypical chest pain ICD-10-CM: R07.89  ICD-9-CM: 786.59  8/26/2020 - Present        Essential hypertension ICD-10-CM: I10  ICD-9-CM: 401.9  8/26/2020 - Present        Hypertensive heart disease ICD-10-CM: I11.9  ICD-9-CM: 402.90  8/26/2020 - Present        Constipation ICD-10-CM: K59.00  ICD-9-CM: 564.00  8/26/2020 - Present        Contact dermatitis ICD-10-CM: L25.9  ICD-9-CM: 692.9  8/26/2020 - Present        Coronary arteriosclerosis in native artery ICD-10-CM: I25.10  ICD-9-CM: 414.01  8/26/2020 - Present        Disorder of esophagus ICD-10-CM: K22.9  ICD-9-CM: 530.9  8/26/2020 - Present        Edema ICD-10-CM: R60.9  ICD-9-CM: 782.3  8/26/2020 - Present        Enthesopathy of knee ICD-10-CM: M76.899  ICD-9-CM: 726.60  8/26/2020 - Present        GERD (gastroesophageal reflux disease) ICD-10-CM: K21.9  ICD-9-CM: 530.81  8/26/2020 - Present        Hypercholesterolemia ICD-10-CM: E78.00  ICD-9-CM: 272.0  8/26/2020 - Present        Obesity ICD-10-CM: E66.9  ICD-9-CM: 278.00  8/26/2020 - Present        Tinea barbae ICD-10-CM: B35.0  ICD-9-CM: 110.0  8/26/2020 - Present        Vitamin D deficiency ICD-10-CM: E55.9  ICD-9-CM: 268.9  8/26/2020 - Present         Medications reviewed  Current Facility-Administered Medications   Medication Dose Route Frequency    clopidogreL (PLAVIX) tablet 75 mg  75 mg Oral DAILY    levoFLOXacin (LEVAQUIN) 750 mg in D5W IVPB  750 mg IntraVENous Q24H    aspirin delayed-release tablet 81 mg  81 mg Oral DAILY    atorvastatin (LIPITOR) tablet 80 mg  80 mg Oral DAILY    carvediloL (COREG) tablet 25 mg  25 mg Oral BID    chlorthalidone (HYGROTON) tablet 25 mg  25 mg Oral DAILY    cyanocobalamin tablet 1,000 mcg  1,000 mcg Oral DAILY    folic acid (FOLVITE) tablet 1 mg  1 mg Oral DAILY    pantoprazole (PROTONIX) tablet 40 mg  40 mg Oral DAILY    potassium chloride SR (KLOR-CON 10) tablet 10 mEq  10 mEq Oral BID    sodium chloride (NS) flush 5-40 mL  5-40 mL IntraVENous Q8H    sodium chloride (NS) flush 5-40 mL  5-40 mL IntraVENous PRN    acetaminophen (TYLENOL) tablet 650 mg  650 mg Oral Q6H PRN    Or    acetaminophen (TYLENOL) suppository 650 mg  650 mg Rectal Q6H PRN    polyethylene glycol (MIRALAX) packet 17 g  17 g Oral DAILY PRN    ondansetron (ZOFRAN ODT) tablet 4 mg  4 mg Oral Q8H PRN    Or    ondansetron (ZOFRAN) injection 4 mg  4 mg IntraVENous Q6H PRN    furosemide (LASIX) injection 40 mg  40 mg IntraVENous Q8H    enoxaparin (LOVENOX) injection 30 mg  30 mg SubCUTAneous Q12H       Review of Systems:   A comprehensive review of systems was negative except for that written in the HPI. Objective:   Physical Exam:     Visit Vitals  BP (!) 151/95 (BP Patient Position: At rest;Semi fowlers)   Pulse 66   Temp 97.7 °F (36.5 °C)   Resp 20   Wt 143.7 kg (316 lb 11.2 oz)   SpO2 97%   BMI 42.95 kg/m²      O2 Device: None (Room air)    Temp (24hrs), Av.9 °F (36.6 °C), Min:97.7 °F (36.5 °C), Max:98.2 °F (36.8 °C)    No intake/output data recorded.  1901 -  0700  In: 400 [P.O.:400]  Out: 450 [Urine:450]    General:   Awake and alert   Lungs:   Clear to auscultation bilaterally. Chest wall:  No tenderness or deformity. Heart:  Regular rate and rhythm, S1, S2 normal, no murmur, click, rub or gallop. Abdomen:   Soft, non-tender. Bowel sounds normal. No masses,  No organomegaly. Extremities: Extremities normal, atraumatic, no cyanosis or edema. Pulses: 2+ and symmetric all extremities. Skin: Skin color, texture, turgor normal. No rashes or lesions   Neurologic: CNII-XII intact.   No gross focal deficits         Data Review:       Recent Days:  Recent Labs 01/05/23  0540   WBC 12.2*   HGB 13.8   HCT 46.1   *       Recent Labs     01/05/23  0540      K 4.4      CO2 27   *   BUN 23*   CREA 1.06   CA 9.6   MG 2.4       No results for input(s): PH, PCO2, PO2, HCO3, FIO2 in the last 72 hours. 24 Hour Results:  Recent Results (from the past 24 hour(s))   PROCALCITONIN    Collection Time: 01/05/23  1:16 PM   Result Value Ref Range    Procalcitonin 0.13 (H) 0 ng/mL   TROPONIN-HIGH SENSITIVITY    Collection Time: 01/05/23  1:16 PM   Result Value Ref Range    Troponin-High Sensitivity 253 (HH) 0 - 76 ng/L   TROPONIN-HIGH SENSITIVITY    Collection Time: 01/05/23  1:46 PM   Result Value Ref Range    Troponin-High Sensitivity 259 (HH) 0 - 76 ng/L   TROPONIN-HIGH SENSITIVITY    Collection Time: 01/05/23  5:59 PM   Result Value Ref Range    Troponin-High Sensitivity 221 (HH) 0 - 76 ng/L       CT CHEST WO CONT   Final Result   1. Multifocal ground glass opacities in the lower lobes, favor pneumonia. 2.  Trace pleural effusions bilaterally. XR CHEST PORT   Final Result   Mild diffuse interstitial and airspace opacities could be edema and/or   pneumonia. Assessment:  Acute flash pulmonary edema secondary to congestive heart failure vs hypertensive flash pulmonary edema   -Consider also atypical pneumonia     -Rule out acute coronary syndrome precipitating the above   -Rapid COVID is negative     Coronary artery disease with history of PCI     Essential hypertension     Hyperlipidemia     Obesity      Discussion/MDM:  Xin Skelton is a 60 y.o. male with a past medical history of HTN, HLD, obesity who presents with acute shortness of breath onset 0230 today. Ems was called and the patient was noted to be hypoxic and tachypneic in the field with an O2 saturation in the 80s. He was started on BiPAP in the ED given his work of breathing.  Patients ED work-up was notable for mild leukocytosis, hyperglycemia, mildly elevated high-sensitivity troponin and elevated NT pro-BNP (400). CXR showed mild diffuse interstitial and airspace opacities. EKG showed sinus tachycardia with frequent PVCs. Given the patients progressive history and work-up differential diagnoses include congestive heart failure, hypertensive flash pulmonary edema, PE, type II myocardial injury, MI, pneumonia, COPD exacerbation/asthma exacerbation at this time. PE was considered given the patients hypoxia and tachycardia, however given the patient's progressive nature of his symptoms, CHF is higher on the differential. If Echo does not show signs of CHF, will consider CT of the chest vs cardiac cath. Plan:  - Echo and cardiac catheterization planned for today given up trending Troponin  - Strict Is and Os for close monitoring of patient's response to diuretic  -We will transition over to oral diuretics    Care Plan discussed with: Patient/Family    Code status: Full    Social determinants of health: No barrier appreciated in discussion with patient and family    Disposition: Pending  Echo, potential discharge in 24 hours    Total time spent with patient: 30 minutes.     Walton Leventhal, MD

## 2023-01-06 NOTE — NURSE NAVIGATOR
Heart Failure Nurse Navigator: Heart Failure Education    RN performs hand hygiene. RN Introduces herself to the patient& wife and informs them of the reasoning for the visit. Patient Verbalizes Understanding for visit, is accepting of discussion    Persons present for Education: Patient & spouse    Time Spent: At least 60minutes    Method of teaching:  Teach-back, demonstration, verbal, visual    Education Packets Given: Living with Heart Failure book, Heart Failure symptom management calendar/tracker, Heart Failure Self Check plan, Heart Failure: Partnering Your Treatment Questionnaire, Sodium tracker, and Eat Smart with Nutrition labels.    -Confirmation of working scales & that the patient can read numbers  -Confirmation of support to assist with daily weights if patient unable to perform independently. RN-NN provided education on Daily weights to include same time daily, on same scale, and documenting weights on calendar. RN-NN provided education trigger management/ signs and symptoms of Heart Failure. Patient is advised on Yellow Days to call MD office and on Red Days to come to the ER or call 911. RN provides Nutritional education that includes how to calculate and restrict sodium and fluid intake. Encouraged fresh vegetable choice over canned/processed goods. Demonstrated how to log meals/intake. RN discusses lifestyle changes including cessation of smoking and increasing activity level. In addition, RN discusses the importance of keeping/scheduling appointments and adherence to guideline directed medical therapies. Patient & spouse was able to teach back to the RN-NN the above information. Patient & spouse will need reinforcement of education provided. Patient advised about the HF helper Mary and Mp Hodge.

## 2023-01-06 NOTE — PROGRESS NOTES
Progress Note      1/6/2023 12:27 PM  NAME: Dudley Montero   MRN:  [id number]   Admit Diagnosis: Acute CHF ([facility] [address] [I50.9]          Assessment/Plan:   Shortness of breath, improved, secondary to pneumonia, on Levaquin. Troponin leak, mild, type II MI most likely from uncontrolled blood pressure. Known coronary artery disease, status post stents to the LAD, moderate disease in RCA last catheterization 6/22. Hypertension, usually with fair control, will obtain renal arterial duplex. Hypercholesterolemia, continue statin. TIA hx, known occluded left carotid. []       High complexity decision making was performed in this patient at high risk for decompensation with multiple organ involvement. Subjective:     Dudley Montero denies chest pain. Breathing is better. Had been with a cough for 3 days. Denies fever or chills. Review of Systems:    Symptom Y/N Comments  Symptom Y/N Comments   Fever/Chills N   Chest Pain N    Poor Appetite N   Edema N    Cough N   Abdominal Pain N    Sputum N   Joint Pain N    SOB/AVINA N   Pruritis/Rash N    Nausea/vomit N   Tolerating PT/OT Y    Diarrhea N   Tolerating Diet Y    Constipation N   Other       Could NOT obtain due to:      Objective:      Physical Exam:    Last 24hrs VS reviewed since prior progress note. Most recent are:    Visit Vitals  BP (!) 151/95 (BP Patient Position: At rest;Semi fowlers)   Pulse 66   Temp 97.7 °F (36.5 °C)   Resp 20   Wt 143.7 kg (316 lb 11.2 oz)   SpO2 97%   BMI 42.95 kg/m²       Intake/Output Summary (Last 24 hours) at 1/6/2023 1227  Last data filed at 1/6/2023 0426  Gross per 24 hour   Intake 400 ml   Output 450 ml   Net -50 ml        General Appearance: Overweight male, alert; no acute distress. Ears/Nose/Mouth/Throat: Hearing grossly normal; moist mucous membranes  Neck: Supple. Chest: Lungs clear to auscultation bilaterally. Cardiovascular: Regular rate and rhythm, S1S2 normal, no murmur.   Abdomen: Soft, non-tender, bowel sounds are active. Extremities: No edema bilaterally. Skin: Warm and dry. []         Post-cath site without hematoma, bruit, tenderness, or thrill. Distal pulses intact. PMH/SH reviewed - no change compared to H&P    Data Review    Telemetry:     EKG:   []  No new EKG for review    Lab Data Personally Reviewed:    Recent Labs     01/05/23  0540   WBC 12.2*   HGB 13.8   HCT 46.1   *     No results for input(s): INR, PTP, APTT, INREXT in the last 72 hours. Recent Labs     01/05/23  0540      K 4.4      CO2 27   BUN 23*   CREA 1.06   *   CA 9.6   MG 2.4     No results for input(s): CPK, CKNDX, TROIQ in the last 72 hours. No lab exists for component: CPKMB  Lab Results   Component Value Date/Time    Cholesterol, total 171 05/05/2022 08:33 AM    HDL Cholesterol 39 05/05/2022 08:33 AM    LDL, calculated 99.8 05/05/2022 08:33 AM    Triglyceride 161 (H) 05/05/2022 08:33 AM    CHOL/HDL Ratio 4.4 05/05/2022 08:33 AM       No results for input(s): AP, TBIL, TP, ALB, GLOB, GGT, AML, LPSE in the last 72 hours. No lab exists for component: SGOT, GPT, AMYP, HLPSE  No results for input(s): PH, PCO2, PO2 in the last 72 hours.     Medications Personally Reviewed:    Current Facility-Administered Medications   Medication Dose Route Frequency    levoFLOXacin (LEVAQUIN) 750 mg in D5W IVPB  750 mg IntraVENous Q24H    aspirin delayed-release tablet 81 mg  81 mg Oral DAILY    atorvastatin (LIPITOR) tablet 80 mg  80 mg Oral DAILY    carvediloL (COREG) tablet 25 mg  25 mg Oral BID    chlorthalidone (HYGROTON) tablet 25 mg  25 mg Oral DAILY    cyanocobalamin tablet 1,000 mcg  1,000 mcg Oral DAILY    folic acid (FOLVITE) tablet 1 mg  1 mg Oral DAILY    pantoprazole (PROTONIX) tablet 40 mg  40 mg Oral DAILY    potassium chloride SR (KLOR-CON 10) tablet 10 mEq  10 mEq Oral BID    sodium chloride (NS) flush 5-40 mL  5-40 mL IntraVENous Q8H    sodium chloride (NS) flush 5-40 mL  5-40 mL IntraVENous PRN    acetaminophen (TYLENOL) tablet 650 mg  650 mg Oral Q6H PRN    Or    acetaminophen (TYLENOL) suppository 650 mg  650 mg Rectal Q6H PRN    polyethylene glycol (MIRALAX) packet 17 g  17 g Oral DAILY PRN    ondansetron (ZOFRAN ODT) tablet 4 mg  4 mg Oral Q8H PRN    Or    ondansetron (ZOFRAN) injection 4 mg  4 mg IntraVENous Q6H PRN    furosemide (LASIX) injection 40 mg  40 mg IntraVENous Q8H    enoxaparin (LOVENOX) injection 30 mg  30 mg SubCUTAneous Q12H         Bibi Connolly MD

## 2023-01-06 NOTE — PROGRESS NOTES
Physician Progress Note      Rony William  CSN #:                  [id number]  :                       1960  ADMIT DATE:       2023 5:17 AM  DISCH DATE:  RESPONDING  PROVIDER #:        Thu Dennis MD          QUERY TEXT:    Pt admitted with pulmonary edema. Pt noted to have acute respiratory distress. If possible, please document in the progress notes and discharge summary if you are evaluating and/or treating any of the following: The medical record reflects the following:  Risk Factors: 60year old male, respiratory distress, hypoxic, barely walk around without SOB  Clinical Indicators:  ED provider note - comes to the ED with acute respiratory distress. He reports that he woke up in the overnight feeling short of breath. EMS were called and patient was found to be hypoxic in the field. Started on BiPAP due to rales. Pulmonary:  Effort: Tachypnea and respiratory distress present.  RN Triage note - EMS reports sudden onset SOB, hypertensive upon their arrival. RA sats in the 80s, pt arrives on cpap and is diaphoretic  RR: 28  Treatment: BiPAP weaned to Room Air        Please email [email or web address] with any questions  Options provided:  -- Acute respiratory failure with hypoxia  -- Acute respiratory failure with hypercapnia  -- Acute respiratory failure with hypoxia and hypercapnia  -- Acute on chronic respiratory failure with hypoxia  -- Acute on chronic respiratory failure with hypercapnia  -- Acute on chronic respiratory failure with hypoxia and hypercapnia  -- Other - I will add my own diagnosis  -- Disagree - Not applicable / Not valid  -- Disagree - Clinically unable to determine / Unknown  -- Refer to Clinical Documentation Reviewer    PROVIDER RESPONSE TEXT:    This patient is in acute respiratory failure with hypoxia. Query created by:  Goldy Lizama on 2023 9:18 AM      Electronically signed by:  Thu Dennis MD 2023 2:23 PM

## 2023-01-06 NOTE — PROGRESS NOTES
Hospitalist Progress Note               Daily Progress Note: 1/6/2023      Subjective:   Hospital course to date:  Boyd Murillo is a 60 y.o. male with a past medical history of HTN, HLD, obesity who presents with acute shortness of breath onset 0230 today. Ems was called and the patient was noted to be hypoxic in the field with an O2 saturation in the 80s. He was start on CPAP and transported to the ER. Where he was subsequently started on BiPAP given his work of breathing and rales on auscultation. The patient denies any history of CHF. He cannot recall the last time he had an Echocardiogram and he sees Dr. Nadia Bello as his Cardiologist. He describes over the last several months he has had progressive dyspnea on exertion, where now he can barely walk around the house without becoming short of breath. He notes progressive orthopnea as well, as he can no longer sleep flat. He denies any leg swelling. In May of 2022 the patient had a drug-eluting stent placed in the mid-LAD. He notes that approximately 2 weeks ago he had upper respiratory infection symptoms of cough, runny nose and sneezing. Overnight, the patient's troponin continued to uptrend to a peak of 259. Procalcitonin was elevated at 0.13. Cardiology elected for the patient to undergo a cardiac cath.     -------  Patient was resting comfortably in the bed, requiring no supplemental oxygen. He endorses frequent urination with lasix dosing. He has an Echo scheduled as well. He denies any complaints this morning. I have reviewed patient's presenting subjective and objective findings, as well as all laboratory studies, imaging studies, and vital signs to date as well as treatment rendered and patient's response to those treatments. In addition, prior medical, surgical and relevant social and family histories were reviewed.         Problem List:  Problem List as of 1/6/2023 Date Reviewed: 3/31/2021            Codes Class Noted - Resolved    Acute Oncology Consultation    DIAGNOSIS:  Extensive stage small-cell lung cancer    REQUESTING PHYSICIAN:  Luli Torres MD     HISTORY OF PRESENT ILLNESS:  72 year old woman with a current ongoing smoking history, COPD referred for new diagnosis of small cell lung cancer    Patient reports that she was in her usual state of health until sometime in October.  That is when she started to notice some “lumps” on her skin.  The 1st 1 was at the nape of her neck and then she started noticing more such as her left neck and her left upper chest wall.  They did start to become more and more painful and eventually she was seen by her primary care physician.  She was referred to Dr. Torres for biopsy who obtained a CT of the chest abdomen pelvis and neck which unfortunately was markedly abnormal.  There were multiple subcutaneous masses and lymph nodes seen as well as an 8 cm soft tissue mass within the left hilar region multiple peritoneal metastases.  Dr. Torres did send her for an ultrasound-guided biopsy of an anterior abdominal wall mass and this was consistent with small-cell carcinoma.    The patient is here today accompanied by her daughter.  She reports she is having significant pain from all of these masses.  She has multiple axillary masses which are painful the left neck mass and the posterior occiput mass causes her significant distress.  She is taking 6-7 Tylenol every night to sleep.  She does not have significant issues with cough or shortness of breath.  She reports that since she quit smoking after she saw Dr. Torres her appetite has actually been better and she has indeed gained a few lb.  She denies headaches.    The patient had a cardiac calcium screen in April of 2022.  She has been smoking her entire life.  It does not appear she has ever had a CT lung screen.  She admits that she is somewhat of a “hermit”.  Her 2 best friends have passed away and her daughter lives in [place] and the patient herself  CHF ([facility] ICD-10-CM: I50.9  ICD-9-CM: 428.0  1/5/2023 - Present        Mixed hyperlipidemia ICD-10-CM: E78.2  ICD-9-CM: 272.2  9/7/2022 - Present        Irritable bowel syndrome with constipation ICD-10-CM: K58.1  ICD-9-CM: 564.1  5/25/2022 - Present        Chest pain ICD-10-CM: R07.9  ICD-9-CM: 786.50  5/6/2022 - Present        Atypical chest pain ICD-10-CM: R07.89  ICD-9-CM: 786.59  8/26/2020 - Present        Essential hypertension ICD-10-CM: I10  ICD-9-CM: 401.9  8/26/2020 - Present        Hypertensive heart disease ICD-10-CM: I11.9  ICD-9-CM: 402.90  8/26/2020 - Present        Constipation ICD-10-CM: K59.00  ICD-9-CM: 564.00  8/26/2020 - Present        Contact dermatitis ICD-10-CM: L25.9  ICD-9-CM: 692.9  8/26/2020 - Present        Coronary arteriosclerosis in native artery ICD-10-CM: I25.10  ICD-9-CM: 414.01  8/26/2020 - Present        Disorder of esophagus ICD-10-CM: K22.9  ICD-9-CM: 530.9  8/26/2020 - Present        Edema ICD-10-CM: R60.9  ICD-9-CM: 782.3  8/26/2020 - Present        Enthesopathy of knee ICD-10-CM: M76.899  ICD-9-CM: 726.60  8/26/2020 - Present        GERD (gastroesophageal reflux disease) ICD-10-CM: K21.9  ICD-9-CM: 530.81  8/26/2020 - Present        Hypercholesterolemia ICD-10-CM: E78.00  ICD-9-CM: 272.0  8/26/2020 - Present        Obesity ICD-10-CM: E66.9  ICD-9-CM: 278.00  8/26/2020 - Present        Tinea barbae ICD-10-CM: B35.0  ICD-9-CM: 110.0  8/26/2020 - Present        Vitamin D deficiency ICD-10-CM: E55.9  ICD-9-CM: 268.9  8/26/2020 - Present           Medications reviewed  Current Facility-Administered Medications   Medication Dose Route Frequency    levoFLOXacin (LEVAQUIN) 750 mg in D5W IVPB  750 mg IntraVENous Q24H    aspirin delayed-release tablet 81 mg  81 mg Oral DAILY    atorvastatin (LIPITOR) tablet 80 mg  80 mg Oral DAILY    carvediloL (COREG) tablet 25 mg  25 mg Oral BID    chlorthalidone (HYGROTON) tablet 25 mg  25 mg Oral DAILY    cyanocobalamin tablet 1,000 mcg  1,000 mcg does not really have any other support.  There is a granddaughter who just recently moved to [place].            High cholesterol                                              COPD (chronic obstructive pulmonary disease) (*               Sinusitis, chronic                                            Depression                                                    Anxiety                                                      ALLERGIES:  No Known Allergies   No family history on file.   Social History     Tobacco Use   • Smoking status: Current Every Day Smoker     Packs/day: 1.00     Types: Cigarettes   • Smokeless tobacco: Never Used   Substance Use Topics   • Alcohol use: Yes     Comment: 1 beer/evening   • Drug use: No      Current Outpatient Medications   Medication Sig Dispense Refill   • latanoprost (XALATAN) 0.005 % ophthalmic solution 1 drop nightly. Indications: Wide-Angle Glaucoma     • aclidinium (TUDORZA) 400 MCG/ACT inhaler Inhale 1 puff into the lungs 2 times daily.     • risedronate (ACTONEL) 150 MG tablet Take 150 mg by mouth every 30 days.     • MELOXICAM PO Take 7 mg by mouth.     • escitalopram (LEXAPRO) 10 MG tablet Take 10 mg by mouth daily.     • simvastatin (ZOCOR) 40 MG tablet Take 40 mg by mouth nightly.     • Calcium Acetate, Phos Binder, (CALCIUM ACETATE PO) Take 1,250 mg by mouth 2 times daily.     • cholecalciferol (VITAMIN D3) 1000 UNITS tablet Take 2,000 Units by mouth daily.     • brimonidine (ALPHAGAN P) 0.1 % SOLN Apply 1.5 mLs to eye. Rt eye only      • Fluorometholone (FML FORTE OP) Apply 10 mLs to eye. To both eyes daily      • budesonide-formoterol (SYMBICORT) 160-4.5 MCG/ACT inhaler Inhale 2 puffs into the lungs 2 times daily. unsure of dose      • albuterol (PROVENTIL HFA) 108 (90 BASE) MCG/ACT inhaler Inhale 2 puffs into the lungs every 4 hours as needed for Shortness of Breath or Wheezing. 1 Inhaler 12     No current facility-administered medications for this visit.        REVIEW  OF SYSTEMS:  As above    Nursing notes are also reviewed and accepted.    PHYSICAL EXAMINATION:  Visit Vitals  /62   Pulse 87   Temp 97.9 °F (36.6 °C)   Resp 16   Wt 56.7 kg (124 lb 14.3 oz)   SpO2 93%   BMI 18.99 kg/m²     Pleasant but somewhat chronically ill-appearing woman in no acute distress   Visible subcutaneous masses as described in the CT scan.  The left upper chest wall mass is 3 x 3 cm.  There are subcutaneous masses throughout the abdominal wall axilla, left neck, inguinal region.  The subcutaneous mass in the occiput  is purplish in color there is some concern that it is a hemangioma though I suspect it is a progressive small-cell metastatic deposit    LABORATORY DATA:    Cytologic Interpretation                                **FINAL DIAGNOSIS**     Lymph node, left abdomen, needle core biopsy:   -Small cell carcinoma.          CT CAP  IMPRESSION:     Large (8.5 cm) left hilar mass with mediastinal extension compatible with  malignancy. The mass encases the left hilar bronchovascular structures as  described and results in occlusion of left upper lobe bronchus with  resultant partial collapse of left upper lobe. Left upper lobe  consolidation adjacent to the mass could represent a component of  postobstructive pneumonia with possible associated lymphangitic  carcinomatosis.     A 2.5 cm pleural-based nodular lesion within the left upper lobe likely  represents metastasis.     A spiculated nodule within the right upper lobe may represent metastasis  versus second primary malignancy.     Extensive metastatic disease throughout the chest, abdomen, and pelvis  including juan pablo, pancreatic, adrenal, peritoneal, and soft tissue  metastases as described.    CT neck    IMPRESSION:     1.  Malignant appearing solid masses are present as outlined above.  2.  The largest is located in left level IIb (26 x 33 x 37 mm).  3.  Additional masses are in the left greater than right parotid glands,  bilateral  Oral DAILY    folic acid (FOLVITE) tablet 1 mg  1 mg Oral DAILY    pantoprazole (PROTONIX) tablet 40 mg  40 mg Oral DAILY    potassium chloride SR (KLOR-CON 10) tablet 10 mEq  10 mEq Oral BID    sodium chloride (NS) flush 5-40 mL  5-40 mL IntraVENous Q8H    sodium chloride (NS) flush 5-40 mL  5-40 mL IntraVENous PRN    acetaminophen (TYLENOL) tablet 650 mg  650 mg Oral Q6H PRN    Or    acetaminophen (TYLENOL) suppository 650 mg  650 mg Rectal Q6H PRN    polyethylene glycol (MIRALAX) packet 17 g  17 g Oral DAILY PRN    ondansetron (ZOFRAN ODT) tablet 4 mg  4 mg Oral Q8H PRN    Or    ondansetron (ZOFRAN) injection 4 mg  4 mg IntraVENous Q6H PRN    furosemide (LASIX) injection 40 mg  40 mg IntraVENous Q8H    enoxaparin (LOVENOX) injection 30 mg  30 mg SubCUTAneous Q12H       Review of Systems:   A comprehensive review of systems was negative except for that written in the HPI. Objective:   Physical Exam:     Visit Vitals  BP (!) 151/96 (BP Patient Position: At rest;Semi fowlers)   Pulse 65   Temp 97.9 °F (36.6 °C)   Resp 20   Wt 316 lb 11.2 oz (143.7 kg)   SpO2 96%   BMI 42.95 kg/m²      O2 Device: None (Room air)    Temp (24hrs), Av °F (36.7 °C), Min:97.9 °F (36.6 °C), Max:98.2 °F (36.8 °C)    No intake/output data recorded.  1901 -  0700  In: 400 [P.O.:400]  Out: 450 [Urine:450]    General:   Awake and alert   Lungs:   Clear to auscultation bilaterally. Chest wall:  No tenderness or deformity. Heart:  Regular rate and rhythm, S1, S2 normal, no murmur, click, rub or gallop. Abdomen:   Soft, non-tender. Bowel sounds normal. No masses,  No organomegaly. Extremities: Extremities normal, atraumatic, no cyanosis or edema. Pulses: 2+ and symmetric all extremities. Skin: Skin color, texture, turgor normal. No rashes or lesions   Neurologic: CNII-XII intact.   No gross focal deficits         Data Review:       Recent Days:  Recent Labs     23  0540   WBC 12.2*   HGB 13.8   HCT 46.1   PLT 140*     Recent Labs     01/05/23  0540      K 4.4      CO2 27   *   BUN 23*   CREA 1.06   CA 9.6   MG 2.4     No results for input(s): PH, PCO2, PO2, HCO3, FIO2 in the last 72 hours. 24 Hour Results:  Recent Results (from the past 24 hour(s))   PROCALCITONIN    Collection Time: 01/05/23  1:16 PM   Result Value Ref Range    Procalcitonin 0.13 (H) 0 ng/mL   TROPONIN-HIGH SENSITIVITY    Collection Time: 01/05/23  1:16 PM   Result Value Ref Range    Troponin-High Sensitivity 253 (HH) 0 - 76 ng/L   TROPONIN-HIGH SENSITIVITY    Collection Time: 01/05/23  1:46 PM   Result Value Ref Range    Troponin-High Sensitivity 259 (HH) 0 - 76 ng/L   TROPONIN-HIGH SENSITIVITY    Collection Time: 01/05/23  5:59 PM   Result Value Ref Range    Troponin-High Sensitivity 221 (HH) 0 - 76 ng/L       CT CHEST WO CONT   Final Result   1. Multifocal ground glass opacities in the lower lobes, favor pneumonia. 2.  Trace pleural effusions bilaterally. XR CHEST PORT   Final Result   Mild diffuse interstitial and airspace opacities could be edema and/or   pneumonia. Assessment:  Acute flash pulmonary edema secondary to congestive heart failure vs hypertensive flash pulmonary edema   -Consider also atypical pneumonia although less likely   -Rule out acute coronary syndrome precipitating the above     Coronary artery disease with history of PCI     Essential hypertension     Hyperlipidemia     Obesity      Discussion/MDM:  Vince Rowell is a 60 y.o. male with a past medical history of HTN, HLD, obesity who presents with acute shortness of breath onset 0230 today. Ems was called and the patient was noted to be hypoxic and tachypneic in the field with an O2 saturation in the 80s. He was started on BiPAP in the ED given his work of breathing. Patients ED work-up was notable for mild leukocytosis, hyperglycemia, mildly elevated high-sensitivity troponin and elevated NT pro-BNP (400).  CXR showed mild diffuse level 1B, and in the subcutaneous fat in the suboccipital region.  4.  Metastatic disease is favored.      IMPRESSION AND PLAN:  Metastatic small-cell carcinoma- extensive discussion was had with the patient and her daughter.  She has metastatic small-cell carcinoma which is indeed an aggressive malignancy.  She is not surprised by this as she feels these  masses are growing rapidly.  We discussed palliative chemotherapy immunotherapy with carboplatin etoposide and atezolizumab.  I would expect a good upfront response but we did discuss that patients inevitably progress.  Because they asked about timeframe we did discuss that an average survival for metastatic small-cell is still approximately 1 year.  I would expect that we can improve her pain and her quality of life temporarily due to the responsive nature of this cancer but again at some point her disease will progress.  We discussed 4 cycles of chemotherapy immunotherapy followed by restaging and hopefully will be noticing an improvement in her disease and her symptoms.  If her disease has been controlled with 4 cycles of chemotherapy immunotherapy then we would go on to immunotherapy maintenance.      The patient does want to try treatment.  I would like to get a PET scan not that that would change our management but as a baseline and I also feel strongly about CNS imaging.  She does not think she can have an MRI and we will expedite imaging with a CT with and without contrast of the brain.    I have prescribed hydrocodone/acetaminophen but hopefully as we start treatment her pain control will improve.    Silvia Vegas MD       interstitial and airspace opacities. EKG showed sinus tachycardia with frequent PVCs. Given the patients progressive history and work-up differential diagnoses include congestive heart failure, hypertensive flash pulmonary edema, PE, type II myocardial injury, MI, pneumonia, COPD exacerbation/asthma exacerbation at this time. PE was considered given the patients hypoxia and tachycardia, however given the patient's progressive nature of his symptoms, CHF is higher on the differential. If Echo does not show signs of CHF, will consider CT of the chest vs cardiac cath. Plan:  - Echo and cardiac catheterization planned for today given up trending Troponin  - Strict Is and Os for close monitoring of patient's response to diuretic  - 40mg of IV lasix q 18hrs, will consider changing this dosing pending cardiac cath results. Care Plan discussed with: Patient/Family    Code status: Full    Social determinants of health: No barrier appreciated in discussion with patient and family    Disposition: Pending cardiac cath and Echo    Total time spent with patient: 30 minutes. John Bryan   *ATTENTION:  This note has been created by a medical student for educational purposes only. Please do not refer to the content of this note for clinical decision-making, billing, or other purposes. Please see attending physicians note to obtain clinical information on this patient. *

## 2023-01-07 VITALS
HEART RATE: 63 BPM | DIASTOLIC BLOOD PRESSURE: 81 MMHG | BODY MASS INDEX: 42.6 KG/M2 | WEIGHT: 314.1 LBS | OXYGEN SATURATION: 95 % | TEMPERATURE: 98.2 F | SYSTOLIC BLOOD PRESSURE: 124 MMHG | RESPIRATION RATE: 20 BRPM

## 2023-01-07 LAB
ECHO AO ROOT DIAM: 3.2 CM
ECHO AV AREA PEAK VELOCITY: 2.4 CM2
ECHO AV AREA VTI: 2.8 CM2
ECHO AV MEAN GRADIENT: 5 MMHG
ECHO AV MEAN VELOCITY: 1.1 M/S
ECHO AV PEAK GRADIENT: 8 MMHG
ECHO AV PEAK VELOCITY: 1.5 M/S
ECHO AV VELOCITY RATIO: 0.47
ECHO AV VTI: 27 CM
ECHO LA AREA 2C: 22.9 CM2
ECHO LA AREA 4C: 19.1 CM2
ECHO LA MAJOR AXIS: 5.4 CM
ECHO LA MINOR AXIS: 5.8 CM
ECHO LA VOL BP: 66 ML (ref 18–58)
ECHO LV EDV A2C: 51 ML
ECHO LV EDV A4C: 112 ML
ECHO LV EJECTION FRACTION A2C: 55 %
ECHO LV EJECTION FRACTION A4C: 63 %
ECHO LV EJECTION FRACTION BIPLANE: 61 % (ref 55–100)
ECHO LV ESV A2C: 23 ML
ECHO LV ESV A4C: 42 ML
ECHO LVOT AREA: 4.9 CM2
ECHO LVOT AV VTI INDEX: 0.57
ECHO LVOT DIAM: 2.5 CM
ECHO LVOT MEAN GRADIENT: 1 MMHG
ECHO LVOT PEAK GRADIENT: 2 MMHG
ECHO LVOT PEAK VELOCITY: 0.7 M/S
ECHO LVOT SV: 75.1 ML
ECHO LVOT VTI: 15.3 CM

## 2023-01-07 PROCEDURE — 74011250636 HC RX REV CODE- 250/636: Performed by: INTERNAL MEDICINE

## 2023-01-07 PROCEDURE — 74011250637 HC RX REV CODE- 250/637: Performed by: INTERNAL MEDICINE

## 2023-01-07 PROCEDURE — 74011000250 HC RX REV CODE- 250: Performed by: INTERNAL MEDICINE

## 2023-01-07 RX ORDER — LEVOFLOXACIN 750 MG/1
750 TABLET ORAL DAILY
Qty: 5 TABLET | Refills: 0 | Status: SHIPPED | OUTPATIENT
Start: 2023-01-07

## 2023-01-07 RX ORDER — FUROSEMIDE 40 MG/1
TABLET ORAL
Qty: 30 TABLET | Refills: 0 | Status: SHIPPED | OUTPATIENT
Start: 2023-01-07

## 2023-01-07 RX ORDER — CLOPIDOGREL BISULFATE 75 MG/1
75 TABLET ORAL DAILY
Qty: 30 TABLET | Refills: 0 | Status: SHIPPED | OUTPATIENT
Start: 2023-01-08

## 2023-01-07 RX ADMIN — ATORVASTATIN CALCIUM 80 MG: 40 TABLET, FILM COATED ORAL at 09:38

## 2023-01-07 RX ADMIN — CLOPIDOGREL BISULFATE 75 MG: 75 TABLET ORAL at 09:24

## 2023-01-07 RX ADMIN — Medication 1000 MCG: at 09:24

## 2023-01-07 RX ADMIN — FUROSEMIDE 40 MG: 40 TABLET ORAL at 09:38

## 2023-01-07 RX ADMIN — FOLIC ACID 1 MG: 1 TABLET ORAL at 09:24

## 2023-01-07 RX ADMIN — SODIUM CHLORIDE, PRESERVATIVE FREE 10 ML: 5 INJECTION INTRAVENOUS at 13:11

## 2023-01-07 RX ADMIN — SODIUM CHLORIDE, PRESERVATIVE FREE 10 ML: 5 INJECTION INTRAVENOUS at 05:45

## 2023-01-07 RX ADMIN — CHLORTHALIDONE 25 MG: 25 TABLET ORAL at 09:24

## 2023-01-07 RX ADMIN — LEVOFLOXACIN 750 MG: 5 INJECTION, SOLUTION INTRAVENOUS at 09:25

## 2023-01-07 RX ADMIN — POTASSIUM CHLORIDE 10 MEQ: 750 TABLET, FILM COATED, EXTENDED RELEASE ORAL at 09:38

## 2023-01-07 RX ADMIN — ASPIRIN 81 MG: 81 TABLET, COATED ORAL at 09:24

## 2023-01-07 RX ADMIN — CARVEDILOL 25 MG: 12.5 TABLET, FILM COATED ORAL at 09:24

## 2023-01-07 RX ADMIN — PANTOPRAZOLE SODIUM 40 MG: 40 TABLET, DELAYED RELEASE ORAL at 09:24

## 2023-01-07 NOTE — PROGRESS NOTES
Hospitalist Progress Note               Daily Progress Note: 1/7/2023      Subjective:   Hospital course to date:  Sarina Garibay is a 60 y.o. male with a past medical history of HTN, HLD, obesity who presents with acute shortness of breath onset 0230 today. Ems was called and the patient was noted to be hypoxic in the field with an O2 saturation in the 80s. He was start on CPAP and transported to the ER. Where he was subsequently started on BiPAP given his work of breathing and rales on auscultation. The patient denies any history of CHF. He cannot recall the last time he had an Echocardiogram and he sees Dr. Rashawn Heath as his Cardiologist. He describes over the last several months he has had progressive dyspnea on exertion, where now he can barely walk around the house without becoming short of breath. He notes progressive orthopnea as well, as he can no longer sleep flat. He denies any leg swelling. In May of 2022 the patient had a drug-eluting stent placed in the mid-LAD. He notes that approximately 2 weeks ago he had upper respiratory infection symptoms of cough, runny nose and sneezing. Overnight, the patient's troponin continued to uptrend to a peak of 259. Procalcitonin was elevated at 0.13. Cardiology elected for the patient to undergo a cardiac cath.     -------  Patient was resting comfortably in the bed, requiring no supplemental oxygen. He endorses frequent urination with lasix dosing. He has an Echo scheduled as well. He denies any complaints this morning. Patient also reports ongoing exertional chest pain which has been present for about a year and a half he says. He reports this was the reason his cath was done last year which at that time showed patent stents with a proximal RCA lesion of 60% but apparently negative iFR  of 0.96.             Problem List:  Problem List as of 1/7/2023 Date Reviewed: 3/31/2021            Codes Class Noted - Resolved    Acute CHF [facility]) ICD-10-CM: I50.9  ICD-9-CM: 428.0  1/5/2023 - Present        Mixed hyperlipidemia ICD-10-CM: E78.2  ICD-9-CM: 272.2  9/7/2022 - Present        Irritable bowel syndrome with constipation ICD-10-CM: K58.1  ICD-9-CM: 564.1  5/25/2022 - Present        Chest pain ICD-10-CM: R07.9  ICD-9-CM: 786.50  5/6/2022 - Present        Atypical chest pain ICD-10-CM: R07.89  ICD-9-CM: 786.59  8/26/2020 - Present        Essential hypertension ICD-10-CM: I10  ICD-9-CM: 401.9  8/26/2020 - Present        Hypertensive heart disease ICD-10-CM: I11.9  ICD-9-CM: 402.90  8/26/2020 - Present        Constipation ICD-10-CM: K59.00  ICD-9-CM: 564.00  8/26/2020 - Present        Contact dermatitis ICD-10-CM: L25.9  ICD-9-CM: 692.9  8/26/2020 - Present        Coronary arteriosclerosis in native artery ICD-10-CM: I25.10  ICD-9-CM: 414.01  8/26/2020 - Present        Disorder of esophagus ICD-10-CM: K22.9  ICD-9-CM: 530.9  8/26/2020 - Present        Edema ICD-10-CM: R60.9  ICD-9-CM: 782.3  8/26/2020 - Present        Enthesopathy of knee ICD-10-CM: M76.899  ICD-9-CM: 726.60  8/26/2020 - Present        GERD (gastroesophageal reflux disease) ICD-10-CM: K21.9  ICD-9-CM: 530.81  8/26/2020 - Present        Hypercholesterolemia ICD-10-CM: E78.00  ICD-9-CM: 272.0  8/26/2020 - Present        Obesity ICD-10-CM: E66.9  ICD-9-CM: 278.00  8/26/2020 - Present        Tinea barbae ICD-10-CM: B35.0  ICD-9-CM: 110.0  8/26/2020 - Present        Vitamin D deficiency ICD-10-CM: E55.9  ICD-9-CM: 268.9  8/26/2020 - Present       Medications reviewed  Current Facility-Administered Medications   Medication Dose Route Frequency    clopidogreL (PLAVIX) tablet 75 mg  75 mg Oral DAILY    furosemide (LASIX) tablet 40 mg  40 mg Oral ACB&D    levoFLOXacin (LEVAQUIN) 750 mg in D5W IVPB  750 mg IntraVENous Q24H    aspirin delayed-release tablet 81 mg  81 mg Oral DAILY    atorvastatin (LIPITOR) tablet 80 mg  80 mg Oral DAILY    carvediloL (COREG) tablet 25 mg  25 mg Oral BID    chlorthalidone (HYGROTON) tablet 25 mg  25 mg Oral DAILY    cyanocobalamin tablet 1,000 mcg  1,000 mcg Oral DAILY    folic acid (FOLVITE) tablet 1 mg  1 mg Oral DAILY    pantoprazole (PROTONIX) tablet 40 mg  40 mg Oral DAILY    potassium chloride SR (KLOR-CON 10) tablet 10 mEq  10 mEq Oral BID    sodium chloride (NS) flush 5-40 mL  5-40 mL IntraVENous Q8H    sodium chloride (NS) flush 5-40 mL  5-40 mL IntraVENous PRN    acetaminophen (TYLENOL) tablet 650 mg  650 mg Oral Q6H PRN    Or    acetaminophen (TYLENOL) suppository 650 mg  650 mg Rectal Q6H PRN    polyethylene glycol (MIRALAX) packet 17 g  17 g Oral DAILY PRN    ondansetron (ZOFRAN ODT) tablet 4 mg  4 mg Oral Q8H PRN    Or    ondansetron (ZOFRAN) injection 4 mg  4 mg IntraVENous Q6H PRN    enoxaparin (LOVENOX) injection 30 mg  30 mg SubCUTAneous Q12H       Review of Systems:   A comprehensive review of systems was negative except for that written in the HPI. Objective:   Physical Exam:     Visit Vitals  BP (!) 141/93 (BP Patient Position: At rest;Lying)   Pulse 68   Temp 97.9 °F (36.6 °C)   Resp 20   Wt 142.5 kg (314 lb 1.6 oz)   SpO2 97%   BMI 42.60 kg/m²      O2 Device: None (Room air)    Temp (24hrs), Av.8 °F (36.6 °C), Min:97.5 °F (36.4 °C), Max:98.2 °F (36.8 °C)    No intake/output data recorded.  1901 -  0700  In: 400 [P.O.:400]  Out: -     General:   Awake and alert   Lungs:   Clear to auscultation bilaterally. Chest wall:  No tenderness or deformity. Heart:  Regular rate and rhythm, S1, S2 normal, no murmur, click, rub or gallop. Abdomen:   Soft, non-tender. Bowel sounds normal. No masses,  No organomegaly. Extremities: Extremities normal, atraumatic, no cyanosis or edema. Pulses: 2+ and symmetric all extremities. Skin: Skin color, texture, turgor normal. No rashes or lesions   Neurologic: CNII-XII intact.   No gross focal deficits         Data Review:       Recent Days:  Recent Labs     23  0540   WBC 12.2*   HGB 13.8   HCT 46.1 *       Recent Labs     01/05/23  0540      K 4.4      CO2 27   *   BUN 23*   CREA 1.06   CA 9.6   MG 2.4       No results for input(s): PH, PCO2, PO2, HCO3, FIO2 in the last 72 hours. 24 Hour Results:  Recent Results (from the past 24 hour(s))   ECHO ADULT FOLLOW-UP OR LIMITED    Collection Time: 01/06/23  4:45 PM   Result Value Ref Range    LV EDV A2C 51 mL    LV EDV A4C 112 mL    LV ESV A2C 23 mL    LV ESV A4C 42 mL    LVOT Diameter 2.5 cm    LVOT Mean Gradient 1 mmHg    LVOT VTI 15.3 cm    LVOT Peak Velocity 0.7 m/s    LVOT Peak Gradient 2 mmHg    LV Ejection Fraction A2C 55 %    LV Ejection Fraction A4C 63 %    EF BP 61 55 - 100 %    LVOT Area 4.9 cm2    LVOT SV 75.1 ml    LA Minor Axis 5.8 cm    LA Major Axis 5.4 cm    LA Area 2C 22.9 cm2    LA Area 4C 19.1 cm2    LA Volume BP 66 (A) 18 - 58 mL    AV Mean Gradient 5 mmHg    AV VTI 27.0 cm    AV Mean Velocity 1.1 m/s    AV Peak Velocity 1.5 m/s    AV Peak Gradient 8 mmHg    AV Area by VTI 2.8 cm2    AV Area by Peak Velocity 2.4 cm2    Aortic Root 3.2 cm    AV Velocity Ratio 0.47     LVOT:AV VTI Index 0.57        CT CHEST WO CONT   Final Result   1. Multifocal ground glass opacities in the lower lobes, favor pneumonia. 2.  Trace pleural effusions bilaterally. XR CHEST PORT   Final Result   Mild diffuse interstitial and airspace opacities could be edema and/or   pneumonia. Assessment:  Acute flash pulmonary edema secondary to congestive heart failure vs hypertensive flash pulmonary edema   -Consider also atypical pneumonia     -Rule out acute coronary syndrome precipitating the above   -Rapid COVID is negative     Coronary artery disease with history of PCI     Essential hypertension     Hyperlipidemia     Obesity      Discussion/MDM:  Giuliana Bailey is a 60 y.o. male with a past medical history of HTN, HLD, obesity who presents with acute shortness of breath onset 0230 today.  Ems was called and the patient was noted to be hypoxic and tachypneic in the field with an O2 saturation in the 80s. He was started on BiPAP in the ED given his work of breathing. Patients ED work-up was notable for mild leukocytosis, hyperglycemia, mildly elevated high-sensitivity troponin and elevated NT pro-BNP (400). CXR showed mild diffuse interstitial and airspace opacities. EKG showed sinus tachycardia with frequent PVCs. Given the patients progressive history and work-up differential diagnoses include congestive heart failure, hypertensive flash pulmonary edema, PE, type II myocardial injury, MI, pneumonia, COPD exacerbation/asthma exacerbation at this time. PE was considered given the patients hypoxia and tachycardia, however given the patient's progressive nature of his symptoms, CHF is higher on the differential. If Echo does not show signs of CHF, will consider CT of the chest vs cardiac cath. Plan:  Awaiting echocardiogram report    Will speak with cardiology prior to discharge. It sounds like he will require repeat cardiac catheterization although this can probably be done in the outpatient setting        Care Plan discussed with: Patient/Family    Code status: Full    Social determinants of health: No barrier appreciated in discussion with patient and family    Disposition: Pending  Echo, potential discharge in 24 hours    Total time spent with patient: 30 minutes.     Tao Guzmán MD

## 2023-01-07 NOTE — PROGRESS NOTES
Hospitalist Progress Note               Daily Progress Note: 1/7/2023      Subjective:   Hospital course to date:  Tamir Saldaña is a 60 y.o. male with a past medical history of HTN, HLD, obesity who presents with acute shortness of breath onset 0230 today. Ems was called and the patient was noted to be hypoxic in the field with an O2 saturation in the 80s. He was start on CPAP and transported to the ER. Where he was subsequently started on BiPAP given his work of breathing and rales on auscultation. The patient denies any history of CHF. He cannot recall the last time he had an Echocardiogram and he sees Dr. Rome Ruiz as his Cardiologist. He describes over the last several months he has had progressive dyspnea on exertion, where now he can barely walk around the house without becoming short of breath. He notes progressive orthopnea as well, as he can no longer sleep flat. He denies any leg swelling. In May of 2022 the patient had a drug-eluting stent placed in the mid-LAD. He notes that approximately 2 weeks ago he had upper respiratory infection symptoms of cough, runny nose and sneezing. Overnight, the patient's troponin continued to uptrend to a peak of 259. Procalcitonin was elevated at 0.13. Cardiology elected for the patient to undergo a cardiac cath.     -------  While inpatient, Mr. Piotr Sweet describes that he has had approximately one month of exertional chest pain with exertion and that over this time period the chest pain has been worsening. He denies any chest pain currently and continues to report improvement in his symptoms since his admission. I have reviewed patient's presenting subjective and objective findings, as well as all laboratory studies, imaging studies, and vital signs to date as well as treatment rendered and patient's response to those treatments. In addition, prior medical, surgical and relevant social and family histories were reviewed.         Problem List:  Problem List as of 1/7/2023 Date Reviewed: 3/31/2021            Codes Class Noted - Resolved    Acute CHF ([facility] ICD-10-CM: I50.9  ICD-9-CM: 428.0  1/5/2023 - Present        Mixed hyperlipidemia ICD-10-CM: E78.2  ICD-9-CM: 272.2  9/7/2022 - Present        Irritable bowel syndrome with constipation ICD-10-CM: K58.1  ICD-9-CM: 564.1  5/25/2022 - Present        Chest pain ICD-10-CM: R07.9  ICD-9-CM: 786.50  5/6/2022 - Present        Atypical chest pain ICD-10-CM: R07.89  ICD-9-CM: 786.59  8/26/2020 - Present        Essential hypertension ICD-10-CM: I10  ICD-9-CM: 401.9  8/26/2020 - Present        Hypertensive heart disease ICD-10-CM: I11.9  ICD-9-CM: 402.90  8/26/2020 - Present        Constipation ICD-10-CM: K59.00  ICD-9-CM: 564.00  8/26/2020 - Present        Contact dermatitis ICD-10-CM: L25.9  ICD-9-CM: 692.9  8/26/2020 - Present        Coronary arteriosclerosis in native artery ICD-10-CM: I25.10  ICD-9-CM: 414.01  8/26/2020 - Present        Disorder of esophagus ICD-10-CM: K22.9  ICD-9-CM: 530.9  8/26/2020 - Present        Edema ICD-10-CM: R60.9  ICD-9-CM: 782.3  8/26/2020 - Present        Enthesopathy of knee ICD-10-CM: M76.899  ICD-9-CM: 726.60  8/26/2020 - Present        GERD (gastroesophageal reflux disease) ICD-10-CM: K21.9  ICD-9-CM: 530.81  8/26/2020 - Present        Hypercholesterolemia ICD-10-CM: E78.00  ICD-9-CM: 272.0  8/26/2020 - Present        Obesity ICD-10-CM: E66.9  ICD-9-CM: 278.00  8/26/2020 - Present        Tinea barbae ICD-10-CM: B35.0  ICD-9-CM: 110.0  8/26/2020 - Present        Vitamin D deficiency ICD-10-CM: E55.9  ICD-9-CM: 268.9  8/26/2020 - Present       Medications reviewed  Current Facility-Administered Medications   Medication Dose Route Frequency    clopidogreL (PLAVIX) tablet 75 mg  75 mg Oral DAILY    furosemide (LASIX) tablet 40 mg  40 mg Oral ACB&D    levoFLOXacin (LEVAQUIN) 750 mg in D5W IVPB  750 mg IntraVENous Q24H    aspirin delayed-release tablet 81 mg  81 mg Oral DAILY    atorvastatin (LIPITOR) tablet 80 mg  80 mg Oral DAILY    carvediloL (COREG) tablet 25 mg  25 mg Oral BID    chlorthalidone (HYGROTON) tablet 25 mg  25 mg Oral DAILY    cyanocobalamin tablet 1,000 mcg  1,000 mcg Oral DAILY    folic acid (FOLVITE) tablet 1 mg  1 mg Oral DAILY    pantoprazole (PROTONIX) tablet 40 mg  40 mg Oral DAILY    potassium chloride SR (KLOR-CON 10) tablet 10 mEq  10 mEq Oral BID    sodium chloride (NS) flush 5-40 mL  5-40 mL IntraVENous Q8H    sodium chloride (NS) flush 5-40 mL  5-40 mL IntraVENous PRN    acetaminophen (TYLENOL) tablet 650 mg  650 mg Oral Q6H PRN    Or    acetaminophen (TYLENOL) suppository 650 mg  650 mg Rectal Q6H PRN    polyethylene glycol (MIRALAX) packet 17 g  17 g Oral DAILY PRN    ondansetron (ZOFRAN ODT) tablet 4 mg  4 mg Oral Q8H PRN    Or    ondansetron (ZOFRAN) injection 4 mg  4 mg IntraVENous Q6H PRN    enoxaparin (LOVENOX) injection 30 mg  30 mg SubCUTAneous Q12H       Review of Systems:   A comprehensive review of systems was negative except for that written in the HPI. Objective:   Physical Exam:     Visit Vitals  BP (!) 141/93 (BP Patient Position: At rest;Lying)   Pulse 68   Temp 97.9 °F (36.6 °C)   Resp 20   Wt 314 lb 1.6 oz (142.5 kg)   SpO2 97%   BMI 42.60 kg/m²      O2 Device: None (Room air)    Temp (24hrs), Av.8 °F (36.6 °C), Min:97.5 °F (36.4 °C), Max:98.2 °F (36.8 °C)    No intake/output data recorded.  1901 -  0700  In: 400 [P.O.:400]  Out: -     General:   Awake and alert   Lungs:   Clear to auscultation bilaterally. Chest wall:  No tenderness or deformity. Heart:  Regular rate and rhythm, S1, S2 normal, no murmur, click, rub or gallop. Abdomen:   Soft, non-tender. Bowel sounds normal. No masses,  No organomegaly. Extremities: Extremities normal, atraumatic, no cyanosis or edema. Pulses: 2+ and symmetric all extremities. Skin: Skin color, texture, turgor normal. No rashes or lesions   Neurologic: CNII-XII intact.   No gross focal deficits. Data Review:       Recent Days:  Recent Labs     01/05/23  0540   WBC 12.2*   HGB 13.8   HCT 46.1   *       Recent Labs     01/05/23  0540      K 4.4      CO2 27   *   BUN 23*   CREA 1.06   CA 9.6   MG 2.4       No results for input(s): PH, PCO2, PO2, HCO3, FIO2 in the last 72 hours. 24 Hour Results:  Recent Results (from the past 24 hour(s))   ECHO ADULT FOLLOW-UP OR LIMITED    Collection Time: 01/06/23  4:45 PM   Result Value Ref Range    LV EDV A2C 51 mL    LV EDV A4C 112 mL    LV ESV A2C 23 mL    LV ESV A4C 42 mL    LVOT Diameter 2.5 cm    LVOT Mean Gradient 1 mmHg    LVOT VTI 15.3 cm    LVOT Peak Velocity 0.7 m/s    LVOT Peak Gradient 2 mmHg    LV Ejection Fraction A2C 55 %    LV Ejection Fraction A4C 63 %    EF BP 61 55 - 100 %    LVOT Area 4.9 cm2    LVOT SV 75.1 ml    LA Minor Axis 5.8 cm    LA Major Axis 5.4 cm    LA Area 2C 22.9 cm2    LA Area 4C 19.1 cm2    LA Volume BP 66 (A) 18 - 58 mL    AV Mean Gradient 5 mmHg    AV VTI 27.0 cm    AV Mean Velocity 1.1 m/s    AV Peak Velocity 1.5 m/s    AV Peak Gradient 8 mmHg    AV Area by VTI 2.8 cm2    AV Area by Peak Velocity 2.4 cm2    Aortic Root 3.2 cm    AV Velocity Ratio 0.47     LVOT:AV VTI Index 0.57        CT CHEST WO CONT   Final Result   1. Multifocal ground glass opacities in the lower lobes, favor pneumonia. 2.  Trace pleural effusions bilaterally. XR CHEST PORT   Final Result   Mild diffuse interstitial and airspace opacities could be edema and/or   pneumonia.            Assessment:  Acute flash pulmonary edema secondary to congestive heart failure vs hypertensive flash pulmonary edema   -Consider also atypical pneumonia     -Rule out acute coronary syndrome precipitating the above   -Rapid COVID is negative    Acute respiratory failure with hypoxia     Coronary artery disease with history of PCI     Essential hypertension     Hyperlipidemia     Obesity      Discussion/MDM:  Deneen Malloy is a 60 y.o. male with a past medical history of HTN, HLD, obesity who presents with acute shortness of breath onset 0230 today. Ems was called and the patient was noted to be hypoxic and tachypneic in the field with an O2 saturation in the 80s. He was started on BiPAP in the ED given his work of breathing. Patients ED work-up was notable for mild leukocytosis, hyperglycemia, mildly elevated high-sensitivity troponin and elevated NT pro-BNP (400). CXR showed mild diffuse interstitial and airspace opacities. EKG showed sinus tachycardia with frequent PVCs. Given the patients progressive history and work-up differential diagnoses include congestive heart failure, hypertensive flash pulmonary edema, PE, type II myocardial injury, MI, pneumonia, COPD exacerbation/asthma exacerbation at this time. PE was considered given the patients hypoxia and tachycardia, however given the patient's progressive nature of his symptoms, CHF is higher on the differential. If Echo does not show signs of CHF, will consider CT of the chest vs cardiac cath. Plan:  - Echo pending  - Given his now described exertional chest pain, will run this past Cardiology once again  - On PO 40mg Lasix BID  - Strict Is and Os for close monitoring of patient's response to diuretic      Care Plan discussed with: Patient/Family    Code status: Full    Social determinants of health: No barrier appreciated in discussion with patient and family    Disposition: Pending  Echo, potential discharge in 24 hours    Total time spent with patient: 30 minutes.     Lucian April

## 2023-01-07 NOTE — MED STUDENT NOTES
Physician Discharge Summary     Patient ID:    Vince Rowell  [id number]  60 y.o.  1960    Admit date: 1/5/2023    Discharge date : 1/7/2023      Final Diagnoses:   Acute CHF ([address] [I50.9]  Acute flash pulmonary edema secondary to congestive heart failure vs hypertensive flash pulmonary edema     Acute respiratory failure with hypoxia     Coronary artery disease with history of PCI     Essential hypertension     Hyperlipidemia     Obesity    Reason for Hospitalization:  Shortness of breath requiring non-invasive ventilator support      Hospital Course:   Vince Rowell is a 60 y.o. male with a past medical history of HTN, HLD, obesity who presents with acute shortness of breath onset 0230 today. Ems was called and the patient was noted to be hypoxic in the field with an O2 saturation in the 80s. He was start on CPAP and transported to the ER. Where he was subsequently started on BiPAP given his work of breathing and rales on auscultation. The patient denies any history of CHF. In May of 2022 the patient had a drug-eluting stent placed in the mid-LAD. He notes that approximately 2 weeks ago he had upper respiratory infection symptoms of cough, runny nose and sneezing. Overnight, the patient's troponin continued to uptrend to a peak of 259. Procalcitonin was elevated at 0.13. Cardiology elected for the patient to undergo a cardiac cath. While inpatient, Mr. Jonah Apple describes that he has had approximately one month of exertional chest pain with exertion and that over this time period the chest pain has been worsening. He denies any chest pain currently and continues to report improvement in his symptoms since his admission. Patient had Echocardiogram inpatient demonstrated an EF of 50-55% with inferior wall hypokinesis. Patient will be discharged home and follow up with his cardiologist for outpatient catheterization.               Discharge Medications:   Current Discharge Medication List            Follow up Care:    1. Nicolas Clifford MD in 1-2 weeks. Please call to set up an appointment shortly after discharge. 2. Follow up with Jami Donahue MD in 1-2 weeks. Please call to set up an appointment shortly after discharge. Diet:  Cardiac Diet    Disposition:  Home. Advanced Directive:   FULL X   DNR      Discharge Exam:  General:  Alert, cooperative, no distress, appears stated age. Lungs:   Clear to auscultation bilaterally. Chest wall:  No tenderness or deformity. Heart:  Regular rate and rhythm, S1, S2 normal, no murmur, click, rub or gallop. Abdomen:   Soft, non-tender. Bowel sounds normal. No masses,  No organomegaly. Extremities: Extremities normal, atraumatic, no cyanosis or edema. Pulses: 2+ and symmetric all extremities. Skin: Skin color, texture, turgor normal. No rashes or lesions   Neurologic: CNII-XII intact. No gross sensory or motor deficits        CONSULTATIONS: Cardiology    Significant Diagnostic Studies:   1/5/2023: BUN 23 mg/dL (H; Ref range: 6 - 20 mg/dL); Calcium 9.6 mg/dL (Ref range: 8.5 - 10.1 mg/dL); CO2 27 mmol/L (Ref range: 21 - 32 mmol/L); Creatinine 1.06 mg/dL (Ref range: 0.70 - 1.30 mg/dL); Glucose 225 mg/dL (H; Ref range: 65 - 100 mg/dL); HCT 46.1 % (Ref range: 36.6 - 50.3 %); HGB 13.8 g/dL (Ref range: 12.1 - 17.0 g/dL); Potassium 4.4 mmol/L (Ref range: 3.5 - 5.1 mmol/L); Sodium 140 mmol/L (Ref range: 136 - 145 mmol/L)  Recent Labs     01/05/23  0540   WBC 12.2*   HGB 13.8   HCT 46.1   *     Recent Labs     01/05/23  0540      K 4.4      CO2 27   BUN 23*   CREA 1.06   *   CA 9.6   MG 2.4     No results for input(s): ALT, AP, TBIL, TBILI, TP, ALB, GLOB, GGT, AML, LPSE in the last 72 hours. No lab exists for component: SGOT, GPT, AMYP, HLPSE  No results for input(s): INR, PTP, APTT, INREXT in the last 72 hours. No results for input(s): FE, TIBC, PSAT, FERR in the last 72 hours.    No results for input(s): PH, PCO2, PO2 in the last 72 hours. No results for input(s): CPK, CKMB in the last 72 hours. No lab exists for component: TROPONINI  Lab Results   Component Value Date/Time    Glucose (POC) 133 (H) 05/06/2022 06:47 AM       Discharge time spent 35 minutes    Signed:  Lex Rose  1/7/2023  12:15 PM   *ATTENTION:  This note has been created by a medical student for educational purposes only. Please do not refer to the content of this note for clinical decision-making, billing, or other purposes. Please see attending physicians note to obtain clinical information on this patient. *

## 2023-01-07 NOTE — PROGRESS NOTES
[address] January 7, 2023       RE: Boyd Murillo      To Whom It May Concern,    This is to certify that Boyd Murillo was hospitalized from 1/5 through 1/7/2023. He is excuse from work those days through January 10 and may return to work on January 11      Please feel free to contact my office if you have any questions or concerns. Thank you for your assistance in this matter.       Sincerely,  Niyah Craft MD

## 2023-01-07 NOTE — PROGRESS NOTES
Patient with discharge orders home today, self care. Discharge plan of care/case management plan validated with provider discharge order.

## 2023-01-07 NOTE — MED STUDENT NOTES
Physician Discharge Summary     Patient ID:    Xin Skelton  [id number]  60 y.o.  1960    Admit date: 1/5/2023    Discharge date : 1/7/2023      Final Diagnoses:   Abnormal atypical community-acquired pneumonia   -Cannot rule out component of acute CHF although proBNP was only 905 and LV systolic function is normal     Acute respiratory failure with hypoxia     Coronary artery disease with history of PCI     Essential hypertension     Hyperlipidemia     Obesity    Reason for Hospitalization:  Shortness of breath requiring non-invasive ventilator support      Hospital Course:   Xin Skelton is a 60 y.o. male with a past medical history of HTN, HLD, obesity who presents with acute shortness of breath onset 0230 today. Ems was called and the patient was noted to be hypoxic in the field with an O2 saturation in the 80s. He was start on CPAP and transported to the ER. Where he was subsequently started on BiPAP given his work of breathing and rales on auscultation. The patient denies any history of CHF. In May of 2022 the patient had a drug-eluting stent placed in the mid-LAD. He notes that approximately 2 weeks ago he had upper respiratory infection symptoms of cough, runny nose and sneezing. Overnight, the patient's troponin continued to uptrend to a peak of 259. Procalcitonin was elevated at 0.13. Cardiology elected for the patient to undergo a cardiac cath. While inpatient, Mr. Heather Alvarado describes that he has had approximately one month of exertional chest pain with exertion and that over this time period the chest pain has been worsening. He denies any chest pain currently and continues to report improvement in his symptoms since his admission. Patient had Echocardiogram inpatient demonstrated an EF of 50-55% with inferior wall hypokinesis. Patient will be discharged home and follow up with his cardiologist for outpatient catheterization.               Discharge Medications:   Current Discharge Medication List        START taking these medications    Details   furosemide (LASIX) 40 mg tablet 1 tablet daily  Qty: 30 Tablet, Refills: 0  Start date: 1/7/2023      levoFLOXacin (Levaquin) 750 mg tablet Take 1 Tablet by mouth daily. Qty: 5 Tablet, Refills: 0  Start date: 1/7/2023      clopidogreL (PLAVIX) 75 mg tab Take 1 Tablet by mouth daily. Qty: 30 Tablet, Refills: 0  Start date: 1/8/2023           CONTINUE these medications which have NOT CHANGED    Details   chlorthalidone (HYGROTON) 25 mg tablet TAKE 1 TABLET BY MOUTH EVERY DAY  Qty: 30 Tablet, Refills: 1      atorvastatin (LIPITOR) 80 mg tablet TAKE 1 TABLET BY MOUTH EVERY DAY  Qty: 90 Tablet, Refills: 0      potassium chloride SA (MICRO-K) 10 mEq capsule Take 1 Capsule by mouth daily for 180 days. Qty: 90 Capsule, Refills: 1    Associated Diagnoses: Hypokalemia      pantoprazole (PROTONIX) 40 mg tablet Take 1 Tablet by mouth daily. Qty: 90 Tablet, Refills: 1      folic acid (FOLVITE) 1 mg tablet Take 1 mg by mouth daily. carvediloL (COREG) 25 mg tablet Take 25 mg by mouth two (2) times a day. aspirin delayed-release 81 mg tablet Take 1 Tablet by mouth daily. cyanocobalamin 1,000 mcg tablet Take 1,000 mcg by mouth daily. linaCLOtide (Linzess) 290 mcg cap capsule TAKE 1 CAPSULE BY MOUTH EVERY DAY AS NEEDED  Qty: 30 Capsule, Refills: 2    Associated Diagnoses: Constipation, unspecified      lubiPROStone (AMITIZA) 24 mcg capsule TAKE 1 CAPSULE BY MOUTH TWICE DAILY WITH MEALS  Qty: 180 Capsule, Refills: 1               Follow up Care:    1. Myles Armstrong MD in 1-2 weeks. Please call to set up an appointment shortly after discharge. 2. Follow up with Bessie Zaidi MD in 1-2 weeks. Please call to set up an appointment shortly after discharge. Diet:  Cardiac Diet    Disposition:  Home.     Advanced Directive:   FULL X   DNR      Discharge Exam:  General:  Alert, cooperative, no distress, appears stated age. Lungs:   Clear to auscultation bilaterally. Chest wall:  No tenderness or deformity. Heart:  Regular rate and rhythm, S1, S2 normal, no murmur, click, rub or gallop. Abdomen:   Soft, non-tender. Bowel sounds normal. No masses,  No organomegaly. Extremities: Extremities normal, atraumatic, no cyanosis or edema. Pulses: 2+ and symmetric all extremities. Skin: Skin color, texture, turgor normal. No rashes or lesions   Neurologic: CNII-XII intact. No gross sensory or motor deficits        CONSULTATIONS: Cardiology    Significant Diagnostic Studies:   1/5/2023: BUN 23 mg/dL (H; Ref range: 6 - 20 mg/dL); Calcium 9.6 mg/dL (Ref range: 8.5 - 10.1 mg/dL); CO2 27 mmol/L (Ref range: 21 - 32 mmol/L); Creatinine 1.06 mg/dL (Ref range: 0.70 - 1.30 mg/dL); Glucose 225 mg/dL (H; Ref range: 65 - 100 mg/dL); HCT 46.1 % (Ref range: 36.6 - 50.3 %); HGB 13.8 g/dL (Ref range: 12.1 - 17.0 g/dL); Potassium 4.4 mmol/L (Ref range: 3.5 - 5.1 mmol/L); Sodium 140 mmol/L (Ref range: 136 - 145 mmol/L)  Recent Labs     01/05/23  0540   WBC 12.2*   HGB 13.8   HCT 46.1   *       Recent Labs     01/05/23  0540      K 4.4      CO2 27   BUN 23*   CREA 1.06   *   CA 9.6   MG 2.4       No results for input(s): ALT, AP, TBIL, TBILI, TP, ALB, GLOB, GGT, AML, LPSE in the last 72 hours. No lab exists for component: SGOT, GPT, AMYP, HLPSE  No results for input(s): INR, PTP, APTT, INREXT, INREXT in the last 72 hours. No results for input(s): FE, TIBC, PSAT, FERR in the last 72 hours. No results for input(s): PH, PCO2, PO2 in the last 72 hours. No results for input(s): CPK, CKMB in the last 72 hours.     No lab exists for component: TROPONINI  Lab Results   Component Value Date/Time    Glucose (POC) 133 (H) 05/06/2022 06:47 AM       Discharge time spent 35 minutes    Signed:  Ashley Treadwell MD  1/7/2023  12:15 PM

## 2023-01-09 ENCOUNTER — TELEPHONE (OUTPATIENT)
Dept: FAMILY MEDICINE CLINIC | Age: 63
End: 2023-01-09

## 2023-01-09 ENCOUNTER — PATIENT OUTREACH (OUTPATIENT)
Dept: CASE MANAGEMENT | Age: 63
End: 2023-01-09

## 2023-01-09 NOTE — PROGRESS NOTES
Care Transitions Initial Call    Call within 2 business days of discharge: Yes     Patient: Dariana Bertrand Patient : 1960 MRN: [id number]    Last Discharge [address]       Date Complaint Diagnosis Description Type Department Provider    23 Respiratory Distress Acute pulmonary edema ([facility] [address] . .. ED to Hosp-Admission (Discharged) (ADMIT) Renetta Obando MD; Joseph Ricks . .. Was this an external facility discharge? No     Challenges to be reviewed by the provider   Additional needs identified to be addressed with provider:   O/P catheterization  Plans to follow up with cardiology  /73 74  Denies sob, cp       Method of communication with provider : none    Discussed COVID-19 related testing which was available at this time. Test results were negative. Patient informed of results, if available? yes     Advance Care Planning:   Does patient have an Advance Directive: not on file; education provided, referral to internal ACP facilitator. Inpatient Readmission Risk score: Unplanned Readmit Risk Score: 9.1    Was this a readmission? no   Patient stated reason for the admission: sob    Patients top risk factors for readmission: level of motivation, medical condition-chf, and medication management   Interventions to address risk factors: Scheduled appointment with PCP-, Scheduled appointment with Specialist-will arrange fu appt with cards, Obtained and reviewed discharge summary and/or continuity of care documents, and Education of patient/family/caregiver/guardian to support self-management-daily weights, monitor BP    Care Transition Nurse (CTN) contacted the  spouse, Bianca Leslie  by telephone to perform post hospital discharge assessment. Verified name and  with family as identifiers. Provided introduction to self, and explanation of the CTN role. (Speaker phone activated and spoke to the patient). Reports feeling well, but endorses \"moving slowly\".  Denies chest pain, sob, fever, chills, edema. Productive cough. Reports R arm soreness. Reviewed completing abx, daily weights when to call MD for weight gain, and low sodium diet. Spouse reports her understanding with reading nutritional labels. Current weight 292# down 6 pounds from yesterday. CTN reviewed discharge instructions, medical action plan and red flags with family who verbalized understanding. Were discharge instructions available to patient? yes. Reviewed appropriate site of care based on symptoms and resources available to patient including: PCP, Specialist, and [employer]. Family given an opportunity to ask questions and does not have any further questions or concerns at this time. The family agrees to contact the PCP office for questions related to their healthcare. Medication reconciliation was performed with family, who verbalizes understanding of administration of home medications. Referral to Pharm D needed: no     Home Health/Outpatient orders at discharge: none    Durable Medical Equipment ordered at discharge: None    Was patient discharged with a pulse oximeter? no    Discussed follow-up appointments. If no appointment was previously scheduled, appointment scheduling offered:  appt scheduled . Is follow up appointment scheduled within 7 days of discharge? yes. [facility] follow up appointment(s):   Future Appointments   Date Time Provider Jorge Gibbs   2/2/2023  8:30 AM Osmani Rudolph MD [facility]     Non-[facility] follow up appointment(s): 1/12 Dr. Aleisha Díaz at [phone]. Plan for follow-up call in 5-7 days based on severity of symptoms and risk factors. Plan for next call: self management-daily weights, bp, follow up appointment-pcp, cards, and medication management-complete abx  CTN provided contact information for future needs. Goals Addressed                   This Visit's Progress     Prevent complications post hospitalization.           01/09/23  Absence of falls  Will complete abx therapy  Will perform daily weights with tracking  Will notified JEVON DUARTE of weight gain of 2-3 # in one day or 5 # in a week  Will perform home BP monitoring  Will monitor sodium intake  Will attend fu appt with PCP scheduled 1/12  Will arrange fu appt with Dr. Racquel Melton, cards  Pt will remain out of the hospital or ER for remainder of LAURA period

## 2023-01-09 NOTE — TELEPHONE ENCOUNTER
Care Transitions Initial Follow Up Call    Outreach made within 2 business days of discharge: Yes    Patient: James Hernandez Patient : 1960   MRN: [id number]  Reason for Admission: 2023  Discharge Date: 23       Spoke with: Lupe Myers     Discharge department/facility: [address] [place] [address] was notified to arrive by 1 pm to be worked in by provider. *     Scheduled appointment with PCP within 7-14 days    Follow Up  Future Appointments   Date Time Provider Jorge Gibbs   2023  8:30 AM Nemesio Gordon MD [facility] BS CHADWICK Myers

## 2023-01-16 ENCOUNTER — PATIENT OUTREACH (OUTPATIENT)
Dept: CASE MANAGEMENT | Age: 63
End: 2023-01-16

## 2023-01-16 NOTE — ACP (ADVANCE CARE PLANNING)
Initial outbound call made to patient who states he is not interested in completing at this time. No further outreach scheduled.

## 2023-01-17 ENCOUNTER — PATIENT OUTREACH (OUTPATIENT)
Dept: CASE MANAGEMENT | Age: 63
End: 2023-01-17

## 2023-01-17 NOTE — PROGRESS NOTES
Care Transitions Follow Up Call    Care Transition Nurse (CTN) contacted the patient by telephone to follow up after admission on 1/5/2023. Addressed changes since last contact:  denies cp, endorses R sided numbness that last approx 20-30 seconds 2-3x per day  Follow up appointment completed? no.   Was follow up appointment scheduled within 7 days of discharge? no.     CTN reviewed medical action plan and red flags with patient and discussed any barriers to care and/or understanding of plan of care after discharge. Discussed appropriate site of care based on symptoms and resources available to patient including: PCP and When to call 911. The patient agrees to contact the PCP office for questions related to their healthcare. Patients top risk factors for readmission: medical condition-chf    Interventions to address risk factors: Scheduled appointment with PCP-2/2    [facility] follow up appointment(s):   Future Appointments   Date Time Provider Jorge Gibbs   2/2/2023  8:30 AM MD Kari Sexton [address] BS AMB     Non-BS follow up appointment(s): NA    CTN provided contact information for future needs. Plan for follow-up call in 7-10 days based on severity of symptoms and risk factors. Plan for next call: self management-numbness       Goals Addressed                   This Visit's Progress     Prevent complications post hospitalization.    On track       01/09/23  Absence of falls  Will complete abx therapy  Will perform daily weights with tracking  Will notified JEVON DUARTE of weight gain of 2-3 # in one day or 5 # in a week  Will perform home BP monitoring  Will monitor sodium intake  Will attend fu appt with PCP scheduled 1/12  Will arrange fu appt with Dr. Светлана Barron, mary  Pt will remain out of the hospital or ER for remainder of LAURA period    01/17/23  No falls  Completed abx therapy  Current weight, 291.6#  Reports average BP 130s/60-70s  Monitoring sodium intake  Appt with PCP scheduled 2/2  Reviewed BEFAST acronym

## 2023-01-24 ENCOUNTER — PATIENT OUTREACH (OUTPATIENT)
Dept: CASE MANAGEMENT | Age: 63
End: 2023-01-24

## 2023-01-24 NOTE — PROGRESS NOTES
Care Transitions Follow Up Call    Challenges to be reviewed by the provider   Additional needs identified to be addressed with provider:   Reports marci calf pain since discharge, denies sob, swelling, redness  Denies injury  Recommended ER evaluation for worsening symptoms  Pt verbalized his understanding       Care Transition Nurse (CTN) contacted the patient by telephone to follow up after admission on 1/5/2023. Addressed changes since last contact:  marci calf pain    CTN reviewed medical action plan and red flags with patient and discussed any barriers to care and/or understanding of plan of care after discharge. Discussed appropriate site of care based on symptoms and resources available to patient including: PCP. The patient agrees to contact the PCP office for questions related to their healthcare. Patients top risk factors for readmission:  NA    Interventions to address risk factors:  [facility] follow up appointment(s):   Future Appointments   Date Time Provider Jorge Gibbs   2/2/2023  8:30 AM MD Kari Godoye [address] BS AMB     Non-BS follow up appointment(s): NA    CTN provided contact information for future needs. Plan for follow-up call in 7-10 days based on severity of symptoms and risk factors. Plan for next call: self management-calf pain       Goals Addressed                   This Visit's Progress     Prevent complications post hospitalization.    On track       01/09/23  Absence of falls  Will complete abx therapy  Will perform daily weights with tracking  Will notified JEVON DUARTE of weight gain of 2-3 # in one day or 5 # in a week  Will perform home BP monitoring  Will monitor sodium intake  Will attend fu appt with PCP scheduled 1/12  Will arrange fu appt with Dr. Carolynn Hernandez, mary  Pt will remain out of the hospital or ER for remainder of LAURA period    01/17/23  No falls  Completed abx therapy  Current weight, 291.6#  Reports average BP 130s/60-70s  Monitoring sodium intake  Appt with PCP scheduled 2/2  Reviewed WILMER acronym    01/24/23  Current weight 286#  Confirmed fu appt with pcp scheduled 2/2

## 2023-01-31 ENCOUNTER — PATIENT OUTREACH (OUTPATIENT)
Dept: CASE MANAGEMENT | Age: 63
End: 2023-01-31

## 2023-01-31 NOTE — PROGRESS NOTES
Care Transitions Follow Up Call    Challenges to be reviewed by the provider   Additional needs identified to be addressed with provider:   Encouraged the patient to go to ER to evaluate R calf pain and now reports R leg \"a little swollen\"  Dicussed possible dvt         Care Transition Nurse (CTN) contacted the patient by telephone to follow up after admission on 1/5/2023. Addressed changes since last contact:  R leg swollen a little    CTN reviewed medical action plan and red flags with patient and discussed any barriers to care and/or understanding of plan of care after discharge. Discussed appropriate site of care based on symptoms and resources available to patient including: PCP and Urgent Care Clinics. Patients top risk factors for readmission:  ?dvt    Interventions to address risk factors:  ER evaluation    [address] follow up appointment(s):   Future Appointments   Date Time Provider Jorge Gibbs   2/2/2023  8:30 AM MD Kari Stovall [facility] [address] [facility] AMB     Non-BS follow up appointment(s): NA    CTN provided contact information for future needs. Plan for follow-up call in 3-5 days based on severity of symptoms and risk factors. Plan for next call:  ER evaluation for possible dvt       Goals Addressed                   This Visit's Progress     Prevent complications post hospitalization.    On track       01/09/23  Absence of falls  Will complete abx therapy  Will perform daily weights with tracking  Will notified JEVON DUARTE of weight gain of 2-3 # in one day or 5 # in a week  Will perform home BP monitoring  Will monitor sodium intake  Will attend fu appt with PCP scheduled 1/12  Will arrange fu appt with Dr. Mercedes Nation, mary  Pt will remain out of the hospital or ER for remainder of LAURA period    01/17/23  No falls  Completed abx therapy  Current weight, 291.6#  Reports average BP 130s/60-70s  Monitoring sodium intake  Appt with PCP scheduled 2/2  Reviewed BEFAST acronym    01/24/23  Current weight 286#  Confirmed fu appt with pcp scheduled 2/2 01/31/23  Current weight 286#  Adhering to low sodium diet  Reports BP average 530-312A systolic  Will attend fu appt with pcp  Will report to ER for R leg edema

## 2023-02-02 ENCOUNTER — OFFICE VISIT (OUTPATIENT)
Dept: FAMILY MEDICINE CLINIC | Age: 63
End: 2023-02-02
Payer: COMMERCIAL

## 2023-02-02 VITALS
OXYGEN SATURATION: 98 % | HEART RATE: 67 BPM | SYSTOLIC BLOOD PRESSURE: 118 MMHG | TEMPERATURE: 98.9 F | WEIGHT: 286.6 LBS | HEIGHT: 73 IN | BODY MASS INDEX: 37.98 KG/M2 | DIASTOLIC BLOOD PRESSURE: 75 MMHG | RESPIRATION RATE: 16 BRPM

## 2023-02-02 DIAGNOSIS — E55.9 VITAMIN D DEFICIENCY: ICD-10-CM

## 2023-02-02 DIAGNOSIS — E66.01 SEVERE OBESITY (BMI 35.0-39.9) WITH COMORBIDITY (HCC): ICD-10-CM

## 2023-02-02 DIAGNOSIS — Z86.79 HISTORY OF CAROTID STENOSIS: ICD-10-CM

## 2023-02-02 DIAGNOSIS — I83.891 VARICOSE VEINS OF RIGHT LEG WITH EDEMA: ICD-10-CM

## 2023-02-02 DIAGNOSIS — K21.00 GASTROESOPHAGEAL REFLUX DISEASE WITH ESOPHAGITIS WITHOUT HEMORRHAGE: Primary | ICD-10-CM

## 2023-02-02 DIAGNOSIS — R20.0 RIGHT SIDED NUMBNESS: ICD-10-CM

## 2023-02-02 DIAGNOSIS — I11.0 HYPERTENSIVE HEART DISEASE WITH CONGESTIVE HEART FAILURE, UNSPECIFIED HEART FAILURE TYPE (HCC): ICD-10-CM

## 2023-02-02 DIAGNOSIS — K58.1 IRRITABLE BOWEL SYNDROME WITH CONSTIPATION: ICD-10-CM

## 2023-02-02 PROCEDURE — 3074F SYST BP LT 130 MM HG: CPT | Performed by: FAMILY MEDICINE

## 2023-02-02 PROCEDURE — 99214 OFFICE O/P EST MOD 30 MIN: CPT | Performed by: FAMILY MEDICINE

## 2023-02-02 PROCEDURE — 3078F DIAST BP <80 MM HG: CPT | Performed by: FAMILY MEDICINE

## 2023-02-02 RX ORDER — FUROSEMIDE 40 MG/1
TABLET ORAL
Qty: 30 TABLET | Refills: 2 | Status: SHIPPED | OUTPATIENT
Start: 2023-02-02

## 2023-02-02 RX ORDER — CHLORTHALIDONE 25 MG/1
25 TABLET ORAL DAILY
Qty: 30 TABLET | Refills: 2 | Status: SHIPPED | OUTPATIENT
Start: 2023-02-02

## 2023-02-02 NOTE — PROGRESS NOTES
1. \"Have you been to the ER, urgent care clinic since your last visit? Hospitalized since your last visit? \" Yes When: 1/5/23 Where: [facility] Reason for visit: couldn't breathe     2. \"Have you seen or consulted any other health care providers outside of the [address] [place] [address] since your last visit? \" No     3. For patients aged 39-70: Has the patient had a colonoscopy / FIT/ Cologuard? Yes - Care Gap present. Most recent result on file      If the patient is female:    4. For patients aged 41-77: Has the patient had a mammogram within the past 2 years? NA - based on age or sex      11. For patients aged 21-65: Has the patient had a pap smear? NA - based on age or sex     Chief Complaint   Patient presents with    Follow Up Chronic Condition     Visit Vitals  /75 (BP 1 Location: Right upper arm, BP Patient Position: Sitting, BP Cuff Size: Large adult)   Pulse 67   Temp 98.9 °F (37.2 °C) (Oral)   Resp 16   Ht 6' 1\" (1.854 m)   Wt 286 lb 9.6 oz (130 kg)   SpO2 98%   BMI 37.81 kg/m²     Pt is here for a follow up.

## 2023-02-08 ENCOUNTER — PATIENT OUTREACH (OUTPATIENT)
Dept: CASE MANAGEMENT | Age: 63
End: 2023-02-08

## 2023-02-08 NOTE — PROGRESS NOTES
Patient has graduated from the Transitions of Care Coordination  program on 2/8/2023. Patient/family has the ability to self-manage at this time Care management goals have been completed. Patient was not referred to the [facility] team for further management. Goals Addressed                   This Visit's Progress     COMPLETED: Prevent complications post hospitalization. 01/09/23  Absence of falls  Will complete abx therapy  Will perform daily weights with tracking  Will notified JEVON DUARTE of weight gain of 2-3 # in one day or 5 # in a week  Will perform home BP monitoring  Will monitor sodium intake  Will attend fu appt with PCP scheduled 1/12  Will arrange fu appt with Dr. Galeas Manual, cards  Pt will remain out of the hospital or ER for remainder of LAURA period    01/17/23  No falls  Completed abx therapy  Current weight, 291.6#  Reports average BP 130s/60-70s  Monitoring sodium intake  Appt with PCP scheduled 2/2  Reviewed BEFAST acronym    01/24/23  Current weight 286#  Confirmed fu appt with pcp scheduled 2/2 01/31/23  Current weight 286#  Adhering to low sodium diet  Reports BP average 812-458V systolic  Will attend fu appt with pcp  Will report to ER for R leg edema    02/08/23  Will attend fu appt with Vascular Surgery scheduled 2/20                Patient has Care Transition Nurse's contact information for any further questions, concerns, or needs.   Patients upcoming visits:    Future Appointments   Date Time Provider Jorge Gibbs   2/20/2023  3:45 PM MD DICK MaravillaP BS AMB   5/10/2023  4:30 PM Rusty Purcell MD [facility] BS AMB

## 2023-02-20 ENCOUNTER — OFFICE VISIT (OUTPATIENT)
Dept: SURGERY | Age: 63
End: 2023-02-20
Payer: COMMERCIAL

## 2023-02-20 VITALS
TEMPERATURE: 98.3 F | WEIGHT: 290.5 LBS | SYSTOLIC BLOOD PRESSURE: 129 MMHG | HEIGHT: 73 IN | DIASTOLIC BLOOD PRESSURE: 81 MMHG | OXYGEN SATURATION: 96 % | HEART RATE: 65 BPM | BODY MASS INDEX: 38.5 KG/M2 | RESPIRATION RATE: 16 BRPM

## 2023-02-20 DIAGNOSIS — I73.9 PERIPHERAL VASCULAR DISEASE (HCC): ICD-10-CM

## 2023-02-20 DIAGNOSIS — R20.0 RIGHT SIDED NUMBNESS: ICD-10-CM

## 2023-02-20 DIAGNOSIS — I83.891 VARICOSE VEINS OF RIGHT LEG WITH EDEMA: Primary | ICD-10-CM

## 2023-02-20 NOTE — PROGRESS NOTES
Identified pt with two pt identifiers (name and ). Reviewed chart in preparation for visit and have obtained necessary documentation. Jennifer Velásquez is a 60 y.o. 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 1. Have you been to the ER, urgent care clinic since your last visit? Hospitalized since your last visit? [facility] for Pneumonia    2. Have you seen or consulted any other health care providers outside of the [address] [place] [address] since your last visit? Include any pap smears or colon screening.  No

## 2023-02-20 NOTE — LETTER
2/25/2023    Patient: Itz Massey   YOB: 1960   Date of Visit: 2/20/2023     Jaron Garcia MD  [id number] [address]  [address] [facility] [address]    Dear Jaron Garcia MD,      Thank you for referring Mr. Марина Pino to [address] [place] [address] for evaluation. My notes for this consultation are attached. If you have questions, please do not hesitate to call me. I look forward to following your patient along with you.       Sincerely,    Lilly Monroy MD

## 2023-02-25 PROBLEM — I73.9 PERIPHERAL VASCULAR DISEASE (HCC): Status: ACTIVE | Noted: 2023-02-25

## 2023-02-25 NOTE — PROGRESS NOTES
Vascular History and Physical    Patient: Tiffanie Gage  MRN: [id number]    YOB: 1960  Age: 58 y.o. 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 very pleasant gentleman is here today for vascular assessment. Patient complaining of the bilateral leg pain numbness right side worse. Patient also recently hospitalized and found to have carotid artery stenosis. We do not have any images available. Patient currently denies any chest pain shortness of breath. Patient does complain of some swelling in the leg. Patient denies any neurological manifestation. Denies any amaurosis fugax, denies any speech problem denies any lateralizing symptoms of upper or lower extremity. Patient denies abdominal pain. Patient had apparently SHAHZAD and carotid duplex ultrasound done. Social History:  Social Connections:  Moderately Integrated    Frequency of Communication with Friends and Family: Twice a week    Frequency of Social Gatherings with Friends and Family: Twice a week    Attends [employer] Services: 1 to 4 times per year    Active Member of [employer] or Organizations: No    Attends Club or Organization Meetings: Never    Marital Status:        Past Medical History:  Past Medical History:   Diagnosis Date    Anemia     iron and blood transfusions    Atypical chest pain 8/26/2020    Constipation 8/26/2020    Contact dermatitis 8/26/2020    Coronary arteriosclerosis in native artery 8/26/2020    Disorder of esophagus 8/26/2020    Edema 8/26/2020    Enthesopathy of knee 8/26/2020    Essential hypertension 8/26/2020    GERD (gastroesophageal reflux disease) 8/26/2020    Hypercholesterolemia 8/26/2020    Hypertensive heart disease 8/26/2020    Ill-defined condition     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 30 Tablet 2    levoFLOXacin (Levaquin) 750 mg tablet Take 1 Tablet by mouth daily. 5 Tablet 0    clopidogreL (PLAVIX) 75 mg tab Take 1 Tablet by mouth daily. 30 Tablet 0    atorvastatin (LIPITOR) 80 mg tablet TAKE 1 TABLET BY MOUTH EVERY DAY 90 Tablet 0    pantoprazole (PROTONIX) 40 mg tablet Take 1 Tablet by mouth daily. 90 Tablet 1    folic acid (FOLVITE) 1 mg tablet Take 1 mg by mouth daily. carvediloL (COREG) 25 mg tablet Take 25 mg by mouth two (2) times a day. aspirin delayed-release 81 mg tablet Take 1 Tablet by mouth daily. cyanocobalamin 1,000 mcg tablet Take 1,000 mcg by mouth daily. linaCLOtide (Linzess) 290 mcg cap capsule TAKE 1 CAPSULE BY MOUTH EVERY DAY AS NEEDED (Patient taking differently: Take 290 mcg by mouth daily. TAKE 1 CAPSULE BY MOUTH EVERY DAY AS NEEDED) 30 Capsule 2    lubiPROStone (AMITIZA) 24 mcg capsule TAKE 1 CAPSULE BY MOUTH TWICE DAILY WITH MEALS 180 Capsule 1       Review of Systems:  I reviewed the rest of organ systems personally and they are negative. Pertinent findings discussed in HPI.     Physical Examination    Visit Vitals  /81 (BP 1 Location: Left upper arm, BP Patient Position: Sitting, BP Cuff Size: Large adult)   Pulse 65   Temp 98.3 °F (36.8 °C) (Temporal)   Resp 16   Ht 6' 1\" (1.854 m)   Wt 290 lb 8 oz (131.8 kg)   SpO2 96%   BMI 38.33 kg/m²     Gen: Well developed, well nourished 58 y.o. male in no acute distress  Head: normocephalic, atraumatic  Mouth: Clear, no overt lesions, oral mucosa pink and moist  Neck: supple, no masses, no adenopathy or carotid bruits, trachea midline  Resp: clear to auscultation bilaterally, no wheeze, rhonchi or rales, excursions normal and symmetrical  Cardio: Regular rate and rhythm, no murmurs, clicks, gallops or rubs, no edema or varicosities  Abdomen: soft, nontender, nondistended, normoactive bowel sounds, no hernias, no hepatosplenomegaly   Neuro: sensation and strength grossly intact and symmetrical  Psych: alert and oriented to person, place and time  Vascular examination: Bilateral lower extremity examination shows swelling is mild to moderate. Doppler interrogation shows 2+ monophasic right DP and PT, left side shows 2+ monophasic DP and 3+ monophasic PT. Skin: warm and moist.    Labs:  Reviewed      Images:    Reviewed  Shari Urena MD    Assessments:  Patient Active Problem List   Diagnosis Code    Atypical chest pain R07.89    Essential hypertension I10    Hypertensive heart disease I11.9    Constipation K59.00    Contact dermatitis L25.9    Coronary arteriosclerosis in native artery I25.10    Disorder of esophagus K22.9    Edema R60.9    Enthesopathy of knee M76.899    GERD (gastroesophageal reflux disease) K21.9    Hypercholesterolemia E78.00    Obesity E66.9    Tinea barbae B35.0    Vitamin D deficiency E55.9    Chest pain R07.9    Irritable bowel syndrome with constipation K58.1    Mixed hyperlipidemia E78.2    Acute CHF ([address] I50.9    Severe obesity (BMI 35.0-39. 9) with comorbidity (HCC) E66.01    Hypertensive heart disease with congestive heart failure (HCC) I11.0    History of carotid stenosis Z86.79    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 I83.891    Right sided numbness R20.0    Peripheral vascular disease (HCC) I73.9       Plans: I do not have access to the SHAHZAD and carotid duplex ultrasound report.   I do recommend to repeat these tests in our system if you unable to get it. Most likely patient has PAD, chronic venous hypertension both lower extremity. Patient is recommended closely follow-up these 2 issues. If swelling does not improve conservatively and SHAHZAD is reasonable we will probably recommend compression stocking. And also need repeat carotid duplex ultrasound. Patient has significant PAD secondary to atherosclerotic disease process. Patient was discussed about risk factors and risk factor modifications involving PAD. Patient's right foot has a significant diminished Doppler signal with a monophasic. If her symptoms does not improve, most likely I will most likely offer him angiogram examination. If I am not able to review these tests then probably needs to have this test done at [facility]. Patient will be reassessed in 3 months.           Olga Canales MD

## 2023-03-07 ENCOUNTER — TELEPHONE (OUTPATIENT)
Dept: SURGERY | Age: 63
End: 2023-03-07

## 2023-03-07 DIAGNOSIS — I73.9 PERIPHERAL VASCULAR DISEASE (HCC): ICD-10-CM

## 2023-03-07 DIAGNOSIS — I83.93 VARICOSE VEINS OF BOTH LOWER EXTREMITIES, UNSPECIFIED WHETHER COMPLICATED: ICD-10-CM

## 2023-03-07 DIAGNOSIS — M79.604 PAIN IN BOTH LOWER EXTREMITIES: Primary | ICD-10-CM

## 2023-03-07 DIAGNOSIS — M79.605 PAIN IN BOTH LOWER EXTREMITIES: Primary | ICD-10-CM

## 2023-03-07 DIAGNOSIS — I65.29 STENOSIS OF CAROTID ARTERY, UNSPECIFIED LATERALITY: ICD-10-CM

## 2023-03-07 NOTE — TELEPHONE ENCOUNTER
Hernandez Wong from [facility] called and asked for updated orders with out an expiration date to be resent, if needed please call [phone]

## 2023-03-15 RX ORDER — CARVEDILOL 25 MG/1
TABLET ORAL
Qty: 180 TABLET | Refills: 0 | Status: SHIPPED | OUTPATIENT
Start: 2023-03-15

## 2023-03-16 RX ORDER — ATORVASTATIN CALCIUM 80 MG/1
TABLET, FILM COATED ORAL
Qty: 90 TABLET | Refills: 0 | Status: SHIPPED | OUTPATIENT
Start: 2023-03-16

## 2023-03-27 ENCOUNTER — HOSPITAL ENCOUNTER (OUTPATIENT)
Dept: NON INVASIVE DIAGNOSTICS | Age: 63
End: 2023-03-27
Attending: SURGERY
Payer: COMMERCIAL

## 2023-03-27 ENCOUNTER — HOSPITAL ENCOUNTER (OUTPATIENT)
Dept: NON INVASIVE DIAGNOSTICS | Age: 63
Discharge: HOME | End: 2023-03-27
Attending: SURGERY
Payer: COMMERCIAL

## 2023-03-27 DIAGNOSIS — I65.29 STENOSIS OF CAROTID ARTERY, UNSPECIFIED LATERALITY: ICD-10-CM

## 2023-03-27 DIAGNOSIS — M79.605 PAIN IN BOTH LOWER EXTREMITIES: ICD-10-CM

## 2023-03-27 DIAGNOSIS — I83.93 VARICOSE VEINS OF BOTH LOWER EXTREMITIES, UNSPECIFIED WHETHER COMPLICATED: ICD-10-CM

## 2023-03-27 DIAGNOSIS — I73.9 PERIPHERAL VASCULAR DISEASE (HCC): ICD-10-CM

## 2023-03-27 DIAGNOSIS — M79.604 PAIN IN BOTH LOWER EXTREMITIES: ICD-10-CM

## 2023-03-27 PROCEDURE — 93970 EXTREMITY STUDY: CPT

## 2023-03-27 PROCEDURE — 93923 UPR/LXTR ART STDY 3+ LVLS: CPT

## 2023-03-27 PROCEDURE — 93880 EXTRACRANIAL BILAT STUDY: CPT

## 2023-03-28 LAB
LEFT ABI: 1.05
LEFT ARM BP: 136 MMHG
LEFT ARM BP: 139 MMHG
LEFT CALF PRESSURE: 158 MMHG
LEFT CCA DIST DIAS: 16 CM/S
LEFT CCA DIST SYS: 65.7 CM/S
LEFT CCA PROX DIAS: 11.5 CM/S
LEFT CCA PROX SYS: 121 CM/S
LEFT ECA DIAS: 5.68 CM/S
LEFT ECA SYS: 50.8 CM/S
LEFT ICA DIST DIAS: 20.2 CM/S
LEFT ICA DIST SYS: 44 CM/S
LEFT ICA PROX DIAS: 10.4 CM/S
LEFT ICA PROX SYS: 31.4 CM/S
LEFT ICA/CCA SYS: 0.48
LEFT POSTERIOR TIBIAL: 152 MMHG
LEFT TBI: 0.67
LEFT TOE PRESSURE: 97 MMHG
LEFT VERTEBRAL DIAS: 16.8 CM/S
LEFT VERTEBRAL SYS: 37.6 CM/S
RIGHT ABI: 1.17
RIGHT ARM BP: 137 MMHG
RIGHT ARM BP: 145 MMHG
RIGHT CALF PRESSURE: 169 MMHG
RIGHT CCA DIST DIAS: 0 CM/S
RIGHT CCA DIST SYS: 49.7 CM/S
RIGHT CCA PROX DIAS: 0 CM/S
RIGHT CCA PROX SYS: 97.8 CM/S
RIGHT ECA DIAS: 0 CM/S
RIGHT ECA SYS: 54 CM/S
RIGHT HIGH THIGH PRESSURE: 152 MMHG
RIGHT ICA PROX DIAS: 6.2 CM/S
RIGHT ICA PROX SYS: 32 CM/S
RIGHT ICA/CCA SYS: 0.64
RIGHT POSTERIOR TIBIAL: 169 MMHG
RIGHT TBI: 0.81
RIGHT TOE PRESSURE: 117 MMHG
RIGHT VERTEBRAL DIAS: 15.7 CM/S
RIGHT VERTEBRAL SYS: 38 CM/S
VAS LEFT DORSALIS PEDIS BP: 149 MMHG
VAS LEFT SUBCLAVIAN PROX EDV: 0 CM/S
VAS LEFT SUBCLAVIAN PROX PSV: 115 CM/S
VAS RIGHT DORSALIS PEDIS BP: 133 MMHG
VAS RIGHT SUBCLAVIAN PROX EDV: 0 CM/S
VAS RIGHT SUBCLAVIAN PROX PSV: 80.2 CM/S

## 2023-03-28 PROCEDURE — 93880 EXTRACRANIAL BILAT STUDY: CPT | Performed by: SURGERY

## 2023-05-08 RX ORDER — FUROSEMIDE 40 MG/1
TABLET ORAL
Qty: 90 TABLET | Refills: 0 | Status: SHIPPED | OUTPATIENT
Start: 2023-05-08

## 2023-05-18 RX ORDER — PANTOPRAZOLE SODIUM 40 MG/1
40 TABLET, DELAYED RELEASE ORAL DAILY
COMMUNITY
Start: 2022-08-23 | End: 2023-05-26

## 2023-05-18 RX ORDER — LEVOFLOXACIN 750 MG/1
750 TABLET ORAL DAILY
COMMUNITY
Start: 2023-01-07

## 2023-05-18 RX ORDER — CHLORTHALIDONE 25 MG/1
25 TABLET ORAL DAILY
COMMUNITY
Start: 2023-02-02 | End: 2023-05-26

## 2023-05-18 RX ORDER — FOLIC ACID 1 MG/1
1 TABLET ORAL DAILY
COMMUNITY

## 2023-05-18 RX ORDER — CARVEDILOL 25 MG/1
1 TABLET ORAL 2 TIMES DAILY
COMMUNITY
Start: 2023-03-15

## 2023-05-18 RX ORDER — LUBIPROSTONE 24 UG/1
1 CAPSULE ORAL 2 TIMES DAILY WITH MEALS
COMMUNITY
Start: 2021-10-11

## 2023-05-18 RX ORDER — ATORVASTATIN CALCIUM 80 MG/1
1 TABLET, FILM COATED ORAL DAILY
COMMUNITY
Start: 2023-03-16

## 2023-05-18 RX ORDER — CLOPIDOGREL BISULFATE 75 MG/1
75 TABLET ORAL DAILY
COMMUNITY
Start: 2023-01-08

## 2023-05-18 RX ORDER — ASPIRIN 81 MG/1
1 TABLET ORAL DAILY
COMMUNITY
Start: 2021-08-05

## 2023-05-25 ENCOUNTER — OFFICE VISIT (OUTPATIENT)
Facility: CLINIC | Age: 63
End: 2023-05-25
Payer: COMMERCIAL

## 2023-05-25 VITALS
WEIGHT: 289.6 LBS | HEART RATE: 67 BPM | TEMPERATURE: 97.9 F | OXYGEN SATURATION: 97 % | HEIGHT: 73 IN | RESPIRATION RATE: 18 BRPM | SYSTOLIC BLOOD PRESSURE: 128 MMHG | BODY MASS INDEX: 38.38 KG/M2 | DIASTOLIC BLOOD PRESSURE: 74 MMHG

## 2023-05-25 DIAGNOSIS — M25.552 LEFT HIP PAIN: ICD-10-CM

## 2023-05-25 DIAGNOSIS — E78.2 MIXED HYPERLIPIDEMIA: ICD-10-CM

## 2023-05-25 DIAGNOSIS — K21.00 GASTROESOPHAGEAL REFLUX DISEASE WITH ESOPHAGITIS WITHOUT HEMORRHAGE: ICD-10-CM

## 2023-05-25 DIAGNOSIS — Z86.2 HISTORY OF ANEMIA: ICD-10-CM

## 2023-05-25 DIAGNOSIS — E66.01 SEVERE OBESITY (BMI 35.0-39.9) WITH COMORBIDITY (HCC): ICD-10-CM

## 2023-05-25 DIAGNOSIS — I11.9 HYPERTENSIVE HEART DISEASE WITHOUT HEART FAILURE: Primary | ICD-10-CM

## 2023-05-25 DIAGNOSIS — Z92.89 HISTORY OF TRANSFUSION: ICD-10-CM

## 2023-05-25 DIAGNOSIS — I50.22 CHRONIC SYSTOLIC (CONGESTIVE) HEART FAILURE (HCC): ICD-10-CM

## 2023-05-25 DIAGNOSIS — K58.1 IRRITABLE BOWEL SYNDROME WITH CONSTIPATION: ICD-10-CM

## 2023-05-25 DIAGNOSIS — E55.9 VITAMIN D DEFICIENCY: ICD-10-CM

## 2023-05-25 PROCEDURE — 3074F SYST BP LT 130 MM HG: CPT | Performed by: FAMILY MEDICINE

## 2023-05-25 PROCEDURE — 99214 OFFICE O/P EST MOD 30 MIN: CPT | Performed by: FAMILY MEDICINE

## 2023-05-25 PROCEDURE — 3078F DIAST BP <80 MM HG: CPT | Performed by: FAMILY MEDICINE

## 2023-05-25 RX ORDER — ACETAMINOPHEN 500 MG
500 TABLET ORAL EVERY 6 HOURS PRN
Qty: 100 TABLET | Refills: 1 | Status: SHIPPED | OUTPATIENT
Start: 2023-05-25

## 2023-05-25 SDOH — ECONOMIC STABILITY: INCOME INSECURITY: HOW HARD IS IT FOR YOU TO PAY FOR THE VERY BASICS LIKE FOOD, HOUSING, MEDICAL CARE, AND HEATING?: NOT HARD AT ALL

## 2023-05-25 SDOH — ECONOMIC STABILITY: FOOD INSECURITY: WITHIN THE PAST 12 MONTHS, THE FOOD YOU BOUGHT JUST DIDN'T LAST AND YOU DIDN'T HAVE MONEY TO GET MORE.: NEVER TRUE

## 2023-05-25 SDOH — ECONOMIC STABILITY: HOUSING INSECURITY
IN THE LAST 12 MONTHS, WAS THERE A TIME WHEN YOU DID NOT HAVE A STEADY PLACE TO SLEEP OR SLEPT IN A SHELTER (INCLUDING NOW)?: NO

## 2023-05-25 SDOH — ECONOMIC STABILITY: FOOD INSECURITY: WITHIN THE PAST 12 MONTHS, YOU WORRIED THAT YOUR FOOD WOULD RUN OUT BEFORE YOU GOT MONEY TO BUY MORE.: NEVER TRUE

## 2023-05-25 ASSESSMENT — PATIENT HEALTH QUESTIONNAIRE - PHQ9
2. FEELING DOWN, DEPRESSED OR HOPELESS: 0
1. LITTLE INTEREST OR PLEASURE IN DOING THINGS: 0
SUM OF ALL RESPONSES TO PHQ QUESTIONS 1-9: 0
SUM OF ALL RESPONSES TO PHQ9 QUESTIONS 1 & 2: 0

## 2023-05-25 NOTE — PROGRESS NOTES
1. \"Have you been to the ER, urgent care clinic since your last visit? Hospitalized since your last visit? \" none    2. \"Have you seen or consulted any other health care providers outside of the [address] [place] [address] since your last visit? \" none     3. For patients aged 39-70: Has the patient had a colonoscopy / FIT/ Cologuard? No      If the patient is female:    4. For patients aged 41-77: Has the patient had a mammogram within the past 2 years? none      5. For patients aged 21-65: Has the patient had a pap smear?  None    Chief Complaint   Patient presents with    Other    Back Pain    Hip Pain        /74 (Site: Left Upper Arm, Position: Sitting, Cuff Size: Large Adult)   Pulse 67   Temp 97.9 °F (36.6 °C) (Oral)   Resp 18   Ht 6' 1\" (1.854 m)   Wt 289 lb 9.6 oz (131.4 kg)   SpO2 97%   BMI 38.21 kg/m²      Patient is here for back and hip pain (left)  for 1 month now

## 2023-05-25 NOTE — H&P
1 tablet by mouth daily      levoFLOXacin (LEVAQUIN) 750 MG tablet Take 1 tablet by mouth daily      linaclotide (LINZESS) 290 MCG CAPS capsule TAKE 1 CAPSULE BY MOUTH EVERY DAY AS NEEDED      lubiprostone (AMITIZA) 24 MCG capsule Take 1 capsule by mouth 2 times daily (with meals)      pantoprazole (PROTONIX) 40 MG tablet Take 1 tablet by mouth daily      furosemide (LASIX) 40 MG tablet TAKE 1 TABLET BY MOUTH EVERY DAY 90 tablet 0     No current facility-administered medications for this visit. No Known Allergies    Objective:  /74 (Site: Left Upper Arm, Position: Sitting, Cuff Size: Large Adult)   Pulse 67   Temp 97.9 °F (36.6 °C) (Oral)   Resp 18   Ht 6' 1\" (1.854 m)   Wt 289 lb 9.6 oz (131.4 kg)   SpO2 97%   BMI 38.21 kg/m²     Physical Exam:   Physical Exam         No results found for this visit on 05/25/23. Assessment/Plan:    ICD-10-CM    1. Hypertensive heart disease without heart failure  I11.9       2. Chronic systolic (congestive) heart failure  I50.22       3. Gastroesophageal reflux disease with esophagitis without hemorrhage  K21.00       4. Irritable bowel syndrome with constipation  K58.1       5. Mixed hyperlipidemia  E78.2       6. Severe obesity (BMI 35.0-39. 9) with comorbidity ([facility] [address]  E66.01       7. Vitamin D deficiency  E55.9         No orders of the defined types were placed in this encounter. Cannot display discharge medications since this is not an admission.

## 2023-05-26 DIAGNOSIS — E87.6 HYPOKALEMIA: ICD-10-CM

## 2023-05-26 RX ORDER — CHLORTHALIDONE 25 MG/1
TABLET ORAL
Qty: 90 TABLET | Refills: 0 | Status: SHIPPED | OUTPATIENT
Start: 2023-05-26

## 2023-05-26 RX ORDER — POTASSIUM CHLORIDE 750 MG/1
CAPSULE, EXTENDED RELEASE ORAL
Qty: 90 CAPSULE | Refills: 1 | Status: SHIPPED | OUTPATIENT
Start: 2023-05-26

## 2023-05-26 RX ORDER — PANTOPRAZOLE SODIUM 40 MG/1
TABLET, DELAYED RELEASE ORAL
Qty: 90 TABLET | Refills: 1 | Status: SHIPPED | OUTPATIENT
Start: 2023-05-26

## 2023-05-27 LAB
ALBUMIN SERPL-MCNC: 4.3 G/DL (ref 3.8–4.8)
ALBUMIN/GLOB SERPL: 1.5 {RATIO} (ref 1.2–2.2)
ALP SERPL-CCNC: 94 IU/L (ref 44–121)
ALT SERPL-CCNC: 17 IU/L (ref 0–44)
AST SERPL-CCNC: 17 IU/L (ref 0–40)
BASOPHILS # BLD AUTO: 0 X10E3/UL (ref 0–0.2)
BASOPHILS NFR BLD AUTO: 0 %
BILIRUB SERPL-MCNC: 0.4 MG/DL (ref 0–1.2)
BUN SERPL-MCNC: 17 MG/DL (ref 8–27)
BUN/CREAT SERPL: 17 (ref 10–24)
CALCIUM SERPL-MCNC: 10.1 MG/DL (ref 8.6–10.2)
CHLORIDE SERPL-SCNC: 103 MMOL/L (ref 96–106)
CHOLEST SERPL-MCNC: 196 MG/DL (ref 100–199)
CO2 SERPL-SCNC: 26 MMOL/L (ref 20–29)
CREAT SERPL-MCNC: 1 MG/DL (ref 0.76–1.27)
EGFRCR SERPLBLD CKD-EPI 2021: 84 ML/MIN/1.73
EOSINOPHIL # BLD AUTO: 0.1 X10E3/UL (ref 0–0.4)
EOSINOPHIL NFR BLD AUTO: 1 %
ERYTHROCYTE [DISTWIDTH] IN BLOOD BY AUTOMATED COUNT: 15 % (ref 11.6–15.4)
GLOBULIN SER CALC-MCNC: 2.8 G/DL (ref 1.5–4.5)
GLUCOSE SERPL-MCNC: 169 MG/DL (ref 70–99)
HCT VFR BLD AUTO: 41.4 % (ref 37.5–51)
HDLC SERPL-MCNC: 38 MG/DL
HGB BLD-MCNC: 13.3 G/DL (ref 13–17.7)
IMM GRANULOCYTES # BLD AUTO: 0 X10E3/UL (ref 0–0.1)
IMM GRANULOCYTES NFR BLD AUTO: 0 %
LDLC SERPL CALC-MCNC: 104 MG/DL (ref 0–99)
LYMPHOCYTES # BLD AUTO: 1 X10E3/UL (ref 0.7–3.1)
LYMPHOCYTES NFR BLD AUTO: 18 %
MCH RBC QN AUTO: 27.2 PG (ref 26.6–33)
MCHC RBC AUTO-ENTMCNC: 32.1 G/DL (ref 31.5–35.7)
MCV RBC AUTO: 85 FL (ref 79–97)
MONOCYTES # BLD AUTO: 0.4 X10E3/UL (ref 0.1–0.9)
MONOCYTES NFR BLD AUTO: 7 %
NEUTROPHILS # BLD AUTO: 4.3 X10E3/UL (ref 1.4–7)
NEUTROPHILS NFR BLD AUTO: 74 %
PLATELET # BLD AUTO: 141 X10E3/UL (ref 150–450)
POTASSIUM SERPL-SCNC: 3.8 MMOL/L (ref 3.5–5.2)
PROT SERPL-MCNC: 7.1 G/DL (ref 6–8.5)
RBC # BLD AUTO: 4.89 X10E6/UL (ref 4.14–5.8)
SODIUM SERPL-SCNC: 141 MMOL/L (ref 134–144)
TRIGL SERPL-MCNC: 319 MG/DL (ref 0–149)
VLDLC SERPL CALC-MCNC: 54 MG/DL (ref 5–40)
WBC # BLD AUTO: 5.8 X10E3/UL (ref 3.4–10.8)

## 2023-05-30 ENCOUNTER — TELEPHONE (OUTPATIENT)
Facility: CLINIC | Age: 63
End: 2023-05-30

## 2023-06-02 ENCOUNTER — TELEPHONE (OUTPATIENT)
Facility: CLINIC | Age: 63
End: 2023-06-02

## 2023-06-02 RX ORDER — HYDROXYZINE HYDROCHLORIDE 25 MG/1
TABLET, FILM COATED ORAL
OUTPATIENT
Start: 2023-06-02

## 2023-06-02 RX ORDER — HYDROXYZINE HYDROCHLORIDE 10 MG/1
TABLET, FILM COATED ORAL
Qty: 45 TABLET | OUTPATIENT
Start: 2023-06-02

## 2023-06-02 RX ORDER — HYDROXYZINE HYDROCHLORIDE 25 MG/1
TABLET, FILM COATED ORAL
COMMUNITY
End: 2023-06-23 | Stop reason: DRUGHIGH

## 2023-06-23 ENCOUNTER — OFFICE VISIT (OUTPATIENT)
Facility: CLINIC | Age: 63
End: 2023-06-23
Payer: COMMERCIAL

## 2023-06-23 VITALS
DIASTOLIC BLOOD PRESSURE: 87 MMHG | RESPIRATION RATE: 16 BRPM | BODY MASS INDEX: 39.12 KG/M2 | OXYGEN SATURATION: 97 % | HEIGHT: 73 IN | HEART RATE: 56 BPM | TEMPERATURE: 97.8 F | WEIGHT: 295.2 LBS | SYSTOLIC BLOOD PRESSURE: 135 MMHG

## 2023-06-23 DIAGNOSIS — Z86.79 HISTORY OF CAROTID STENOSIS: ICD-10-CM

## 2023-06-23 DIAGNOSIS — I11.0 HYPERTENSIVE HEART DISEASE WITH CONGESTIVE HEART FAILURE, UNSPECIFIED HEART FAILURE TYPE (HCC): Primary | ICD-10-CM

## 2023-06-23 DIAGNOSIS — I50.22 CHRONIC SYSTOLIC (CONGESTIVE) HEART FAILURE (HCC): ICD-10-CM

## 2023-06-23 DIAGNOSIS — E66.01 SEVERE OBESITY (BMI 35.0-39.9) WITH COMORBIDITY (HCC): ICD-10-CM

## 2023-06-23 DIAGNOSIS — I73.9 PERIPHERAL VASCULAR DISEASE (HCC): ICD-10-CM

## 2023-06-23 DIAGNOSIS — L29.9 PRURITIC DERMATITIS: ICD-10-CM

## 2023-06-23 DIAGNOSIS — K21.00 GASTROESOPHAGEAL REFLUX DISEASE WITH ESOPHAGITIS WITHOUT HEMORRHAGE: ICD-10-CM

## 2023-06-23 DIAGNOSIS — M76.899 ENTHESOPATHY OF KNEE: ICD-10-CM

## 2023-06-23 DIAGNOSIS — E78.2 MIXED HYPERLIPIDEMIA: ICD-10-CM

## 2023-06-23 DIAGNOSIS — E55.9 VITAMIN D DEFICIENCY: ICD-10-CM

## 2023-06-23 PROBLEM — L30.8 PRURITIC DERMATITIS: Status: ACTIVE | Noted: 2023-06-23

## 2023-06-23 PROCEDURE — 3075F SYST BP GE 130 - 139MM HG: CPT | Performed by: FAMILY MEDICINE

## 2023-06-23 PROCEDURE — 3079F DIAST BP 80-89 MM HG: CPT | Performed by: FAMILY MEDICINE

## 2023-06-23 PROCEDURE — 99214 OFFICE O/P EST MOD 30 MIN: CPT | Performed by: FAMILY MEDICINE

## 2023-06-23 RX ORDER — HYDROXYZINE HYDROCHLORIDE 10 MG/1
10 TABLET, FILM COATED ORAL DAILY PRN
Qty: 30 TABLET | Refills: 1 | Status: SHIPPED | OUTPATIENT
Start: 2023-06-23 | End: 2023-07-23

## 2023-06-23 NOTE — PROGRESS NOTES
Mitzi Douglass is a 58 y.o. male and presents with Neck Pain (Right side) and Arm Pain (Right side)  . Neck Pain     Arm Pain    with a hx of HTN   58 y.o. Patient here on a routine follow up with no recent symptoms except persistent pruritus with rash at times unmanageable by dermatologis and alleviated by hydroxyzine    Subjective:  Cardiovascular Review:  The patient has hypertension   Diet and Lifestyle: generally follows a low fat low cholesterol diet, generally follows a low sodium diet, exercises sporadically  Home BP Monitoring: is not measured at home. Pertinent ROS: taking medications as instructed, no medication side effects noted, no TIA's, no chest pain on exertion, no dyspnea on exertion, no swelling of ankles.      Review of Systems  Review of Systems - Dermatological ROS: positive for - pruritus and rash       Past Medical History:   Diagnosis Date    Anemia     iron and blood transfusions    Atypical chest pain 8/26/2020    Constipation 8/26/2020    Contact dermatitis 8/26/2020    Coronary arteriosclerosis in native artery 8/26/2020    Disorder of esophagus 8/26/2020    Edema 8/26/2020    Enthesopathy of knee 8/26/2020    Essential hypertension 8/26/2020    GERD (gastroesophageal reflux disease) 8/26/2020    Hypercholesterolemia 8/26/2020    Hypertensive heart disease 8/26/2020    Ill-defined condition     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

## 2023-06-23 NOTE — PROGRESS NOTES
1. \"Have you been to the ER, urgent care clinic since your last visit? Hospitalized since your last visit? \" No     2. \"Have you seen or consulted any other health care providers outside of the [address] [place] [address] since your last visit? \" No      3. For patients aged 39-70: Has the patient had a colonoscopy / FIT/ Cologuard? Yes      If the patient is female:    4. For patients aged 41-77: Has the patient had a mammogram within the past 2 years? N/a based off age or sex       11. For patients aged 21-65: Has the patient had a pap smear?  N/a based off age or sex       Chief Complaint   Patient presents with    Neck Pain     Right side    Arm Pain     Right side     /87 (Site: Left Upper Arm, Position: Sitting, Cuff Size: Medium Adult)   Pulse 56   Temp 97.8 °F (36.6 °C) (Oral)   Resp 16   Ht 6' 1\" (1.854 m)   Wt 295 lb 3.2 oz (133.9 kg)   SpO2 97%   BMI 38.95 kg/m²     Pt is here because he is experiencing neck pain and arm pain on right side

## 2023-07-03 ENCOUNTER — TELEPHONE (OUTPATIENT)
Facility: CLINIC | Age: 63
End: 2023-07-03

## 2023-07-03 NOTE — TELEPHONE ENCOUNTER
Patient called and said he was suppose to get a cream for his rash he came in about on the 23rd. Were you still going to send this I dont see where it was sent to pharmacy.

## 2023-07-07 ENCOUNTER — PROCEDURE VISIT (OUTPATIENT)
Facility: CLINIC | Age: 63
End: 2023-07-07
Payer: COMMERCIAL

## 2023-07-07 VITALS
RESPIRATION RATE: 16 BRPM | DIASTOLIC BLOOD PRESSURE: 84 MMHG | SYSTOLIC BLOOD PRESSURE: 142 MMHG | HEART RATE: 73 BPM | WEIGHT: 296 LBS | HEIGHT: 73 IN | BODY MASS INDEX: 39.23 KG/M2

## 2023-07-07 DIAGNOSIS — L98.9 FACIAL SKIN LESION: Primary | ICD-10-CM

## 2023-07-07 PROCEDURE — 3079F DIAST BP 80-89 MM HG: CPT | Performed by: FAMILY MEDICINE

## 2023-07-07 PROCEDURE — 17110 DESTRUCTION B9 LES UP TO 14: CPT | Performed by: FAMILY MEDICINE

## 2023-07-07 PROCEDURE — 99213 OFFICE O/P EST LOW 20 MIN: CPT | Performed by: FAMILY MEDICINE

## 2023-07-07 PROCEDURE — 3077F SYST BP >= 140 MM HG: CPT | Performed by: FAMILY MEDICINE

## 2023-07-07 NOTE — PROGRESS NOTES
([id number]) Skin Lesion Destruct - 1-14 Benign    Date/Time: 7/7/2023 5:08 PM  Performed by: Virgil Aguirre MD  Authorized by: Virgil Aguirre MD     Number of Lesions: 1  Lesion 1:     Body area: head/neck    Head/neck location: R cheek    Initial size (mm): 4    Final defect size (mm): 6    Malignancy: benign lesion      Destruction method: curettage        Comments:  After obtaining consent from [id number]\" scalpel blade excision effected after 1.5cc lidocaine with no epinephrine injection befpre which betadiene cleanses applied  Silver nitrate hemostasis applied with no blood loss

## 2023-07-20 RX ORDER — HYDROXYZINE HYDROCHLORIDE 10 MG/1
10 TABLET, FILM COATED ORAL DAILY PRN
Qty: 30 TABLET | Refills: 0 | Status: SHIPPED | OUTPATIENT
Start: 2023-07-20 | End: 2023-08-19

## 2023-08-14 ENCOUNTER — TRANSCRIBE ORDERS (OUTPATIENT)
Facility: HOSPITAL | Age: 63
End: 2023-08-14

## 2023-08-14 DIAGNOSIS — R07.9 CHEST PAIN, UNSPECIFIED TYPE: ICD-10-CM

## 2023-08-14 DIAGNOSIS — I10 PRIMARY HYPERTENSION: Primary | ICD-10-CM

## 2023-08-14 DIAGNOSIS — I25.10 CORONARY ARTERY DISEASE INVOLVING NATIVE CORONARY ARTERY OF NATIVE HEART WITHOUT ANGINA PECTORIS: ICD-10-CM

## 2023-08-21 RX ORDER — HYDROXYZINE HYDROCHLORIDE 10 MG/1
10 TABLET, FILM COATED ORAL 3 TIMES DAILY PRN
COMMUNITY
End: 2023-08-21 | Stop reason: SDUPTHER

## 2023-08-22 RX ORDER — HYDROXYZINE HYDROCHLORIDE 10 MG/1
10 TABLET, FILM COATED ORAL 2 TIMES DAILY PRN
Qty: 60 TABLET | Refills: 0 | Status: SHIPPED | OUTPATIENT
Start: 2023-08-22

## 2023-08-28 RX ORDER — CHLORTHALIDONE 25 MG/1
TABLET ORAL
Qty: 90 TABLET | Refills: 0 | Status: SHIPPED | OUTPATIENT
Start: 2023-08-28

## 2023-09-06 RX ORDER — ATORVASTATIN CALCIUM 80 MG/1
TABLET, FILM COATED ORAL
Qty: 90 TABLET | Refills: 0 | Status: SHIPPED | OUTPATIENT
Start: 2023-09-06

## 2023-10-20 RX ORDER — HYDROXYZINE HYDROCHLORIDE 10 MG/1
TABLET, FILM COATED ORAL
Qty: 30 TABLET | OUTPATIENT
Start: 2023-10-20

## 2023-12-02 DIAGNOSIS — E87.6 HYPOKALEMIA: ICD-10-CM

## 2023-12-05 RX ORDER — CHLORTHALIDONE 25 MG/1
TABLET ORAL
Qty: 90 TABLET | Refills: 0 | Status: SHIPPED | OUTPATIENT
Start: 2023-12-05

## 2023-12-05 RX ORDER — POTASSIUM CHLORIDE 750 MG/1
CAPSULE, EXTENDED RELEASE ORAL
Qty: 90 CAPSULE | Refills: 1 | Status: SHIPPED | OUTPATIENT
Start: 2023-12-05

## 2023-12-05 RX ORDER — PANTOPRAZOLE SODIUM 40 MG/1
TABLET, DELAYED RELEASE ORAL
Qty: 90 TABLET | Refills: 0 | Status: SHIPPED | OUTPATIENT
Start: 2023-12-05

## 2023-12-13 ENCOUNTER — OFFICE VISIT (OUTPATIENT)
Facility: CLINIC | Age: 63
End: 2023-12-13
Payer: COMMERCIAL

## 2023-12-13 VITALS
WEIGHT: 298 LBS | TEMPERATURE: 97.3 F | OXYGEN SATURATION: 97 % | BODY MASS INDEX: 39.49 KG/M2 | SYSTOLIC BLOOD PRESSURE: 120 MMHG | HEIGHT: 73 IN | DIASTOLIC BLOOD PRESSURE: 70 MMHG | HEART RATE: 76 BPM

## 2023-12-13 DIAGNOSIS — K21.00 GASTROESOPHAGEAL REFLUX DISEASE WITH ESOPHAGITIS WITHOUT HEMORRHAGE: ICD-10-CM

## 2023-12-13 DIAGNOSIS — Z86.79 HISTORY OF CAROTID STENOSIS: ICD-10-CM

## 2023-12-13 DIAGNOSIS — Z86.73 HISTORY OF CVA (CEREBROVASCULAR ACCIDENT): ICD-10-CM

## 2023-12-13 DIAGNOSIS — R42 DIZZINESS AND GIDDINESS: ICD-10-CM

## 2023-12-13 DIAGNOSIS — E55.9 VITAMIN D DEFICIENCY: ICD-10-CM

## 2023-12-13 DIAGNOSIS — I73.9 PERIPHERAL VASCULAR DISEASE (HCC): ICD-10-CM

## 2023-12-13 DIAGNOSIS — I11.0 HYPERTENSIVE HEART DISEASE WITH CONGESTIVE HEART FAILURE, UNSPECIFIED HEART FAILURE TYPE (HCC): Primary | ICD-10-CM

## 2023-12-13 DIAGNOSIS — R07.89 OTHER CHEST PAIN: ICD-10-CM

## 2023-12-13 DIAGNOSIS — E78.2 MIXED HYPERLIPIDEMIA: ICD-10-CM

## 2023-12-13 DIAGNOSIS — K58.1 IRRITABLE BOWEL SYNDROME WITH CONSTIPATION: ICD-10-CM

## 2023-12-13 DIAGNOSIS — Z28.21 REFUSED INFLUENZA VACCINE: ICD-10-CM

## 2023-12-13 DIAGNOSIS — I50.22 CHRONIC SYSTOLIC (CONGESTIVE) HEART FAILURE (HCC): ICD-10-CM

## 2023-12-13 PROCEDURE — 3078F DIAST BP <80 MM HG: CPT | Performed by: FAMILY MEDICINE

## 2023-12-13 PROCEDURE — 99214 OFFICE O/P EST MOD 30 MIN: CPT | Performed by: FAMILY MEDICINE

## 2023-12-13 PROCEDURE — 3074F SYST BP LT 130 MM HG: CPT | Performed by: FAMILY MEDICINE

## 2023-12-13 SDOH — ECONOMIC STABILITY: FOOD INSECURITY: WITHIN THE PAST 12 MONTHS, THE FOOD YOU BOUGHT JUST DIDN'T LAST AND YOU DIDN'T HAVE MONEY TO GET MORE.: NEVER TRUE

## 2023-12-13 SDOH — ECONOMIC STABILITY: FOOD INSECURITY: WITHIN THE PAST 12 MONTHS, YOU WORRIED THAT YOUR FOOD WOULD RUN OUT BEFORE YOU GOT MONEY TO BUY MORE.: NEVER TRUE

## 2023-12-13 SDOH — ECONOMIC STABILITY: INCOME INSECURITY: HOW HARD IS IT FOR YOU TO PAY FOR THE VERY BASICS LIKE FOOD, HOUSING, MEDICAL CARE, AND HEATING?: NOT HARD AT ALL

## 2023-12-13 ASSESSMENT — PATIENT HEALTH QUESTIONNAIRE - PHQ9
1. LITTLE INTEREST OR PLEASURE IN DOING THINGS: 1
2. FEELING DOWN, DEPRESSED OR HOPELESS: 1
SUM OF ALL RESPONSES TO PHQ QUESTIONS 1-9: 2
SUM OF ALL RESPONSES TO PHQ9 QUESTIONS 1 & 2: 2
SUM OF ALL RESPONSES TO PHQ QUESTIONS 1-9: 2

## 2023-12-13 NOTE — PROGRESS NOTES
Margot Moreno is a 58 y.o. male and presents with Follow-up Chronic Condition  . HPI with a hx of HTN and Hyperlipidemia  58 y.o. Patient here on a routine follow up with no recent symptoms except left neck discomfort that is crampy in pt with hx of rt carotid stenosis. Pt states he saw a cardiologist for chest pain 3 weeks ago and is yet to start medication prescribed by cardiologist    Subjective:  Cardiovascular Review:  The patient has hypertension   Diet and Lifestyle: generally follows a low fat low cholesterol diet, generally follows a low sodium diet, exercises sporadically  Home BP Monitoring: is not measured at home. Pertinent ROS: taking medications as instructed, no medication side effects noted, no TIA's, no chest pain on exertion, no dyspnea on exertion, no swelling of ankles.      Review of Systems  Review of Systems - Cardiovascular ROS: positive for - chest pain and dizziness       Past Medical History:   Diagnosis Date    Anemia     iron and blood transfusions    Atypical chest pain 8/26/2020    Constipation 8/26/2020    Contact dermatitis 8/26/2020    Coronary arteriosclerosis in native artery 8/26/2020    Disorder of esophagus 8/26/2020    Edema 8/26/2020    Enthesopathy of knee 8/26/2020    Essential hypertension 8/26/2020    GERD (gastroesophageal reflux disease) 8/26/2020    Hypercholesterolemia 8/26/2020    Hypertensive heart disease 8/26/2020    Ill-defined condition     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

## 2023-12-14 RX ORDER — ATORVASTATIN CALCIUM 80 MG/1
TABLET, FILM COATED ORAL
Qty: 90 TABLET | Refills: 0 | Status: SHIPPED | OUTPATIENT
Start: 2023-12-14

## 2023-12-30 ENCOUNTER — HOSPITAL ENCOUNTER (INPATIENT)
Facility: HOSPITAL | Age: 63
LOS: 2 days | Discharge: HOME | DRG: 311 | End: 2024-01-01
Attending: STUDENT IN AN ORGANIZED HEALTH CARE EDUCATION/TRAINING PROGRAM | Admitting: INTERNAL MEDICINE
Payer: COMMERCIAL

## 2023-12-30 ENCOUNTER — APPOINTMENT (OUTPATIENT)
Facility: HOSPITAL | Age: 63
DRG: 311 | End: 2023-12-30
Payer: COMMERCIAL

## 2023-12-30 DIAGNOSIS — I24.9 ACUTE CORONARY SYNDROME (HCC): ICD-10-CM

## 2023-12-30 DIAGNOSIS — R07.9 CHEST PAIN, UNSPECIFIED TYPE: Primary | ICD-10-CM

## 2023-12-30 LAB
ALBUMIN SERPL-MCNC: 3.5 G/DL (ref 3.5–5)
ALBUMIN/GLOB SERPL: 0.9 (ref 1.1–2.2)
ALP SERPL-CCNC: 76 U/L (ref 45–117)
ALT SERPL-CCNC: 30 U/L (ref 12–78)
ANION GAP SERPL CALC-SCNC: 6 MMOL/L (ref 5–15)
AST SERPL-CCNC: 16 U/L (ref 15–37)
BASOPHILS # BLD: 0.1 K/UL (ref 0–0.1)
BASOPHILS NFR BLD: 1 % (ref 0–1)
BILIRUB SERPL-MCNC: 0.4 MG/DL (ref 0.2–1)
BUN SERPL-MCNC: 19 MG/DL (ref 6–20)
BUN/CREAT SERPL: 16 (ref 12–20)
CALCIUM SERPL-MCNC: 9.7 MG/DL (ref 8.5–10.1)
CHLORIDE SERPL-SCNC: 105 MMOL/L (ref 97–108)
CO2 SERPL-SCNC: 27 MMOL/L (ref 21–32)
CREAT SERPL-MCNC: 1.19 MG/DL (ref 0.7–1.3)
DIFFERENTIAL METHOD BLD: ABNORMAL
EOSINOPHIL # BLD: 0.1 K/UL (ref 0–0.4)
EOSINOPHIL NFR BLD: 2 % (ref 0–7)
ERYTHROCYTE [DISTWIDTH] IN BLOOD BY AUTOMATED COUNT: 15.7 % (ref 11.5–14.5)
FLUAV AG NPH QL IA: NEGATIVE
FLUBV AG NOSE QL IA: NEGATIVE
GLOBULIN SER CALC-MCNC: 3.8 G/DL (ref 2–4)
GLUCOSE SERPL-MCNC: 199 MG/DL (ref 65–100)
HCT VFR BLD AUTO: 46.1 % (ref 36.6–50.3)
HGB BLD-MCNC: 14.9 G/DL (ref 12.1–17)
IMM GRANULOCYTES # BLD AUTO: 0 K/UL (ref 0–0.04)
IMM GRANULOCYTES NFR BLD AUTO: 0 % (ref 0–0.5)
LYMPHOCYTES # BLD: 1.3 K/UL (ref 0.8–3.5)
LYMPHOCYTES NFR BLD: 21 % (ref 12–49)
MCH RBC QN AUTO: 28.8 PG (ref 26–34)
MCHC RBC AUTO-ENTMCNC: 32.3 G/DL (ref 30–36.5)
MCV RBC AUTO: 89.2 FL (ref 80–99)
MONOCYTES # BLD: 0.2 K/UL (ref 0–1)
MONOCYTES NFR BLD: 3 % (ref 5–13)
NEUTS SEG # BLD: 4.6 K/UL (ref 1.8–8)
NEUTS SEG NFR BLD: 73 % (ref 32–75)
NT PRO BNP: 422 PG/ML
PLATELET # BLD AUTO: 117 K/UL (ref 150–400)
POTASSIUM SERPL-SCNC: 3.5 MMOL/L (ref 3.5–5.1)
PROT SERPL-MCNC: 7.3 G/DL (ref 6.4–8.2)
RBC # BLD AUTO: 5.17 M/UL (ref 4.1–5.7)
RBC MORPH BLD: ABNORMAL
SARS-COV-2 RDRP RESP QL NAA+PROBE: NOT DETECTED
SODIUM SERPL-SCNC: 138 MMOL/L (ref 136–145)
SOURCE: NORMAL
TROPONIN I SERPL HS-MCNC: 54 NG/L (ref 0–76)
WBC # BLD AUTO: 6.3 K/UL (ref 4.1–11.1)
WBC MORPH BLD: ABNORMAL

## 2023-12-30 PROCEDURE — 1100000000 HC RM PRIVATE

## 2023-12-30 PROCEDURE — 80053 COMPREHEN METABOLIC PANEL: CPT

## 2023-12-30 PROCEDURE — 85025 COMPLETE CBC W/AUTO DIFF WBC: CPT

## 2023-12-30 PROCEDURE — 71046 X-RAY EXAM CHEST 2 VIEWS: CPT

## 2023-12-30 PROCEDURE — 84484 ASSAY OF TROPONIN QUANT: CPT

## 2023-12-30 PROCEDURE — 83880 ASSAY OF NATRIURETIC PEPTIDE: CPT

## 2023-12-30 PROCEDURE — 93005 ELECTROCARDIOGRAM TRACING: CPT | Performed by: EMERGENCY MEDICINE

## 2023-12-30 PROCEDURE — 99285 EMERGENCY DEPT VISIT HI MDM: CPT

## 2023-12-30 PROCEDURE — 87804 INFLUENZA ASSAY W/OPTIC: CPT

## 2023-12-30 PROCEDURE — 87635 SARS-COV-2 COVID-19 AMP PRB: CPT

## 2023-12-30 PROCEDURE — 36415 COLL VENOUS BLD VENIPUNCTURE: CPT

## 2023-12-30 ASSESSMENT — PAIN SCALES - GENERAL: PAINLEVEL_OUTOF10: 10

## 2023-12-31 LAB
CHOLEST SERPL-MCNC: 196 MG/DL
EKG ATRIAL RATE: 86 BPM
EKG DIAGNOSIS: NORMAL
EKG P AXIS: 56 DEGREES
EKG P-R INTERVAL: 162 MS
EKG Q-T INTERVAL: 370 MS
EKG QRS DURATION: 106 MS
EKG QTC CALCULATION (BAZETT): 442 MS
EKG R AXIS: -24 DEGREES
EKG T AXIS: 47 DEGREES
EKG VENTRICULAR RATE: 86 BPM
EST. AVERAGE GLUCOSE BLD GHB EST-MCNC: 169 MG/DL
HBA1C MFR BLD: 7.5 % (ref 4–5.6)
HDLC SERPL-MCNC: 42 MG/DL
HDLC SERPL: 4.7 (ref 0–5)
LDLC SERPL CALC-MCNC: 128.6 MG/DL (ref 0–100)
TRIGL SERPL-MCNC: 127 MG/DL
TROPONIN I SERPL HS-MCNC: 61 NG/L (ref 0–76)
VLDLC SERPL CALC-MCNC: 25.4 MG/DL

## 2023-12-31 PROCEDURE — 6370000000 HC RX 637 (ALT 250 FOR IP): Performed by: NURSE PRACTITIONER

## 2023-12-31 PROCEDURE — 6370000000 HC RX 637 (ALT 250 FOR IP): Performed by: INTERNAL MEDICINE

## 2023-12-31 PROCEDURE — 83036 HEMOGLOBIN GLYCOSYLATED A1C: CPT

## 2023-12-31 PROCEDURE — 36415 COLL VENOUS BLD VENIPUNCTURE: CPT

## 2023-12-31 PROCEDURE — 2580000003 HC RX 258: Performed by: INTERNAL MEDICINE

## 2023-12-31 PROCEDURE — 1100000003 HC PRIVATE W/ TELEMETRY

## 2023-12-31 PROCEDURE — 80061 LIPID PANEL: CPT

## 2023-12-31 PROCEDURE — 84484 ASSAY OF TROPONIN QUANT: CPT

## 2023-12-31 RX ORDER — ACETAMINOPHEN 325 MG/1
650 TABLET ORAL EVERY 6 HOURS PRN
Status: DISCONTINUED | OUTPATIENT
Start: 2023-12-31 | End: 2024-01-01 | Stop reason: HOSPADM

## 2023-12-31 RX ORDER — SODIUM CHLORIDE 9 MG/ML
INJECTION, SOLUTION INTRAVENOUS PRN
Status: DISCONTINUED | OUTPATIENT
Start: 2023-12-31 | End: 2024-01-01 | Stop reason: HOSPADM

## 2023-12-31 RX ORDER — PANTOPRAZOLE SODIUM 40 MG/1
40 TABLET, DELAYED RELEASE ORAL DAILY
Status: DISCONTINUED | OUTPATIENT
Start: 2023-12-31 | End: 2024-01-01 | Stop reason: HOSPADM

## 2023-12-31 RX ORDER — LANOLIN ALCOHOL/MO/W.PET/CERES
3 CREAM (GRAM) TOPICAL NIGHTLY
Status: DISCONTINUED | OUTPATIENT
Start: 2023-12-31 | End: 2024-01-01 | Stop reason: HOSPADM

## 2023-12-31 RX ORDER — ACETAMINOPHEN 650 MG/1
650 SUPPOSITORY RECTAL EVERY 6 HOURS PRN
Status: DISCONTINUED | OUTPATIENT
Start: 2023-12-31 | End: 2024-01-01 | Stop reason: HOSPADM

## 2023-12-31 RX ORDER — EZETIMIBE 10 MG/1
10 TABLET ORAL NIGHTLY
Status: DISCONTINUED | OUTPATIENT
Start: 2023-12-31 | End: 2024-01-01 | Stop reason: HOSPADM

## 2023-12-31 RX ORDER — BISACODYL 10 MG
10 SUPPOSITORY, RECTAL RECTAL DAILY PRN
Status: DISCONTINUED | OUTPATIENT
Start: 2023-12-31 | End: 2024-01-01 | Stop reason: HOSPADM

## 2023-12-31 RX ORDER — SODIUM CHLORIDE 0.9 % (FLUSH) 0.9 %
5-40 SYRINGE (ML) INJECTION EVERY 12 HOURS SCHEDULED
Status: DISCONTINUED | OUTPATIENT
Start: 2023-12-31 | End: 2024-01-01 | Stop reason: HOSPADM

## 2023-12-31 RX ORDER — SODIUM CHLORIDE 0.9 % (FLUSH) 0.9 %
5-40 SYRINGE (ML) INJECTION PRN
Status: DISCONTINUED | OUTPATIENT
Start: 2023-12-31 | End: 2024-01-01 | Stop reason: HOSPADM

## 2023-12-31 RX ORDER — ASPIRIN 81 MG/1
81 TABLET ORAL DAILY
Status: DISCONTINUED | OUTPATIENT
Start: 2023-12-31 | End: 2024-01-01 | Stop reason: HOSPADM

## 2023-12-31 RX ORDER — ONDANSETRON 2 MG/ML
4 INJECTION INTRAMUSCULAR; INTRAVENOUS EVERY 6 HOURS PRN
Status: DISCONTINUED | OUTPATIENT
Start: 2023-12-31 | End: 2024-01-01 | Stop reason: HOSPADM

## 2023-12-31 RX ORDER — ISOSORBIDE MONONITRATE 30 MG/1
30 TABLET, EXTENDED RELEASE ORAL DAILY
Status: DISCONTINUED | OUTPATIENT
Start: 2023-12-31 | End: 2024-01-01 | Stop reason: HOSPADM

## 2023-12-31 RX ORDER — CARVEDILOL 12.5 MG/1
25 TABLET ORAL 2 TIMES DAILY
Status: DISCONTINUED | OUTPATIENT
Start: 2023-12-31 | End: 2024-01-01 | Stop reason: HOSPADM

## 2023-12-31 RX ORDER — HYDRALAZINE HYDROCHLORIDE 20 MG/ML
20 INJECTION INTRAMUSCULAR; INTRAVENOUS EVERY 6 HOURS PRN
Status: DISCONTINUED | OUTPATIENT
Start: 2023-12-31 | End: 2024-01-01 | Stop reason: HOSPADM

## 2023-12-31 RX ORDER — ATORVASTATIN CALCIUM 40 MG/1
80 TABLET, FILM COATED ORAL DAILY
Status: DISCONTINUED | OUTPATIENT
Start: 2023-12-31 | End: 2024-01-01 | Stop reason: HOSPADM

## 2023-12-31 RX ORDER — CHLORTHALIDONE 25 MG/1
25 TABLET ORAL DAILY
Status: DISCONTINUED | OUTPATIENT
Start: 2023-12-31 | End: 2024-01-01 | Stop reason: HOSPADM

## 2023-12-31 RX ORDER — NITROGLYCERIN 0.4 MG/1
0.4 TABLET SUBLINGUAL EVERY 5 MIN PRN
Status: DISCONTINUED | OUTPATIENT
Start: 2023-12-31 | End: 2024-01-01 | Stop reason: HOSPADM

## 2023-12-31 RX ORDER — CLOPIDOGREL BISULFATE 75 MG/1
75 TABLET ORAL DAILY
Status: DISCONTINUED | OUTPATIENT
Start: 2023-12-31 | End: 2024-01-01 | Stop reason: HOSPADM

## 2023-12-31 RX ORDER — POLYETHYLENE GLYCOL 3350 17 G/17G
17 POWDER, FOR SOLUTION ORAL DAILY
Status: DISCONTINUED | OUTPATIENT
Start: 2023-12-31 | End: 2024-01-01 | Stop reason: HOSPADM

## 2023-12-31 RX ORDER — ENOXAPARIN SODIUM 100 MG/ML
30 INJECTION SUBCUTANEOUS 2 TIMES DAILY
Status: DISCONTINUED | OUTPATIENT
Start: 2024-01-01 | End: 2024-01-01 | Stop reason: HOSPADM

## 2023-12-31 RX ORDER — ONDANSETRON 4 MG/1
4 TABLET, ORALLY DISINTEGRATING ORAL EVERY 8 HOURS PRN
Status: DISCONTINUED | OUTPATIENT
Start: 2023-12-31 | End: 2024-01-01 | Stop reason: HOSPADM

## 2023-12-31 RX ADMIN — ASPIRIN 81 MG: 81 TABLET, COATED ORAL at 09:05

## 2023-12-31 RX ADMIN — CARVEDILOL 25 MG: 12.5 TABLET, FILM COATED ORAL at 22:12

## 2023-12-31 RX ADMIN — POLYETHYLENE GLYCOL 3350 17 G: 17 POWDER, FOR SOLUTION ORAL at 09:05

## 2023-12-31 RX ADMIN — ISOSORBIDE MONONITRATE 30 MG: 30 TABLET, EXTENDED RELEASE ORAL at 09:03

## 2023-12-31 RX ADMIN — ATORVASTATIN CALCIUM 80 MG: 40 TABLET, FILM COATED ORAL at 09:03

## 2023-12-31 RX ADMIN — CHLORTHALIDONE 25 MG: 25 TABLET ORAL at 09:04

## 2023-12-31 RX ADMIN — CLOPIDOGREL BISULFATE 75 MG: 75 TABLET ORAL at 09:03

## 2023-12-31 RX ADMIN — SODIUM CHLORIDE, PRESERVATIVE FREE 10 ML: 5 INJECTION INTRAVENOUS at 09:05

## 2023-12-31 RX ADMIN — CARVEDILOL 25 MG: 12.5 TABLET, FILM COATED ORAL at 09:03

## 2023-12-31 RX ADMIN — PANTOPRAZOLE SODIUM 40 MG: 40 TABLET, DELAYED RELEASE ORAL at 09:04

## 2023-12-31 RX ADMIN — EZETIMIBE 10 MG: 10 TABLET ORAL at 22:13

## 2023-12-31 RX ADMIN — MELATONIN 3 MG: at 22:13

## 2023-12-31 RX ADMIN — SODIUM CHLORIDE, PRESERVATIVE FREE 10 ML: 5 INJECTION INTRAVENOUS at 22:13

## 2023-12-31 NOTE — PLAN OF CARE
Problem: Discharge Planning  Goal: Discharge to home or other facility with appropriate resources  Outcome: Progressing  Flowsheets (Taken 12/31/2023 1337)  Discharge to home or other facility with appropriate resources:   Identify discharge learning needs (meds, wound care, etc)   Identify barriers to discharge with patient and caregiver   Refer to discharge planning if patient needs post-hospital services based on physician order or complex needs related to functional status, cognitive ability or social support system     Problem: Safety - Adult  Goal: Free from fall injury  Outcome: Progressing  Flowsheets  Taken 12/31/2023 1337  Free From Fall Injury:   Instruct family/caregiver on patient safety   Based on caregiver fall risk screen, instruct family/caregiver to ask for assistance with transferring infant if caregiver noted to have fall risk factors  Taken 12/31/2023 0757  Free From Fall Injury:   Instruct family/caregiver on patient safety   Based on caregiver fall risk screen, instruct family/caregiver to ask for assistance with transferring infant if caregiver noted to have fall risk factors

## 2023-12-31 NOTE — H&P
Collection Time: 12/30/23  3:26 PM   Result Value Ref Range    Ventricular Rate 86 BPM    Atrial Rate 86 BPM    P-R Interval 162 ms    QRS Duration 106 ms    Q-T Interval 370 ms    QTc Calculation (Bazett) 442 ms    P Axis 56 degrees    R Axis -24 degrees    T Axis 47 degrees    Diagnosis       Sinus rhythm with frequent and consecutive premature ventricular complexes  Minimal voltage criteria for LVH, may be normal variant  Cannot rule out Anterior infarct , age undetermined  Abnormal ECG  No previous ECGs available     CBC with Auto Differential    Collection Time: 12/30/23  3:37 PM   Result Value Ref Range    WBC 6.3 4.1 - 11.1 K/uL    RBC 5.17 4.10 - 5.70 M/uL    Hemoglobin 14.9 12.1 - 17.0 g/dL    Hematocrit 46.1 36.6 - 50.3 %    MCV 89.2 80.0 - 99.0 FL    MCH 28.8 26.0 - 34.0 PG    MCHC 32.3 30.0 - 36.5 g/dL    RDW 15.7 (H) 11.5 - 14.5 %    Platelets 117 (L) 150 - 400 K/uL    Neutrophils % 73 32 - 75 %    Lymphocytes % 21 12 - 49 %    Monocytes % 3 (L) 5 - 13 %    Eosinophils % 2 0 - 7 %    Basophils % 1 0 - 1 %    Immature Granulocytes 0 0.0 - 0.5 %    Neutrophils Absolute 4.6 1.8 - 8.0 K/UL    Lymphocytes Absolute 1.3 0.8 - 3.5 K/UL    Monocytes Absolute 0.2 0.0 - 1.0 K/UL    Eosinophils Absolute 0.1 0.0 - 0.4 K/UL    Basophils Absolute 0.1 0.0 - 0.1 K/UL    Absolute Immature Granulocyte 0.0 0.00 - 0.04 K/UL    Differential Type MANUAL      RBC Comment ANISOCYTOSIS  1+        WBC Comment ATYPICAL LYMPHOCYTES PRESENT     CMP    Collection Time: 12/30/23  3:37 PM   Result Value Ref Range    Sodium 138 136 - 145 mmol/L    Potassium 3.5 3.5 - 5.1 mmol/L    Chloride 105 97 - 108 mmol/L    CO2 27 21 - 32 mmol/L    Anion Gap 6 5 - 15 mmol/L    Glucose 199 (H) 65 - 100 mg/dL    BUN 19 6 - 20 MG/DL    Creatinine 1.19 0.70 - 1.30 MG/DL    Bun/Cre Ratio 16 12 - 20      Est, Glom Filt Rate >60 >60 ml/min/1.73m2    Calcium 9.7 8.5 - 10.1 MG/DL    Total Bilirubin 0.4 0.2 - 1.0 MG/DL    ALT 30 12 - 78 U/L    AST 16 15 - 37

## 2023-12-31 NOTE — CONSULTS
status: Former     Current packs/day: 0.00     Average packs/day: 1 pack/day for 8.0 years (8.0 ttl pk-yrs)     Types: Cigarettes     Start date: 1/1/2001     Quit date: 1/1/2009     Years since quitting: 15.0    Smokeless tobacco: Never   Substance and Sexual Activity    Alcohol use: Yes    Drug use: Never     Social Determinants of Health     Financial Resource Strain: Low Risk  (12/13/2023)    Overall Financial Resource Strain (CARDIA)     Difficulty of Paying Living Expenses: Not hard at all   Food Insecurity: No Food Insecurity (12/13/2023)    Hunger Vital Sign     Worried About Running Out of Food in the Last Year: Never true     Ran Out of Food in the Last Year: Never true   Transportation Needs: Unknown (12/13/2023)    PRAPARE - Transportation     Lack of Transportation (Non-Medical): No   Physical Activity: Insufficiently Active (5/25/2022)    Exercise Vital Sign     Days of Exercise per Week: 2 days     Minutes of Exercise per Session: 20 min   Social Connections: Moderately Integrated (5/25/2022)    Social Connection and Isolation Panel [NHANES]     Frequency of Communication with Friends and Family: Twice a week     Frequency of Social Gatherings with Friends and Family: Twice a week     Attends [employer] Services: 1 to 4 times per year     Active Member of Clubs or Organizations: No     Attends Club or Organization Meetings: Never     Marital Status:    Intimate Partner Violence: Not At Risk (5/25/2022)    Humiliation, Afraid, Rape, and Kick questionnaire     Fear of Current or Ex-Partner: No     Emotionally Abused: No     Physically Abused: No     Sexually Abused: No   Housing Stability: Unknown (12/13/2023)    Housing Stability Vital Sign     Unstable Housing in the Last Year: No     No Known Allergies   Family History   Problem Relation Age of Onset    Diabetes Mother     Hypertension Mother     High Cholesterol Father     Hypertension Father     High Cholesterol Mother       Current

## 2024-01-01 ENCOUNTER — TELEPHONE (OUTPATIENT)
Facility: CLINIC | Age: 64
End: 2024-01-01

## 2024-01-01 VITALS
DIASTOLIC BLOOD PRESSURE: 76 MMHG | HEART RATE: 67 BPM | WEIGHT: 296 LBS | OXYGEN SATURATION: 97 % | SYSTOLIC BLOOD PRESSURE: 118 MMHG | HEIGHT: 73 IN | BODY MASS INDEX: 39.23 KG/M2 | TEMPERATURE: 97.8 F | RESPIRATION RATE: 18 BRPM

## 2024-01-01 LAB
ANION GAP SERPL CALC-SCNC: 3 MMOL/L (ref 5–15)
BASOPHILS # BLD: 0 K/UL (ref 0–0.1)
BASOPHILS NFR BLD: 0 % (ref 0–1)
BUN SERPL-MCNC: 14 MG/DL (ref 6–20)
BUN/CREAT SERPL: 13 (ref 12–20)
CALCIUM SERPL-MCNC: 9.7 MG/DL (ref 8.5–10.1)
CHLORIDE SERPL-SCNC: 102 MMOL/L (ref 97–108)
CO2 SERPL-SCNC: 30 MMOL/L (ref 21–32)
CREAT SERPL-MCNC: 1.07 MG/DL (ref 0.7–1.3)
DIFFERENTIAL METHOD BLD: ABNORMAL
EOSINOPHIL # BLD: 0.1 K/UL (ref 0–0.4)
EOSINOPHIL NFR BLD: 2 % (ref 0–7)
ERYTHROCYTE [DISTWIDTH] IN BLOOD BY AUTOMATED COUNT: 15.7 % (ref 11.5–14.5)
GLUCOSE SERPL-MCNC: 157 MG/DL (ref 65–100)
HCT VFR BLD AUTO: 42.5 % (ref 36.6–50.3)
HGB BLD-MCNC: 13.6 G/DL (ref 12.1–17)
IMM GRANULOCYTES # BLD AUTO: 0 K/UL (ref 0–0.04)
IMM GRANULOCYTES NFR BLD AUTO: 0 % (ref 0–0.5)
LYMPHOCYTES # BLD: 1.2 K/UL (ref 0.8–3.5)
LYMPHOCYTES NFR BLD: 25 % (ref 12–49)
MCH RBC QN AUTO: 28.6 PG (ref 26–34)
MCHC RBC AUTO-ENTMCNC: 32 G/DL (ref 30–36.5)
MCV RBC AUTO: 89.3 FL (ref 80–99)
MONOCYTES # BLD: 0.5 K/UL (ref 0–1)
MONOCYTES NFR BLD: 10 % (ref 5–13)
NEUTS SEG # BLD: 2.8 K/UL (ref 1.8–8)
NEUTS SEG NFR BLD: 62 % (ref 32–75)
NRBC # BLD: 0 K/UL (ref 0–0.01)
NRBC BLD-RTO: 0 PER 100 WBC
PLATELET # BLD AUTO: 101 K/UL (ref 150–400)
POTASSIUM SERPL-SCNC: 3.5 MMOL/L (ref 3.5–5.1)
RBC # BLD AUTO: 4.76 M/UL (ref 4.1–5.7)
SODIUM SERPL-SCNC: 135 MMOL/L (ref 136–145)
WBC # BLD AUTO: 4.6 K/UL (ref 4.1–11.1)

## 2024-01-01 PROCEDURE — 6370000000 HC RX 637 (ALT 250 FOR IP): Performed by: NURSE PRACTITIONER

## 2024-01-01 PROCEDURE — 36415 COLL VENOUS BLD VENIPUNCTURE: CPT

## 2024-01-01 PROCEDURE — 80048 BASIC METABOLIC PNL TOTAL CA: CPT

## 2024-01-01 PROCEDURE — 6370000000 HC RX 637 (ALT 250 FOR IP): Performed by: INTERNAL MEDICINE

## 2024-01-01 PROCEDURE — 6360000002 HC RX W HCPCS: Performed by: INTERNAL MEDICINE

## 2024-01-01 PROCEDURE — 85025 COMPLETE CBC W/AUTO DIFF WBC: CPT

## 2024-01-01 RX ORDER — ISOSORBIDE MONONITRATE 30 MG/1
30 TABLET, EXTENDED RELEASE ORAL DAILY
Qty: 30 TABLET | Refills: 1 | Status: SHIPPED | OUTPATIENT
Start: 2024-01-02

## 2024-01-01 RX ORDER — LINACLOTIDE 290 UG/1
CAPSULE, GELATIN COATED ORAL
Qty: 30 CAPSULE | Refills: 0 | Status: CANCELLED | OUTPATIENT
Start: 2024-01-01

## 2024-01-01 RX ADMIN — ISOSORBIDE MONONITRATE 30 MG: 30 TABLET, EXTENDED RELEASE ORAL at 08:50

## 2024-01-01 RX ADMIN — CLOPIDOGREL BISULFATE 75 MG: 75 TABLET ORAL at 08:50

## 2024-01-01 RX ADMIN — ATORVASTATIN CALCIUM 80 MG: 40 TABLET, FILM COATED ORAL at 08:50

## 2024-01-01 RX ADMIN — ASPIRIN 81 MG: 81 TABLET, COATED ORAL at 08:50

## 2024-01-01 RX ADMIN — PANTOPRAZOLE SODIUM 40 MG: 40 TABLET, DELAYED RELEASE ORAL at 08:50

## 2024-01-01 RX ADMIN — CHLORTHALIDONE 25 MG: 25 TABLET ORAL at 08:50

## 2024-01-01 RX ADMIN — POLYETHYLENE GLYCOL 3350 17 G: 17 POWDER, FOR SOLUTION ORAL at 08:49

## 2024-01-01 RX ADMIN — CARVEDILOL 25 MG: 12.5 TABLET, FILM COATED ORAL at 08:50

## 2024-01-01 RX ADMIN — ENOXAPARIN SODIUM 30 MG: 100 INJECTION SUBCUTANEOUS at 08:50

## 2024-01-01 ASSESSMENT — PAIN SCALES - GENERAL
PAINLEVEL_OUTOF10: 0
PAINLEVEL_OUTOF10: 0

## 2024-01-01 NOTE — PROGRESS NOTES
[facility]  [address], [place] [id number] [phone]  [email or web address]         Cardiology Progress Note      1/1/2024 9:14 AM    Admit Date: 12/30/2023    Admit Diagnosis:   Acute coronary syndrome (HCC) [I24.9]  Chest pain, unspecified type [R07.9]    Interval History/Subjective:     Rashad Richter is feeling better.  DAVISON and chest pain with activity has resolved since adding Imdur yesterday.  He has been able to walk the hallways multiple times yesterday without limiting symptoms.  He tells me he feels much better with addition of Imdur.    /75   Pulse 73   Temp 97.5 °F (36.4 °C) (Oral)   Resp 18   Ht 1.854 m (6' 1\")   Wt 134.3 kg (296 lb)   SpO2 96%   BMI 39.05 kg/m²     Current Facility-Administered Medications   Medication Dose Route Frequency    sodium chloride flush 0.9 % injection 5-40 mL  5-40 mL IntraVENous 2 times per day    sodium chloride flush 0.9 % injection 5-40 mL  5-40 mL IntraVENous PRN    0.9 % sodium chloride infusion   IntraVENous PRN    enoxaparin Sodium (LOVENOX) injection 30 mg  30 mg SubCUTAneous BID    ondansetron (ZOFRAN-ODT) disintegrating tablet 4 mg  4 mg Oral Q8H PRN    Or    ondansetron (ZOFRAN) injection 4 mg  4 mg IntraVENous Q6H PRN    polyethylene glycol (GLYCOLAX) packet 17 g  17 g Oral Daily    bisacodyl (DULCOLAX) suppository 10 mg  10 mg Rectal Daily PRN    acetaminophen (TYLENOL) tablet 650 mg  650 mg Oral Q6H PRN    Or    acetaminophen (TYLENOL) suppository 650 mg  650 mg Rectal Q6H PRN    melatonin tablet 3 mg  3 mg Oral Nightly    nitroGLYCERIN (NITROSTAT) SL tablet 0.4 mg  0.4 mg SubLINGual Q5 Min PRN    aspirin EC tablet 81 mg  81 mg Oral Daily    atorvastatin (LIPITOR) tablet 80 mg  80 mg Oral Daily    carvedilol (COREG) tablet 25 mg  25 mg Oral BID    chlorthalidone (HYGROTON) tablet 25 mg  25 mg Oral Daily    clopidogrel (PLAVIX) tablet 75 mg  75 mg Oral Daily    pantoprazole (PROTONIX) 
Bedside and Verbal shift change report given to Isidra RN (oncoming nurse) by Jael RN (offgoing nurse). Report included the following information Intake/Output, MAR, and Recent Results.    
Bedside shift change report given to Isidra Wood RN   (oncoming nurse) by MEGHNA Elder (offgoing nurse). Report included the following information Nurse Handoff Report, Adult Overview, Intake/Output, MAR, Recent Results, and Neuro Assessment.     07:00 Patient calm, cooperative; reports no pain. Has L AC IV placed in ER> IV (not documented in Epic)    09:05 Isosorbide PO given per order    12:40 Patient walks in the alonso with RN. HF  b/min. No complains of chest pain, discomfort or SOB.     15:00 Sitting in the chair, family at the bedside  
Discharge instructions given to patient/family. Opportunity to ask questions given. Heart monitor, PIV lines removed.  Copies given.     
ELSA [facility], [facility].            1/1/2024      RE: Rashad Richter (YOB: 1960)    To Whom it May Concern:    This is to certify that Rashad Richter was admitted to [facility] from 12/30/2023 to 1/1/2024 for treatment of a medical illness.    Patient may return to work on 01/03//2024.     Please feel free to contact my office if you have any questions or concerns.  Thank you for your assistance in this matter.        Sincerely,   Imelda Mcbride MD  [phone] office    
  12/31/23 0757 (!) 136/100 97.7 °F (36.5 °C) -- 76 19 -- -- --   12/31/23 0245 (!) 144/114 97.6 °F (36.4 °C) Oral 79 19 97 % -- --   12/31/23 0115 (!) 142/91 -- -- 79 18 96 % -- --   12/30/23 2315 (!) 156/103 -- -- 78 22 98 % -- --   12/30/23 1520 (!) 152/90 98.4 °F (36.9 °C) Oral 88 16 96 % -- 135.4 kg (298 lb 8.1 oz)       No intake or output data in the 24 hours ending 12/31/23 0954     I had a face to face encounter and independently examined this patient on 12/31/2023, as outlined below:  PHYSICAL EXAM:  General: Alert, cooperative   EENT:  EOMI. Anicteric sclerae. MMM  Resp:  CTA bilaterally, no wheezing or rales.  No accessory muscle use  CV:  Regular  rhythm,  No edema  GI:  Soft, Non distended, Non tender.  +Bowel sounds  Neurologic:  Alert and oriented X 3, normal speech,   Psych:   Good insight. Not anxious nor agitated  Skin:  No rashes.  No jaundice    Reviewed most current lab test results and cultures  YES  Reviewed most current radiology test results   YES  Review and summation of old records today    NO  Reviewed patient's current orders and MAR    YES  PMH/SH reviewed - no change compared to H&P      Procedures: see electronic medical records for all procedures/Xrays and details which were not copied into this note but were reviewed prior to creation of Plan.      LABS:  I reviewed today's most current labs and imaging studies.  Pertinent labs include:  Recent Labs     12/30/23  1537   WBC 6.3   HGB 14.9   HCT 46.1   *     Recent Labs     12/30/23  1537      K 3.5      CO2 27   GLUCOSE 199*   BUN 19   CREATININE 1.19   CALCIUM 9.7   LABALBU 3.5   BILITOT 0.4   AST 16   ALT 30       Signed: Imelda Mcbride MD

## 2024-01-01 NOTE — CARE COORDINATION
1209 - CM spoke with charge RN; it does not appear that patient has any CM needs at this time.      Marleni Cast, MARCO ANTONION, RN    Care Management  [phone]

## 2024-01-01 NOTE — PLAN OF CARE
Problem: Discharge Planning  Goal: Discharge to home or other facility with appropriate resources  Outcome: Adequate for Discharge  Flowsheets (Taken 1/1/2024 0800)  Discharge to home or other facility with appropriate resources: Identify barriers to discharge with patient and caregiver     Problem: Safety - Adult  Goal: Free from fall injury  Outcome: Adequate for Discharge     Problem: Pain  Goal: Verbalizes/displays adequate comfort level or baseline comfort level  Outcome: Adequate for Discharge

## 2024-01-01 NOTE — DISCHARGE SUMMARY
isosorbide mononitrate 30 MG extended release tablet  Commonly known as: IMDUR  Take 1 tablet by mouth daily  Start taking on: January 2, 2024            CONTINUE taking these medications      acetaminophen 500 MG tablet  Commonly known as: TYLENOL  Take 1 tablet by mouth every 6 hours as needed for Pain     aspirin 81 MG EC tablet     atorvastatin 80 MG tablet  Commonly known as: LIPITOR  TAKE 1 TABLET BY MOUTH EVERY DAY     carvedilol 25 MG tablet  Commonly known as: COREG     chlorthalidone 25 MG tablet  Commonly known as: HYGROTON  TAKE 1 TABLET BY MOUTH EVERY DAY     clopidogrel 75 MG tablet  Commonly known as: PLAVIX     cyanocobalamin 1000 MCG tablet     folic acid 1 MG tablet  Commonly known as: FOLVITE     furosemide 40 MG tablet  Commonly known as: LASIX  TAKE 1 TABLET BY MOUTH EVERY DAY     linaclotide 290 MCG Caps capsule  Commonly known as: LINZESS  TAKE 1 CAPSULE BY MOUTH EVERY DAY AS NEEDED     lubiprostone 24 MCG capsule  Commonly known as: AMITIZA     pantoprazole 40 MG tablet  Commonly known as: PROTONIX  TAKE 1 TABLET BY MOUTH EVERY DAY     potassium chloride 10 MEQ extended release capsule  Commonly known as: MICRO-K  TAKE 1 CAPSULE BY MOUTH DAILY  DAYS.            STOP taking these medications      hydrOXYzine HCl 10 MG tablet  Commonly known as: ATARAX     levoFLOXacin 750 MG tablet  Commonly known as: LEVAQUIN               Where to Get Your Medications        These medications were sent to [facility]/pharmacy [address] - [address] [facility] - P [phone] - F [phone]  [address] [facility] [address]      Phone: [phone]   isosorbide mononitrate 30 MG extended release tablet             DISPOSITION:    Home with Family:    x   Home with HH/PT/OT/RN:    SNF/LTC:    ARVIND:    OTHER:          Follow up with:   PCP : Maciel Laguna MD McGuiness, Krutika Z, APRN - NP  [address] [facility] [address] [phone]    Follow up in 1

## 2024-02-01 ENCOUNTER — HOSPITAL ENCOUNTER (OUTPATIENT)
Facility: HOSPITAL | Age: 64
Discharge: HOME | End: 2024-02-01
Attending: INTERNAL MEDICINE | Admitting: INTERNAL MEDICINE
Payer: COMMERCIAL

## 2024-02-01 ENCOUNTER — APPOINTMENT (OUTPATIENT)
Facility: HOSPITAL | Age: 64
End: 2024-02-01
Attending: INTERNAL MEDICINE
Payer: COMMERCIAL

## 2024-02-01 ENCOUNTER — TELEPHONE (OUTPATIENT)
Age: 64
End: 2024-02-01

## 2024-02-01 VITALS
OXYGEN SATURATION: 97 % | SYSTOLIC BLOOD PRESSURE: 153 MMHG | RESPIRATION RATE: 19 BRPM | HEART RATE: 65 BPM | WEIGHT: 295 LBS | DIASTOLIC BLOOD PRESSURE: 83 MMHG | BODY MASS INDEX: 39.1 KG/M2 | HEIGHT: 73 IN | TEMPERATURE: 97.7 F

## 2024-02-01 DIAGNOSIS — I25.110 ATHEROSCLEROTIC HEART DISEASE OF NATIVE CORONARY ARTERY WITH UNSTABLE ANGINA PECTORIS (HCC): ICD-10-CM

## 2024-02-01 DIAGNOSIS — I24.9 ACUTE CORONARY SYNDROME (HCC): Primary | ICD-10-CM

## 2024-02-01 DIAGNOSIS — I25.10 CORONARY ARTERIOSCLEROSIS IN NATIVE ARTERY: ICD-10-CM

## 2024-02-01 PROBLEM — Z01.810 PREOP CARDIOVASCULAR EXAM: Status: ACTIVE | Noted: 2024-02-01

## 2024-02-01 PROBLEM — I65.23 BILATERAL CAROTID ARTERY STENOSIS: Status: ACTIVE | Noted: 2024-02-01

## 2024-02-01 LAB
ABO + RH BLD: NORMAL
ALBUMIN SERPL-MCNC: 3.4 G/DL (ref 3.5–5)
ALBUMIN/GLOB SERPL: 0.9 (ref 1.1–2.2)
ALP SERPL-CCNC: 78 U/L (ref 45–117)
ALT SERPL-CCNC: 21 U/L (ref 12–78)
ANION GAP SERPL CALC-SCNC: 5 MMOL/L (ref 5–15)
APPEARANCE UR: CLEAR
APTT PPP: 28.5 SEC (ref 22.1–31)
AST SERPL-CCNC: 16 U/L (ref 15–37)
BACTERIA URNS QL MICRO: NEGATIVE /HPF
BILIRUB SERPL-MCNC: 0.5 MG/DL (ref 0.2–1)
BILIRUB UR QL: NEGATIVE
BLOOD GROUP ANTIBODIES SERPL: NORMAL
BUN SERPL-MCNC: 17 MG/DL (ref 6–20)
BUN/CREAT SERPL: 17 (ref 12–20)
CALCIUM SERPL-MCNC: 9.5 MG/DL (ref 8.5–10.1)
CHLORIDE SERPL-SCNC: 103 MMOL/L (ref 97–108)
CO2 SERPL-SCNC: 28 MMOL/L (ref 21–32)
COLOR UR: ABNORMAL
CREAT SERPL-MCNC: 1.02 MG/DL (ref 0.7–1.3)
ECHO BSA: 2.63 M2
EPITH CASTS URNS QL MICRO: ABNORMAL /LPF
ERYTHROCYTE [DISTWIDTH] IN BLOOD BY AUTOMATED COUNT: 14.4 % (ref 11.5–14.5)
FIBRINOGEN PPP-MCNC: 338 MG/DL (ref 200–475)
GLOBULIN SER CALC-MCNC: 3.8 G/DL (ref 2–4)
GLUCOSE BLD STRIP.AUTO-MCNC: 157 MG/DL (ref 65–117)
GLUCOSE SERPL-MCNC: 154 MG/DL (ref 65–100)
GLUCOSE UR STRIP.AUTO-MCNC: 100 MG/DL
HCT VFR BLD AUTO: 48.1 % (ref 36.6–50.3)
HGB BLD-MCNC: 15.4 G/DL (ref 12.1–17)
HGB UR QL STRIP: NEGATIVE
HYALINE CASTS URNS QL MICRO: ABNORMAL /LPF (ref 0–2)
INR PPP: 1.1 (ref 0.9–1.1)
KETONES UR QL STRIP.AUTO: NEGATIVE MG/DL
LEUKOCYTE ESTERASE UR QL STRIP.AUTO: NEGATIVE
MAGNESIUM SERPL-MCNC: 1.9 MG/DL (ref 1.6–2.4)
MCH RBC QN AUTO: 28.6 PG (ref 26–34)
MCHC RBC AUTO-ENTMCNC: 32 G/DL (ref 30–36.5)
MCV RBC AUTO: 89.2 FL (ref 80–99)
NITRITE UR QL STRIP.AUTO: NEGATIVE
NRBC # BLD: 0 K/UL (ref 0–0.01)
NRBC BLD-RTO: 0 PER 100 WBC
NT PRO BNP: 172 PG/ML
PH UR STRIP: 5 (ref 5–8)
PLATELET # BLD AUTO: 124 K/UL (ref 150–400)
POTASSIUM SERPL-SCNC: 3.3 MMOL/L (ref 3.5–5.1)
PROT SERPL-MCNC: 7.2 G/DL (ref 6.4–8.2)
PROT UR STRIP-MCNC: ABNORMAL MG/DL
PROTHROMBIN TIME: 11.1 SEC (ref 9–11.1)
RBC # BLD AUTO: 5.39 M/UL (ref 4.1–5.7)
RBC #/AREA URNS HPF: ABNORMAL /HPF (ref 0–5)
SERVICE CMNT-IMP: ABNORMAL
SODIUM SERPL-SCNC: 136 MMOL/L (ref 136–145)
SP GR UR REFRACTOMETRY: 1.02 (ref 1–1.03)
SPECIMEN EXP DATE BLD: NORMAL
THERAPEUTIC RANGE: NORMAL SECS (ref 58–77)
TSH SERPL DL<=0.05 MIU/L-ACNC: 0.31 UIU/ML (ref 0.36–3.74)
URINE CULTURE IF INDICATED: ABNORMAL
UROBILINOGEN UR QL STRIP.AUTO: 0.2 EU/DL (ref 0.2–1)
VAS LEFT ABI: 1.13
VAS LEFT ARM BP: 150 MMHG
VAS LEFT CCA DIST EDV: 17.6 CM/S
VAS LEFT CCA DIST PSV: 67.7 CM/S
VAS LEFT CCA PROX EDV: 16 CM/S
VAS LEFT CCA PROX PSV: 111.3 CM/S
VAS LEFT DORSALIS PEDIS BP: 156 MMHG
VAS LEFT ECA EDV: 10.25 CM/S
VAS LEFT ECA PSV: 53.6 CM/S
VAS LEFT GSV ANKLE DIAM: 1.5 MM
VAS LEFT GSV AT KNEE DIAM: 1.8 MM
VAS LEFT GSV BK MID DIAM: 1.5 MM
VAS LEFT GSV BK PROX DIAM: 1.3 MM
VAS LEFT GSV THIGH DIST DIAM: 1.6 MM
VAS LEFT GSV THIGH MID DIAM: 1.9 MM
VAS LEFT GSV THIGH PROX DIAM: 4.4 MM
VAS LEFT ICA DIST EDV: 37.1 CM/S
VAS LEFT ICA DIST PSV: 75.6 CM/S
VAS LEFT ICA MID EDV: 27.4 CM/S
VAS LEFT ICA MID PSV: 55.9 CM/S
VAS LEFT ICA PROX EDV: 21.9 CM/S
VAS LEFT ICA PROX PSV: 57 CM/S
VAS LEFT ICA/CCA PSV: 1.12 NO UNITS
VAS LEFT PTA BP: 169 MMHG
VAS LEFT SUBCLAVIAN PROX EDV: 0 CM/S
VAS LEFT SUBCLAVIAN PROX PSV: 122.9 CM/S
VAS LEFT TBI: 0.61
VAS LEFT TOE PRESSURE: 91 MMHG
VAS LEFT VERTEBRAL EDV: 27.99 CM/S
VAS LEFT VERTEBRAL PSV: 67.4 CM/S
VAS RIGHT ABI: 1.29
VAS RIGHT CCA DIST EDV: 0 CM/S
VAS RIGHT CCA DIST PSV: 59.6 CM/S
VAS RIGHT CCA PROX EDV: 0 CM/S
VAS RIGHT CCA PROX PSV: 85.4 CM/S
VAS RIGHT DORSALIS PEDIS BP: 184 MMHG
VAS RIGHT ECA EDV: 0 CM/S
VAS RIGHT ECA PSV: 43.9 CM/S
VAS RIGHT GSV ANKLE DIAM: 2 MM
VAS RIGHT GSV AT KNEE DIAM: 2.4 MM
VAS RIGHT GSV BK MID DIAM: 1.8 MM
VAS RIGHT GSV BK PROX DIAM: 2 MM
VAS RIGHT GSV THIGH DIST DIAM: 1.9 MM
VAS RIGHT GSV THIGH MID DIAM: 2.2 MM
VAS RIGHT GSV THIGH PROX DIAM: 4.8 MM
VAS RIGHT ICA DIST EDV: 0 CM/S
VAS RIGHT ICA DIST PSV: 0 CM/S
VAS RIGHT ICA MID EDV: 0 CM/S
VAS RIGHT ICA MID PSV: 0 CM/S
VAS RIGHT ICA PROX EDV: 0 CM/S
VAS RIGHT ICA PROX PSV: 0 CM/S
VAS RIGHT PTA BP: 194 MMHG
VAS RIGHT SUBCLAVIAN PROX EDV: 0 CM/S
VAS RIGHT SUBCLAVIAN PROX PSV: 71.6 CM/S
VAS RIGHT TBI: 0.88
VAS RIGHT TOE PRESSURE: 132 MMHG
VAS RIGHT VERTEBRAL EDV: 16.47 CM/S
VAS RIGHT VERTEBRAL PSV: 58.4 CM/S
WBC # BLD AUTO: 5.6 K/UL (ref 4.1–11.1)
WBC URNS QL MICRO: ABNORMAL /HPF (ref 0–4)

## 2024-02-01 PROCEDURE — 85384 FIBRINOGEN ACTIVITY: CPT

## 2024-02-01 PROCEDURE — 36415 COLL VENOUS BLD VENIPUNCTURE: CPT

## 2024-02-01 PROCEDURE — 86900 BLOOD TYPING SEROLOGIC ABO: CPT

## 2024-02-01 PROCEDURE — 86850 RBC ANTIBODY SCREEN: CPT

## 2024-02-01 PROCEDURE — 82962 GLUCOSE BLOOD TEST: CPT

## 2024-02-01 PROCEDURE — 83036 HEMOGLOBIN GLYCOSYLATED A1C: CPT

## 2024-02-01 PROCEDURE — C1894 INTRO/SHEATH, NON-LASER: HCPCS | Performed by: INTERNAL MEDICINE

## 2024-02-01 PROCEDURE — C1887 CATHETER, GUIDING: HCPCS | Performed by: INTERNAL MEDICINE

## 2024-02-01 PROCEDURE — 2709999900 HC NON-CHARGEABLE SUPPLY: Performed by: INTERNAL MEDICINE

## 2024-02-01 PROCEDURE — 2500000003 HC RX 250 WO HCPCS: Performed by: INTERNAL MEDICINE

## 2024-02-01 PROCEDURE — 93880 EXTRACRANIAL BILAT STUDY: CPT

## 2024-02-01 PROCEDURE — C1769 GUIDE WIRE: HCPCS | Performed by: INTERNAL MEDICINE

## 2024-02-01 PROCEDURE — 85027 COMPLETE CBC AUTOMATED: CPT

## 2024-02-01 PROCEDURE — 6360000004 HC RX CONTRAST MEDICATION: Performed by: INTERNAL MEDICINE

## 2024-02-01 PROCEDURE — 84443 ASSAY THYROID STIM HORMONE: CPT

## 2024-02-01 PROCEDURE — 76937 US GUIDE VASCULAR ACCESS: CPT | Performed by: INTERNAL MEDICINE

## 2024-02-01 PROCEDURE — 85610 PROTHROMBIN TIME: CPT

## 2024-02-01 PROCEDURE — 83735 ASSAY OF MAGNESIUM: CPT

## 2024-02-01 PROCEDURE — 86901 BLOOD TYPING SEROLOGIC RH(D): CPT

## 2024-02-01 PROCEDURE — 83880 ASSAY OF NATRIURETIC PEPTIDE: CPT

## 2024-02-01 PROCEDURE — 93970 EXTREMITY STUDY: CPT

## 2024-02-01 PROCEDURE — 99152 MOD SED SAME PHYS/QHP 5/>YRS: CPT | Performed by: INTERNAL MEDICINE

## 2024-02-01 PROCEDURE — 6360000002 HC RX W HCPCS: Performed by: INTERNAL MEDICINE

## 2024-02-01 PROCEDURE — 81001 URINALYSIS AUTO W/SCOPE: CPT

## 2024-02-01 PROCEDURE — 99153 MOD SED SAME PHYS/QHP EA: CPT | Performed by: INTERNAL MEDICINE

## 2024-02-01 PROCEDURE — 93458 L HRT ARTERY/VENTRICLE ANGIO: CPT | Performed by: INTERNAL MEDICINE

## 2024-02-01 PROCEDURE — 93922 UPR/L XTREMITY ART 2 LEVELS: CPT

## 2024-02-01 PROCEDURE — 80053 COMPREHEN METABOLIC PANEL: CPT

## 2024-02-01 PROCEDURE — 85730 THROMBOPLASTIN TIME PARTIAL: CPT

## 2024-02-01 RX ORDER — HEPARIN SODIUM 1000 [USP'U]/ML
INJECTION, SOLUTION INTRAVENOUS; SUBCUTANEOUS PRN
Status: DISCONTINUED | OUTPATIENT
Start: 2024-02-01 | End: 2024-02-01 | Stop reason: HOSPADM

## 2024-02-01 RX ORDER — LIDOCAINE HYDROCHLORIDE 10 MG/ML
INJECTION, SOLUTION INFILTRATION; PERINEURAL PRN
Status: DISCONTINUED | OUTPATIENT
Start: 2024-02-01 | End: 2024-02-01 | Stop reason: HOSPADM

## 2024-02-01 RX ORDER — FENTANYL CITRATE 50 UG/ML
INJECTION, SOLUTION INTRAMUSCULAR; INTRAVENOUS PRN
Status: DISCONTINUED | OUTPATIENT
Start: 2024-02-01 | End: 2024-02-01 | Stop reason: HOSPADM

## 2024-02-01 RX ORDER — VERAPAMIL HYDROCHLORIDE 2.5 MG/ML
INJECTION, SOLUTION INTRAVENOUS PRN
Status: DISCONTINUED | OUTPATIENT
Start: 2024-02-01 | End: 2024-02-01 | Stop reason: HOSPADM

## 2024-02-01 NOTE — TELEPHONE ENCOUNTER
I called patient's mobile number to let him know that we rescheduled his office consult with Dr. Tomlin from 2/15/24 to 2/5/24 at 2pm.    Patient will call back, he didn't have pen to write down information.

## 2024-02-01 NOTE — PROGRESS NOTES
Patent walked the alonso of recovery area. No signs or symptoms of SOB or distress. I have reviewed discharge instructions with the patient.  The patient verbalized understanding.    Discharge medications reviewed with patient and appropriate educational materials regarding medications and side effects teaching were provided.    Site care instructions reviewed with patient. Pain management teaching completed.    Patient instructed to make follow up appointments per discharge instructions.     Patient belongings packed up and accounted for with patient and family. All patient belongings sent home with patient.    Telemetry monitor and wires removed      Patient signed discharge instructions after reviewing them, and duplicate copy placed in chart.

## 2024-02-01 NOTE — PROGRESS NOTES
Dual Skin preformed with MEGHNA Walter  Cardiac Cath Lab Recovery Arrival Note:      Rashad Richter arrived to Cardiac Cath Lab, Recovery Area. Staff introduced to patient. Patient identifiers verified with NAME and DATE OF BIRTH. Procedure verified with patient. Consent forms reviewed and signed by patient or authorized representative and verified. Allergies verified.     Patient and family oriented to department. Patient and family informed of procedure and plan of care.     Questions answered with review. Patient prepped for procedure, per orders from physician, prior to arrival.    Patient on cardiac monitor, non-invasive blood pressure, SPO2 monitor. On RA. Patient is A&Ox 4. Patient reports no pain.     Patient in stretcher, in low position, with side rails up, call bell within reach, patient instructed to call if assistance as needed.    Patient prep in: [facility] Recovery Area, [address].   Patient family has pager #   Family in: vera.   Prep by: mellissa

## 2024-02-01 NOTE — CARDIO/PULMONARY
Chart reviewed: Patient is 63 y.o. male admitted with Atherosclerotic heart disease of native coronary artery with unstable angina pectoris (HCC) [I25.110]    Cardiac cath 2/1/24 CTS consult    Susannah Sultana RN

## 2024-02-01 NOTE — PROGRESS NOTES
TRANSFER - IN REPORT:    Verbal report received from leeroy on Rashad Richter  being received from [facility] for routine care. Report consisted of patient’s Situation, Background, Assessment and Recommendations(SBAR). Information from the following report(s) procedure log was reviewed with the receiving clinician. Opportunity for questions and clarification was provided. Assessment completed upon patient’s arrival to Cardiac Cath Lab RECOVERY AREA and care assumed.       Cardiac Cath Lab Recovery Arrival Note:    Rashad Richter arrived to [facility] recovery area.  Patient procedure= LHC. Patient on cardiac monitor, non-invasive blood pressure, SPO2 monitor. On RAPatient status doing well without problems. Patient is A&Ox 4. Patient reports no pain.    PROCEDURE SITE CHECK:    Procedure site:without any bleeding and no hematoma, no pain/discomfort reported at procedure site.     No change in patient status. Continue to monitor patient and status.

## 2024-02-02 LAB
EKG ATRIAL RATE: 62 BPM
EKG DIAGNOSIS: NORMAL
EKG P AXIS: 59 DEGREES
EKG P-R INTERVAL: 202 MS
EKG Q-T INTERVAL: 404 MS
EKG QRS DURATION: 118 MS
EKG QTC CALCULATION (BAZETT): 410 MS
EKG R AXIS: -36 DEGREES
EKG T AXIS: 41 DEGREES
EKG VENTRICULAR RATE: 62 BPM
EST. AVERAGE GLUCOSE BLD GHB EST-MCNC: 189 MG/DL
HBA1C MFR BLD: 8.2 % (ref 4–5.6)

## 2024-02-06 ENCOUNTER — PREP FOR PROCEDURE (OUTPATIENT)
Age: 64
End: 2024-02-06

## 2024-02-06 ENCOUNTER — INITIAL CONSULT (OUTPATIENT)
Age: 64
End: 2024-02-06
Payer: COMMERCIAL

## 2024-02-06 VITALS
OXYGEN SATURATION: 96 % | RESPIRATION RATE: 18 BRPM | SYSTOLIC BLOOD PRESSURE: 128 MMHG | WEIGHT: 296 LBS | DIASTOLIC BLOOD PRESSURE: 74 MMHG | BODY MASS INDEX: 39.05 KG/M2 | TEMPERATURE: 98.1 F | HEART RATE: 62 BPM

## 2024-02-06 DIAGNOSIS — I25.10 CORONARY ARTERY DISEASE INVOLVING NATIVE CORONARY ARTERY OF NATIVE HEART WITHOUT ANGINA PECTORIS: Primary | ICD-10-CM

## 2024-02-06 DIAGNOSIS — I25.10 DISEASE OF CARDIOVASCULAR SYSTEM: ICD-10-CM

## 2024-02-06 DIAGNOSIS — I25.10 CORONARY ARTERIOSCLEROSIS IN NATIVE ARTERY: Primary | ICD-10-CM

## 2024-02-06 DIAGNOSIS — Z01.810 PREOP CARDIOVASCULAR EXAM: ICD-10-CM

## 2024-02-06 DIAGNOSIS — I50.21 ACUTE SYSTOLIC CONGESTIVE HEART FAILURE (HCC): ICD-10-CM

## 2024-02-06 DIAGNOSIS — Z87.891 FORMER SMOKER: ICD-10-CM

## 2024-02-06 PROCEDURE — 99205 OFFICE O/P NEW HI 60 MIN: CPT | Performed by: NURSE PRACTITIONER

## 2024-02-06 PROCEDURE — 3078F DIAST BP <80 MM HG: CPT | Performed by: THORACIC SURGERY (CARDIOTHORACIC VASCULAR SURGERY)

## 2024-02-06 PROCEDURE — 3074F SYST BP LT 130 MM HG: CPT | Performed by: NURSE PRACTITIONER

## 2024-02-06 RX ORDER — HYDROXYZINE HYDROCHLORIDE 10 MG/1
10 TABLET, FILM COATED ORAL 2 TIMES DAILY PRN
COMMUNITY

## 2024-02-06 RX ORDER — NITROGLYCERIN 0.4 MG/1
0.4 TABLET SUBLINGUAL EVERY 5 MIN PRN
COMMUNITY

## 2024-02-06 RX ORDER — EZETIMIBE 10 MG/1
10 TABLET ORAL DAILY
COMMUNITY

## 2024-02-06 RX ORDER — AMLODIPINE BESYLATE 5 MG/1
5 TABLET ORAL DAILY
COMMUNITY

## 2024-02-06 ASSESSMENT — PATIENT HEALTH QUESTIONNAIRE - PHQ9
SUM OF ALL RESPONSES TO PHQ QUESTIONS 1-9: 0
1. LITTLE INTEREST OR PLEASURE IN DOING THINGS: 0
SUM OF ALL RESPONSES TO PHQ9 QUESTIONS 1 & 2: 0
2. FEELING DOWN, DEPRESSED OR HOPELESS: 0

## 2024-02-06 NOTE — PROGRESS NOTES
Rashad Richter reported today for the following:    Chief Complaint   Patient presents with    Coronary Artery Disease    Surgical Consult       Reviewed record in preparation for visit and have obtained necessary documentation. Identified pt with two pt identifiers (name and ).     1. Have you been to the ER, urgent care clinic since your last visit?  Hospitalized since your last visit?No    2. Have you seen or consulted any other health care providers outside of the [facility] since your last visit?  Include any pap smears or colon screening. No    /74   Pulse 62   Temp 98.1 °F (36.7 °C) (Oral)   Resp 18   Wt 134.3 kg (296 lb)   SpO2 96%   BMI 39.05 kg/m²     Medications reviewed/approved by provider.    No results found for this visit on 24.         Nadia Torres RN    
process. Instructed to stop plavix now. Will plan for CABG with EVH and possible radial artery harvest on 2/14/24. Pt will need to preadmit for insulin gtt on 2/13/24. Need's everett's test on admission.  Acute on chronic HFrEF (NYHA class III): EF 45%-50 % on echo 12/2023, EF 30 on LV gram. On coreg, lasix, pBNP 172.   DM type II: A1C 8.2. Will need to preadmit for insulin gtt. Diabetes management consult in the hospital. Diabetes is a new diagnosis. Discussed today with the patient.   HTN: on coreg, imdur, lasix  HLD: on statin  Thrombocytopenia: Plt low chronically, up to 124 on most recent labs  Carotid stenosis: Occluded right internal carotid artery, mild Left. Has seen vascular surgery.  GERD: On protonix  Constipation: On linzess  Hyperthyroidism: TSH 0.31, will need to check T3,T4   TIA: Pt reports hx of TIA, he also reports occasional numbness on the entire right side of his body that comes on \"randomly\" and lasts for a few minutes.   Obesity: Weight loss counseling when appropriate      Signed By: Ann Perez, APRN - NP     February 6, 2024

## 2024-02-08 ENCOUNTER — TELEPHONE (OUTPATIENT)
Facility: CLINIC | Age: 64
End: 2024-02-08

## 2024-02-08 ENCOUNTER — HOSPITAL ENCOUNTER (OUTPATIENT)
Facility: HOSPITAL | Age: 64
Discharge: HOME | End: 2024-02-08
Payer: COMMERCIAL

## 2024-02-08 DIAGNOSIS — I25.10 CORONARY ARTERIOSCLEROSIS IN NATIVE ARTERY: ICD-10-CM

## 2024-02-08 PROCEDURE — 94729 DIFFUSING CAPACITY: CPT

## 2024-02-08 PROCEDURE — 94726 PLETHYSMOGRAPHY LUNG VOLUMES: CPT

## 2024-02-08 PROCEDURE — 94060 EVALUATION OF WHEEZING: CPT

## 2024-02-13 ENCOUNTER — HOSPITAL ENCOUNTER (INPATIENT)
Facility: HOSPITAL | Age: 64
LOS: 6 days | Discharge: HOME HEALTH | DRG: 235 | End: 2024-02-19
Attending: THORACIC SURGERY (CARDIOTHORACIC VASCULAR SURGERY) | Admitting: THORACIC SURGERY (CARDIOTHORACIC VASCULAR SURGERY)
Payer: MEDICARE

## 2024-02-13 ENCOUNTER — APPOINTMENT (OUTPATIENT)
Facility: HOSPITAL | Age: 64
DRG: 235 | End: 2024-02-13
Attending: THORACIC SURGERY (CARDIOTHORACIC VASCULAR SURGERY)
Payer: MEDICARE

## 2024-02-13 ENCOUNTER — ANESTHESIA EVENT (OUTPATIENT)
Facility: HOSPITAL | Age: 64
End: 2024-02-13
Payer: MEDICARE

## 2024-02-13 DIAGNOSIS — Z95.1 S/P CABG X 2: ICD-10-CM

## 2024-02-13 DIAGNOSIS — I25.10 DISEASE OF CARDIOVASCULAR SYSTEM: Primary | ICD-10-CM

## 2024-02-13 DIAGNOSIS — E11.65 TYPE 2 DIABETES MELLITUS WITH HYPERGLYCEMIA (HCC): ICD-10-CM

## 2024-02-13 DIAGNOSIS — I25.10 CAD IN NATIVE ARTERY: ICD-10-CM

## 2024-02-13 PROBLEM — E11.9 DIABETES MELLITUS, NEW ONSET (HCC): Status: ACTIVE | Noted: 2024-02-13

## 2024-02-13 LAB
ALBUMIN SERPL-MCNC: 3.2 G/DL (ref 3.5–5)
ALBUMIN/GLOB SERPL: 0.8 (ref 1.1–2.2)
ALP SERPL-CCNC: 85 U/L (ref 45–117)
ALT SERPL-CCNC: 23 U/L (ref 12–78)
ANION GAP SERPL CALC-SCNC: 5 MMOL/L (ref 5–15)
APTT PPP: 25.9 SEC (ref 22.1–31)
ARTERIAL PATENCY WRIST A: YES
AST SERPL-CCNC: 9 U/L (ref 15–37)
BASE EXCESS BLDA CALC-SCNC: 5.1 MMOL/L
BASOPHILS # BLD: 0 K/UL (ref 0–0.1)
BASOPHILS NFR BLD: 0 % (ref 0–1)
BDY SITE: ABNORMAL
BILIRUB SERPL-MCNC: 0.5 MG/DL (ref 0.2–1)
BUN SERPL-MCNC: 16 MG/DL (ref 6–20)
BUN/CREAT SERPL: 14 (ref 12–20)
CALCIUM SERPL-MCNC: 9.6 MG/DL (ref 8.5–10.1)
CHLORIDE SERPL-SCNC: 105 MMOL/L (ref 97–108)
CO2 SERPL-SCNC: 31 MMOL/L (ref 21–32)
COMMENT:: NORMAL
CREAT SERPL-MCNC: 1.16 MG/DL (ref 0.7–1.3)
DIFFERENTIAL METHOD BLD: ABNORMAL
EKG ATRIAL RATE: 65 BPM
EKG DIAGNOSIS: NORMAL
EKG P AXIS: 48 DEGREES
EKG P-R INTERVAL: 196 MS
EKG Q-T INTERVAL: 400 MS
EKG QRS DURATION: 124 MS
EKG QTC CALCULATION (BAZETT): 416 MS
EKG R AXIS: -24 DEGREES
EKG T AXIS: 21 DEGREES
EKG VENTRICULAR RATE: 65 BPM
EOSINOPHIL # BLD: 0.1 K/UL (ref 0–0.4)
EOSINOPHIL NFR BLD: 1 % (ref 0–7)
ERYTHROCYTE [DISTWIDTH] IN BLOOD BY AUTOMATED COUNT: 14 % (ref 11.5–14.5)
GLOBULIN SER CALC-MCNC: 4 G/DL (ref 2–4)
GLUCOSE BLD STRIP.AUTO-MCNC: 105 MG/DL (ref 65–117)
GLUCOSE BLD STRIP.AUTO-MCNC: 105 MG/DL (ref 65–117)
GLUCOSE BLD STRIP.AUTO-MCNC: 130 MG/DL (ref 65–117)
GLUCOSE BLD STRIP.AUTO-MCNC: 131 MG/DL (ref 65–117)
GLUCOSE BLD STRIP.AUTO-MCNC: 140 MG/DL (ref 65–117)
GLUCOSE BLD STRIP.AUTO-MCNC: 156 MG/DL (ref 65–117)
GLUCOSE BLD STRIP.AUTO-MCNC: 181 MG/DL (ref 65–117)
GLUCOSE BLD STRIP.AUTO-MCNC: 186 MG/DL (ref 65–117)
GLUCOSE BLD STRIP.AUTO-MCNC: 203 MG/DL (ref 65–117)
GLUCOSE BLD STRIP.AUTO-MCNC: 205 MG/DL (ref 65–117)
GLUCOSE BLD STRIP.AUTO-MCNC: 93 MG/DL (ref 65–117)
GLUCOSE SERPL-MCNC: 243 MG/DL (ref 65–100)
HCO3 BLDA-SCNC: 29 MMOL/L (ref 22–26)
HCT VFR BLD AUTO: 44.1 % (ref 36.6–50.3)
HGB BLD-MCNC: 13.9 G/DL (ref 12.1–17)
HISTORY CHECK: NORMAL
IMM GRANULOCYTES # BLD AUTO: 0 K/UL (ref 0–0.04)
IMM GRANULOCYTES NFR BLD AUTO: 0 % (ref 0–0.5)
INR PPP: 1.1 (ref 0.9–1.1)
LYMPHOCYTES # BLD: 1.2 K/UL (ref 0.8–3.5)
LYMPHOCYTES NFR BLD: 21 % (ref 12–49)
MAGNESIUM SERPL-MCNC: 1.9 MG/DL (ref 1.6–2.4)
MCH RBC QN AUTO: 28.3 PG (ref 26–34)
MCHC RBC AUTO-ENTMCNC: 31.5 G/DL (ref 30–36.5)
MCV RBC AUTO: 89.6 FL (ref 80–99)
MONOCYTES # BLD: 0.6 K/UL (ref 0–1)
MONOCYTES NFR BLD: 10 % (ref 5–13)
NEUTS SEG # BLD: 3.6 K/UL (ref 1.8–8)
NEUTS SEG NFR BLD: 67 % (ref 32–75)
NRBC # BLD: 0 K/UL (ref 0–0.01)
NRBC BLD-RTO: 0 PER 100 WBC
PCO2 BLDA: 42 MMHG (ref 35–45)
PH BLDA: 7.47 (ref 7.35–7.45)
PLATELET # BLD AUTO: 114 K/UL (ref 150–400)
PO2 BLDA: 99 MMHG (ref 80–100)
POTASSIUM SERPL-SCNC: 3.3 MMOL/L (ref 3.5–5.1)
PROT SERPL-MCNC: 7.2 G/DL (ref 6.4–8.2)
PROTHROMBIN TIME: 11 SEC (ref 9–11.1)
RBC # BLD AUTO: 4.92 M/UL (ref 4.1–5.7)
SAO2 % BLD: 98 % (ref 92–97)
SAO2% DEVICE SAO2% SENSOR NAME: ABNORMAL
SERVICE CMNT-IMP: ABNORMAL
SERVICE CMNT-IMP: NORMAL
SODIUM SERPL-SCNC: 141 MMOL/L (ref 136–145)
SPECIMEN HOLD: NORMAL
SPECIMEN SITE: ABNORMAL
THERAPEUTIC RANGE: NORMAL SECS (ref 58–77)
TSH SERPL DL<=0.05 MIU/L-ACNC: 0.29 UIU/ML (ref 0.36–3.74)
WBC # BLD AUTO: 5.4 K/UL (ref 4.1–11.1)

## 2024-02-13 PROCEDURE — 83036 HEMOGLOBIN GLYCOSYLATED A1C: CPT

## 2024-02-13 PROCEDURE — 36415 COLL VENOUS BLD VENIPUNCTURE: CPT

## 2024-02-13 PROCEDURE — 6370000000 HC RX 637 (ALT 250 FOR IP): Performed by: THORACIC SURGERY (CARDIOTHORACIC VASCULAR SURGERY)

## 2024-02-13 PROCEDURE — 2580000003 HC RX 258: Performed by: NURSE PRACTITIONER

## 2024-02-13 PROCEDURE — 93010 ELECTROCARDIOGRAM REPORT: CPT | Performed by: STUDENT IN AN ORGANIZED HEALTH CARE EDUCATION/TRAINING PROGRAM

## 2024-02-13 PROCEDURE — 99221 1ST HOSP IP/OBS SF/LOW 40: CPT

## 2024-02-13 PROCEDURE — 80053 COMPREHEN METABOLIC PANEL: CPT

## 2024-02-13 PROCEDURE — 6370000000 HC RX 637 (ALT 250 FOR IP): Performed by: NURSE PRACTITIONER

## 2024-02-13 PROCEDURE — 86923 COMPATIBILITY TEST ELECTRIC: CPT

## 2024-02-13 PROCEDURE — 86850 RBC ANTIBODY SCREEN: CPT

## 2024-02-13 PROCEDURE — 85730 THROMBOPLASTIN TIME PARTIAL: CPT

## 2024-02-13 PROCEDURE — 2000000000 HC ICU R&B

## 2024-02-13 PROCEDURE — 85610 PROTHROMBIN TIME: CPT

## 2024-02-13 PROCEDURE — 86900 BLOOD TYPING SEROLOGIC ABO: CPT

## 2024-02-13 PROCEDURE — 36600 WITHDRAWAL OF ARTERIAL BLOOD: CPT

## 2024-02-13 PROCEDURE — 86901 BLOOD TYPING SEROLOGIC RH(D): CPT

## 2024-02-13 PROCEDURE — 93922 UPR/L XTREMITY ART 2 LEVELS: CPT

## 2024-02-13 PROCEDURE — 83735 ASSAY OF MAGNESIUM: CPT

## 2024-02-13 PROCEDURE — 82803 BLOOD GASES ANY COMBINATION: CPT

## 2024-02-13 PROCEDURE — 93005 ELECTROCARDIOGRAM TRACING: CPT | Performed by: NURSE PRACTITIONER

## 2024-02-13 PROCEDURE — 71046 X-RAY EXAM CHEST 2 VIEWS: CPT

## 2024-02-13 PROCEDURE — 84443 ASSAY THYROID STIM HORMONE: CPT

## 2024-02-13 PROCEDURE — 82962 GLUCOSE BLOOD TEST: CPT

## 2024-02-13 PROCEDURE — 85025 COMPLETE CBC W/AUTO DIFF WBC: CPT

## 2024-02-13 RX ORDER — SIMETHICONE 80 MG
80 TABLET,CHEWABLE ORAL EVERY 6 HOURS PRN
Status: DISCONTINUED | OUTPATIENT
Start: 2024-02-13 | End: 2024-02-14

## 2024-02-13 RX ORDER — CARVEDILOL 12.5 MG/1
25 TABLET ORAL 2 TIMES DAILY
Status: DISCONTINUED | OUTPATIENT
Start: 2024-02-13 | End: 2024-02-14

## 2024-02-13 RX ORDER — POTASSIUM CHLORIDE 20 MEQ/1
40 TABLET, EXTENDED RELEASE ORAL PRN
Status: DISCONTINUED | OUTPATIENT
Start: 2024-02-13 | End: 2024-02-14

## 2024-02-13 RX ORDER — ACETAMINOPHEN 650 MG/1
650 SUPPOSITORY RECTAL EVERY 6 HOURS PRN
Status: DISCONTINUED | OUTPATIENT
Start: 2024-02-13 | End: 2024-02-14

## 2024-02-13 RX ORDER — FOLIC ACID 1 MG/1
1 TABLET ORAL DAILY
Status: DISCONTINUED | OUTPATIENT
Start: 2024-02-13 | End: 2024-02-13

## 2024-02-13 RX ORDER — CARVEDILOL 12.5 MG/1
25 TABLET ORAL 2 TIMES DAILY
Status: DISCONTINUED | OUTPATIENT
Start: 2024-02-13 | End: 2024-02-13

## 2024-02-13 RX ORDER — ACETAMINOPHEN 325 MG/1
650 TABLET ORAL EVERY 6 HOURS PRN
Status: DISCONTINUED | OUTPATIENT
Start: 2024-02-13 | End: 2024-02-14

## 2024-02-13 RX ORDER — HYDROXYZINE HYDROCHLORIDE 10 MG/1
10 TABLET, FILM COATED ORAL 2 TIMES DAILY PRN
Status: DISCONTINUED | OUTPATIENT
Start: 2024-02-13 | End: 2024-02-14

## 2024-02-13 RX ORDER — POTASSIUM CHLORIDE 7.45 MG/ML
10 INJECTION INTRAVENOUS PRN
Status: DISCONTINUED | OUTPATIENT
Start: 2024-02-13 | End: 2024-02-14

## 2024-02-13 RX ORDER — CHLORHEXIDINE GLUCONATE ORAL RINSE 1.2 MG/ML
15 SOLUTION DENTAL 2 TIMES DAILY
Status: DISCONTINUED | OUTPATIENT
Start: 2024-02-13 | End: 2024-02-19 | Stop reason: HOSPADM

## 2024-02-13 RX ORDER — ATORVASTATIN CALCIUM 40 MG/1
80 TABLET, FILM COATED ORAL DAILY
Status: DISCONTINUED | OUTPATIENT
Start: 2024-02-14 | End: 2024-02-19 | Stop reason: HOSPADM

## 2024-02-13 RX ORDER — ONDANSETRON 4 MG/1
4 TABLET, ORALLY DISINTEGRATING ORAL EVERY 8 HOURS PRN
Status: DISCONTINUED | OUTPATIENT
Start: 2024-02-13 | End: 2024-02-14

## 2024-02-13 RX ORDER — DEXTROSE MONOHYDRATE 100 MG/ML
INJECTION, SOLUTION INTRAVENOUS CONTINUOUS PRN
Status: DISCONTINUED | OUTPATIENT
Start: 2024-02-13 | End: 2024-02-14

## 2024-02-13 RX ORDER — SODIUM CHLORIDE 0.9 % (FLUSH) 0.9 %
5-40 SYRINGE (ML) INJECTION PRN
Status: DISCONTINUED | OUTPATIENT
Start: 2024-02-13 | End: 2024-02-14

## 2024-02-13 RX ORDER — SODIUM CHLORIDE 0.9 % (FLUSH) 0.9 %
5-40 SYRINGE (ML) INJECTION EVERY 8 HOURS
Status: DISCONTINUED | OUTPATIENT
Start: 2024-02-13 | End: 2024-02-14

## 2024-02-13 RX ORDER — ISOSORBIDE MONONITRATE 30 MG/1
30 TABLET, EXTENDED RELEASE ORAL DAILY
Status: DISCONTINUED | OUTPATIENT
Start: 2024-02-13 | End: 2024-02-13

## 2024-02-13 RX ORDER — ISOSORBIDE MONONITRATE 30 MG/1
30 TABLET, EXTENDED RELEASE ORAL DAILY
Status: DISCONTINUED | OUTPATIENT
Start: 2024-02-14 | End: 2024-02-14

## 2024-02-13 RX ORDER — ASPIRIN 81 MG/1
81 TABLET ORAL DAILY
Status: DISCONTINUED | OUTPATIENT
Start: 2024-02-13 | End: 2024-02-13

## 2024-02-13 RX ORDER — POLYETHYLENE GLYCOL 3350 17 G/17G
17 POWDER, FOR SOLUTION ORAL DAILY PRN
Status: DISCONTINUED | OUTPATIENT
Start: 2024-02-13 | End: 2024-02-14

## 2024-02-13 RX ORDER — POTASSIUM CHLORIDE 1.5 G/1.58G
40 POWDER, FOR SOLUTION ORAL PRN
Status: DISCONTINUED | OUTPATIENT
Start: 2024-02-13 | End: 2024-02-14

## 2024-02-13 RX ORDER — SODIUM CHLORIDE 9 MG/ML
INJECTION, SOLUTION INTRAVENOUS PRN
Status: DISCONTINUED | OUTPATIENT
Start: 2024-02-13 | End: 2024-02-14

## 2024-02-13 RX ORDER — FOLIC ACID 1 MG/1
1 TABLET ORAL DAILY
Status: DISCONTINUED | OUTPATIENT
Start: 2024-02-14 | End: 2024-02-14

## 2024-02-13 RX ORDER — ASPIRIN 81 MG/1
81 TABLET ORAL DAILY
Status: DISCONTINUED | OUTPATIENT
Start: 2024-02-14 | End: 2024-02-19 | Stop reason: HOSPADM

## 2024-02-13 RX ORDER — ONDANSETRON 2 MG/ML
4 INJECTION INTRAMUSCULAR; INTRAVENOUS EVERY 6 HOURS PRN
Status: DISCONTINUED | OUTPATIENT
Start: 2024-02-13 | End: 2024-02-14

## 2024-02-13 RX ORDER — SODIUM CHLORIDE 0.9 % (FLUSH) 0.9 %
5-40 SYRINGE (ML) INJECTION EVERY 12 HOURS SCHEDULED
Status: DISCONTINUED | OUTPATIENT
Start: 2024-02-13 | End: 2024-02-14

## 2024-02-13 RX ORDER — MAGNESIUM SULFATE IN WATER 40 MG/ML
2000 INJECTION, SOLUTION INTRAVENOUS PRN
Status: DISCONTINUED | OUTPATIENT
Start: 2024-02-13 | End: 2024-02-14

## 2024-02-13 RX ORDER — ATORVASTATIN CALCIUM 40 MG/1
80 TABLET, FILM COATED ORAL DAILY
Status: DISCONTINUED | OUTPATIENT
Start: 2024-02-13 | End: 2024-02-13

## 2024-02-13 RX ORDER — AMIODARONE HYDROCHLORIDE 200 MG/1
400 TABLET ORAL 2 TIMES DAILY
Status: DISCONTINUED | OUTPATIENT
Start: 2024-02-13 | End: 2024-02-14

## 2024-02-13 RX ADMIN — AMIODARONE HYDROCHLORIDE 400 MG: 200 TABLET ORAL at 21:08

## 2024-02-13 RX ADMIN — SIMETHICONE 80 MG: 80 TABLET, CHEWABLE ORAL at 23:06

## 2024-02-13 RX ADMIN — MUPIROCIN: 20 OINTMENT TOPICAL at 21:08

## 2024-02-13 RX ADMIN — SODIUM CHLORIDE, PRESERVATIVE FREE 10 ML: 5 INJECTION INTRAVENOUS at 13:28

## 2024-02-13 RX ADMIN — AMIODARONE HYDROCHLORIDE 400 MG: 200 TABLET ORAL at 13:38

## 2024-02-13 RX ADMIN — CARVEDILOL 25 MG: 12.5 TABLET, FILM COATED ORAL at 21:08

## 2024-02-13 RX ADMIN — POTASSIUM CHLORIDE 40 MEQ: 1500 TABLET, EXTENDED RELEASE ORAL at 16:38

## 2024-02-13 RX ADMIN — SODIUM CHLORIDE, PRESERVATIVE FREE 10 ML: 5 INJECTION INTRAVENOUS at 13:38

## 2024-02-13 RX ADMIN — 0.12% CHLORHEXIDINE GLUCONATE 15 ML: 1.2 RINSE ORAL at 13:37

## 2024-02-13 RX ADMIN — SODIUM CHLORIDE 2.86 UNITS/HR: 9 INJECTION, SOLUTION INTRAVENOUS at 13:18

## 2024-02-13 RX ADMIN — 0.12% CHLORHEXIDINE GLUCONATE 15 ML: 1.2 RINSE ORAL at 21:08

## 2024-02-13 RX ADMIN — SODIUM CHLORIDE: 9 INJECTION, SOLUTION INTRAVENOUS at 13:28

## 2024-02-13 NOTE — CONSENT
Informed Consent for Blood Component Transfusion Note    I have discussed with the patient the rationale for blood component transfusion; its benefits in treating or preventing fatigue, organ damage, or death; and its risk which includes mild transfusion reactions, rare risk of blood borne infection, or more serious but rare reactions. I have discussed the alternatives to transfusion, including the risk and consequences of not receiving transfusion. The patient had an opportunity to ask questions and had agreed to proceed with transfusion of blood components.    Electronically signed by TOM Allen NP on 2/13/24 at 2:35 PM EST

## 2024-02-13 NOTE — H&P
Cardiac Surgery  Consult Note    Subjective:   **information obtained from previous consult note - no change     Rashad Richter is a 63 y.o. male who was referred for cardiac  evaluation Dr. Molina for CAD. He has a medical history of CAD s/p stents 2018, 2022, 6/2023, HTN, HLD, TIA, carotid stenosis, GERD, constipation    He has had worsening CP with exertion and CP that wakes him up at night. This has been increasing in frequency. He also reports worsening SOB with exertion. Showering causes CP and SOB. Sits down and takes 5-10 minutes to go away. Going on for over a year. Increasing in frequency. He denies orthopnea, claudication, dizziness.     Right side goes numb occasionally. Goes away on it's own. Last a few minutes. Hx TIA 2017ish-     Vascular surgeon -Dr. Lal ([place]), no plans for intervention for carotid at this time.     Pt lives house with wife. He is a plant operater. He gets around independently without assist devices. He is a former smoker quit 2009 (smoked for 30 years off and on), drinks alcohol on the weekend with friends 4 beers and 3 mixed drinks, and denies illicit drug use. Pt has a significant family history of COPD.  He's had the COVID-19 vaccines.    Cardiac Testing  Cardiac catheterization:  2/1/24:   Diagnostic  Dominance: Right  Left Main   Dist LM lesion, 40% stenosed.      Left Anterior Descending   Prox LAD lesion, 95% stenosed. The lesion was previously treated. Previous stent has restenosis present.      Left Circumflex   Mid Cx lesion, 80% stenosed.      Right Coronary Artery   Prox RCA lesion, 50% stenosed.      Intervention     No interventions have been documented.     Left Heart    Left Ventricle The left ventricle is mildly dilated. There is moderate left ventricular systolic dysfunction. LV end diastolic pressure is normal. LV EDP is: 10. The estimated EF = 30 - 35%.       ECHO:          Past Medical History:   Diagnosis Date    Anemia     iron and blood

## 2024-02-14 ENCOUNTER — ANESTHESIA (OUTPATIENT)
Facility: HOSPITAL | Age: 64
End: 2024-02-14
Payer: MEDICARE

## 2024-02-14 ENCOUNTER — HOSPITAL ENCOUNTER (INPATIENT)
Facility: HOSPITAL | Age: 64
Discharge: HOME | DRG: 235 | End: 2024-02-16
Attending: THORACIC SURGERY (CARDIOTHORACIC VASCULAR SURGERY)
Payer: MEDICARE

## 2024-02-14 ENCOUNTER — APPOINTMENT (OUTPATIENT)
Facility: HOSPITAL | Age: 64
DRG: 235 | End: 2024-02-14
Attending: THORACIC SURGERY (CARDIOTHORACIC VASCULAR SURGERY)
Payer: MEDICARE

## 2024-02-14 PROBLEM — Z95.1 S/P CABG X 2: Status: ACTIVE | Noted: 2024-02-14

## 2024-02-14 LAB
ABO + RH BLD: NORMAL
ACUTE KIDNEY INJURY RISK NEPHROCHECK: 3.52 (ref 0–0.3)
ALBUMIN SERPL-MCNC: 3.3 G/DL (ref 3.5–5)
ALBUMIN SERPL-MCNC: 3.7 G/DL (ref 3.5–5)
ALBUMIN/GLOB SERPL: 1.1 (ref 1.1–2.2)
ALBUMIN/GLOB SERPL: 1.2 (ref 1.1–2.2)
ALP SERPL-CCNC: 66 U/L (ref 45–117)
ALP SERPL-CCNC: 68 U/L (ref 45–117)
ALT SERPL-CCNC: 19 U/L (ref 12–78)
ALT SERPL-CCNC: 19 U/L (ref 12–78)
ANION GAP SERPL CALC-SCNC: 3 MMOL/L (ref 5–15)
ANION GAP SERPL CALC-SCNC: 7 MMOL/L (ref 5–15)
APTT PPP: 25.2 SEC (ref 22.1–31)
ARTERIAL PATENCY WRIST A: ABNORMAL
AST SERPL-CCNC: 28 U/L (ref 15–37)
AST SERPL-CCNC: 29 U/L (ref 15–37)
BASE DEFICIT BLDA-SCNC: 1.6 MMOL/L
BASE DEFICIT BLDA-SCNC: 2.1 MMOL/L
BASE DEFICIT BLDA-SCNC: 4.8 MMOL/L
BASE EXCESS BLDA CALC-SCNC: 0.7 MMOL/L
BDY SITE: ABNORMAL
BILIRUB SERPL-MCNC: 0.8 MG/DL (ref 0.2–1)
BILIRUB SERPL-MCNC: 0.8 MG/DL (ref 0.2–1)
BLD PROD TYP BPU: NORMAL
BLD PROD TYP BPU: NORMAL
BLOOD BANK DISPENSE STATUS: NORMAL
BLOOD BANK DISPENSE STATUS: NORMAL
BLOOD GROUP ANTIBODIES SERPL: NORMAL
BPU ID: NORMAL
BPU ID: NORMAL
BREATHS.SPONTANEOUS ON VENT: 20
BREATHS.SPONTANEOUS ON VENT: 25
BUN SERPL-MCNC: 14 MG/DL (ref 6–20)
BUN SERPL-MCNC: 17 MG/DL (ref 6–20)
BUN/CREAT SERPL: 12 (ref 12–20)
BUN/CREAT SERPL: 13 (ref 12–20)
CA-I BLD-SCNC: 1.2 MMOL/L (ref 1.13–1.32)
CALCIUM SERPL-MCNC: 8.9 MG/DL (ref 8.5–10.1)
CALCIUM SERPL-MCNC: 9.2 MG/DL (ref 8.5–10.1)
CHLORIDE SERPL-SCNC: 107 MMOL/L (ref 97–108)
CHLORIDE SERPL-SCNC: 109 MMOL/L (ref 97–108)
CO2 SERPL-SCNC: 27 MMOL/L (ref 21–32)
CO2 SERPL-SCNC: 28 MMOL/L (ref 21–32)
CREAT SERPL-MCNC: 1.16 MG/DL (ref 0.7–1.3)
CREAT SERPL-MCNC: 1.34 MG/DL (ref 0.7–1.3)
CROSSMATCH RESULT: NORMAL
CROSSMATCH RESULT: NORMAL
ECHO BSA: 2.6 M2
EKG ATRIAL RATE: 74 BPM
EKG DIAGNOSIS: NORMAL
EKG P AXIS: 64 DEGREES
EKG P-R INTERVAL: 186 MS
EKG Q-T INTERVAL: 426 MS
EKG QRS DURATION: 112 MS
EKG QTC CALCULATION (BAZETT): 472 MS
EKG R AXIS: -31 DEGREES
EKG T AXIS: -65 DEGREES
EKG VENTRICULAR RATE: 74 BPM
ERYTHROCYTE [DISTWIDTH] IN BLOOD BY AUTOMATED COUNT: 13.8 % (ref 11.5–14.5)
ERYTHROCYTE [DISTWIDTH] IN BLOOD BY AUTOMATED COUNT: 14 % (ref 11.5–14.5)
EST. AVERAGE GLUCOSE BLD GHB EST-MCNC: 197 MG/DL
FIO2 ON VENT: 40 %
FIO2 ON VENT: 40 %
FIO2 ON VENT: 50 %
GAS FLOW.O2 SETTING OXYMISER: 14
GLOBULIN SER CALC-MCNC: 2.9 G/DL (ref 2–4)
GLOBULIN SER CALC-MCNC: 3 G/DL (ref 2–4)
GLUCOSE BLD STRIP.AUTO-MCNC: 102 MG/DL (ref 65–117)
GLUCOSE BLD STRIP.AUTO-MCNC: 102 MG/DL (ref 65–117)
GLUCOSE BLD STRIP.AUTO-MCNC: 104 MG/DL (ref 65–117)
GLUCOSE BLD STRIP.AUTO-MCNC: 105 MG/DL (ref 65–117)
GLUCOSE BLD STRIP.AUTO-MCNC: 108 MG/DL (ref 65–117)
GLUCOSE BLD STRIP.AUTO-MCNC: 109 MG/DL (ref 65–117)
GLUCOSE BLD STRIP.AUTO-MCNC: 111 MG/DL (ref 65–117)
GLUCOSE BLD STRIP.AUTO-MCNC: 115 MG/DL (ref 65–117)
GLUCOSE BLD STRIP.AUTO-MCNC: 119 MG/DL (ref 65–117)
GLUCOSE BLD STRIP.AUTO-MCNC: 123 MG/DL (ref 65–117)
GLUCOSE BLD STRIP.AUTO-MCNC: 130 MG/DL (ref 65–117)
GLUCOSE BLD STRIP.AUTO-MCNC: 89 MG/DL (ref 65–117)
GLUCOSE BLD STRIP.AUTO-MCNC: 97 MG/DL (ref 65–117)
GLUCOSE BLD STRIP.AUTO-MCNC: 98 MG/DL (ref 65–117)
GLUCOSE BLD STRIP.AUTO-MCNC: 99 MG/DL (ref 65–117)
GLUCOSE SERPL-MCNC: 115 MG/DL (ref 65–100)
GLUCOSE SERPL-MCNC: 140 MG/DL (ref 65–100)
HBA1C MFR BLD: 8.5 % (ref 4–5.6)
HCO3 BLDA-SCNC: 22 MMOL/L (ref 22–26)
HCO3 BLDA-SCNC: 25 MMOL/L (ref 22–26)
HCT VFR BLD AUTO: 40.1 % (ref 36.6–50.3)
HCT VFR BLD AUTO: 41.7 % (ref 36.6–50.3)
HGB BLD-MCNC: 12.8 G/DL (ref 12.1–17)
HGB BLD-MCNC: 13.7 G/DL (ref 12.1–17)
MAGNESIUM SERPL-MCNC: 2.8 MG/DL (ref 1.6–2.4)
MCH RBC QN AUTO: 29 PG (ref 26–34)
MCH RBC QN AUTO: 29.8 PG (ref 26–34)
MCHC RBC AUTO-ENTMCNC: 31.9 G/DL (ref 30–36.5)
MCHC RBC AUTO-ENTMCNC: 32.9 G/DL (ref 30–36.5)
MCV RBC AUTO: 90.7 FL (ref 80–99)
MCV RBC AUTO: 90.7 FL (ref 80–99)
NRBC # BLD: 0 K/UL (ref 0–0.01)
NRBC # BLD: 0 K/UL (ref 0–0.01)
NRBC BLD-RTO: 0 PER 100 WBC
NRBC BLD-RTO: 0 PER 100 WBC
PCO2 BLDA: 38 MMHG (ref 35–45)
PCO2 BLDA: 46 MMHG (ref 35–45)
PCO2 BLDA: 49 MMHG (ref 35–45)
PCO2 BLDA: 53 MMHG (ref 35–45)
PEEP RESPIRATORY: 5
PH BLDA: 7.3 (ref 7.35–7.45)
PH BLDA: 7.3 (ref 7.35–7.45)
PH BLDA: 7.33 (ref 7.35–7.45)
PH BLDA: 7.43 (ref 7.35–7.45)
PLATELET # BLD AUTO: 123 K/UL (ref 150–400)
PLATELET # BLD AUTO: 95 K/UL (ref 150–400)
PO2 BLDA: 77 MMHG (ref 80–100)
PO2 BLDA: 78 MMHG (ref 80–100)
PO2 BLDA: 84 MMHG (ref 80–100)
PO2 BLDA: 94 MMHG (ref 80–100)
POTASSIUM SERPL-SCNC: 3.1 MMOL/L (ref 3.5–5.1)
POTASSIUM SERPL-SCNC: 4.6 MMOL/L (ref 3.5–5.1)
PRESSURE SUPPORT SETTING VENT: 10
PRESSURE SUPPORT SETTING VENT: 5
PROT SERPL-MCNC: 6.2 G/DL (ref 6.4–8.2)
PROT SERPL-MCNC: 6.7 G/DL (ref 6.4–8.2)
RBC # BLD AUTO: 4.42 M/UL (ref 4.1–5.7)
RBC # BLD AUTO: 4.6 M/UL (ref 4.1–5.7)
SAO2 % BLD: 94 % (ref 92–97)
SAO2 % BLD: 95 % (ref 92–97)
SAO2 % BLD: 96 % (ref 92–97)
SAO2 % BLD: 97 % (ref 92–97)
SAO2% DEVICE SAO2% SENSOR NAME: ABNORMAL
SERVICE CMNT-IMP: ABNORMAL
SERVICE CMNT-IMP: NORMAL
SODIUM SERPL-SCNC: 140 MMOL/L (ref 136–145)
SODIUM SERPL-SCNC: 141 MMOL/L (ref 136–145)
SPECIMEN EXP DATE BLD: NORMAL
SPECIMEN SITE: ABNORMAL
THERAPEUTIC RANGE: NORMAL SECS (ref 58–77)
UNIT DIVISION: 0
UNIT DIVISION: 0
VENTILATION MODE VENT: ABNORMAL
VENTILATION MODE VENT: ABNORMAL
VT SETTING VENT: 550
WBC # BLD AUTO: 12.1 K/UL (ref 4.1–11.1)
WBC # BLD AUTO: 9.3 K/UL (ref 4.1–11.1)

## 2024-02-14 PROCEDURE — 3700000001 HC ADD 15 MINUTES (ANESTHESIA): Performed by: THORACIC SURGERY (CARDIOTHORACIC VASCULAR SURGERY)

## 2024-02-14 PROCEDURE — 2580000003 HC RX 258: Performed by: NURSE ANESTHETIST, CERTIFIED REGISTERED

## 2024-02-14 PROCEDURE — 85730 THROMBOPLASTIN TIME PARTIAL: CPT

## 2024-02-14 PROCEDURE — 82803 BLOOD GASES ANY COMBINATION: CPT

## 2024-02-14 PROCEDURE — P9045 ALBUMIN (HUMAN), 5%, 250 ML: HCPCS | Performed by: PHYSICIAN ASSISTANT

## 2024-02-14 PROCEDURE — 2580000003 HC RX 258: Performed by: THORACIC SURGERY (CARDIOTHORACIC VASCULAR SURGERY)

## 2024-02-14 PROCEDURE — 02100AW BYPASS CORONARY ARTERY, ONE ARTERY FROM AORTA WITH AUTOLOGOUS ARTERIAL TISSUE, OPEN APPROACH: ICD-10-PCS | Performed by: THORACIC SURGERY (CARDIOTHORACIC VASCULAR SURGERY)

## 2024-02-14 PROCEDURE — 6370000000 HC RX 637 (ALT 250 FOR IP): Performed by: PHYSICIAN ASSISTANT

## 2024-02-14 PROCEDURE — C1781 MESH (IMPLANTABLE): HCPCS | Performed by: THORACIC SURGERY (CARDIOTHORACIC VASCULAR SURGERY)

## 2024-02-14 PROCEDURE — 6360000002 HC RX W HCPCS: Performed by: THORACIC SURGERY (CARDIOTHORACIC VASCULAR SURGERY)

## 2024-02-14 PROCEDURE — 5A1935Z RESPIRATORY VENTILATION, LESS THAN 24 CONSECUTIVE HOURS: ICD-10-PCS | Performed by: THORACIC SURGERY (CARDIOTHORACIC VASCULAR SURGERY)

## 2024-02-14 PROCEDURE — 33534 CABG ARTERIAL TWO: CPT | Performed by: PHYSICIAN ASSISTANT

## 2024-02-14 PROCEDURE — 80053 COMPREHEN METABOLIC PANEL: CPT

## 2024-02-14 PROCEDURE — 85027 COMPLETE CBC AUTOMATED: CPT

## 2024-02-14 PROCEDURE — 71045 X-RAY EXAM CHEST 1 VIEW: CPT

## 2024-02-14 PROCEDURE — 2709999900 HC NON-CHARGEABLE SUPPLY: Performed by: THORACIC SURGERY (CARDIOTHORACIC VASCULAR SURGERY)

## 2024-02-14 PROCEDURE — C1713 ANCHOR/SCREW BN/BN,TIS/BN: HCPCS | Performed by: THORACIC SURGERY (CARDIOTHORACIC VASCULAR SURGERY)

## 2024-02-14 PROCEDURE — 76998 US GUIDE INTRAOP: CPT | Performed by: THORACIC SURGERY (CARDIOTHORACIC VASCULAR SURGERY)

## 2024-02-14 PROCEDURE — B24BZZ4 ULTRASONOGRAPHY OF HEART WITH AORTA, TRANSESOPHAGEAL: ICD-10-PCS | Performed by: THORACIC SURGERY (CARDIOTHORACIC VASCULAR SURGERY)

## 2024-02-14 PROCEDURE — 3600000008 HC SURGERY OHS BASE: Performed by: THORACIC SURGERY (CARDIOTHORACIC VASCULAR SURGERY)

## 2024-02-14 PROCEDURE — 33534 CABG ARTERIAL TWO: CPT | Performed by: THORACIC SURGERY (CARDIOTHORACIC VASCULAR SURGERY)

## 2024-02-14 PROCEDURE — 6370000000 HC RX 637 (ALT 250 FOR IP)

## 2024-02-14 PROCEDURE — 2580000003 HC RX 258

## 2024-02-14 PROCEDURE — A4216 STERILE WATER/SALINE, 10 ML: HCPCS | Performed by: PHYSICIAN ASSISTANT

## 2024-02-14 PROCEDURE — 6370000000 HC RX 637 (ALT 250 FOR IP): Performed by: NURSE PRACTITIONER

## 2024-02-14 PROCEDURE — 2720000010 HC SURG SUPPLY STERILE: Performed by: THORACIC SURGERY (CARDIOTHORACIC VASCULAR SURGERY)

## 2024-02-14 PROCEDURE — A4217 STERILE WATER/SALINE, 500 ML: HCPCS | Performed by: THORACIC SURGERY (CARDIOTHORACIC VASCULAR SURGERY)

## 2024-02-14 PROCEDURE — 6370000000 HC RX 637 (ALT 250 FOR IP): Performed by: THORACIC SURGERY (CARDIOTHORACIC VASCULAR SURGERY)

## 2024-02-14 PROCEDURE — 82962 GLUCOSE BLOOD TEST: CPT

## 2024-02-14 PROCEDURE — 83735 ASSAY OF MAGNESIUM: CPT

## 2024-02-14 PROCEDURE — 2000000000 HC ICU R&B

## 2024-02-14 PROCEDURE — 33509 NDSC HRV UXTR ART 1 SGM CAB: CPT | Performed by: PHYSICIAN ASSISTANT

## 2024-02-14 PROCEDURE — 5A1221Z PERFORMANCE OF CARDIAC OUTPUT, CONTINUOUS: ICD-10-PCS | Performed by: THORACIC SURGERY (CARDIOTHORACIC VASCULAR SURGERY)

## 2024-02-14 PROCEDURE — 2580000003 HC RX 258: Performed by: NURSE PRACTITIONER

## 2024-02-14 PROCEDURE — 6360000002 HC RX W HCPCS: Performed by: NURSE ANESTHETIST, CERTIFIED REGISTERED

## 2024-02-14 PROCEDURE — 82330 ASSAY OF CALCIUM: CPT

## 2024-02-14 PROCEDURE — 2500000003 HC RX 250 WO HCPCS: Performed by: PHYSICIAN ASSISTANT

## 2024-02-14 PROCEDURE — 2500000003 HC RX 250 WO HCPCS

## 2024-02-14 PROCEDURE — 33509 NDSC HRV UXTR ART 1 SGM CAB: CPT | Performed by: THORACIC SURGERY (CARDIOTHORACIC VASCULAR SURGERY)

## 2024-02-14 PROCEDURE — 6360000002 HC RX W HCPCS

## 2024-02-14 PROCEDURE — C1729 CATH, DRAINAGE: HCPCS | Performed by: THORACIC SURGERY (CARDIOTHORACIC VASCULAR SURGERY)

## 2024-02-14 PROCEDURE — 37799 UNLISTED PX VASCULAR SURGERY: CPT

## 2024-02-14 PROCEDURE — 94002 VENT MGMT INPAT INIT DAY: CPT

## 2024-02-14 PROCEDURE — 02100Z9 BYPASS CORONARY ARTERY, ONE ARTERY FROM LEFT INTERNAL MAMMARY, OPEN APPROACH: ICD-10-PCS | Performed by: THORACIC SURGERY (CARDIOTHORACIC VASCULAR SURGERY)

## 2024-02-14 PROCEDURE — 6360000002 HC RX W HCPCS: Performed by: ANESTHESIOLOGY

## 2024-02-14 PROCEDURE — 6360000002 HC RX W HCPCS: Performed by: PHYSICIAN ASSISTANT

## 2024-02-14 PROCEDURE — 93005 ELECTROCARDIOGRAM TRACING: CPT | Performed by: PHYSICIAN ASSISTANT

## 2024-02-14 PROCEDURE — 3700000000 HC ANESTHESIA ATTENDED CARE: Performed by: THORACIC SURGERY (CARDIOTHORACIC VASCULAR SURGERY)

## 2024-02-14 PROCEDURE — C1768 GRAFT, VASCULAR: HCPCS | Performed by: THORACIC SURGERY (CARDIOTHORACIC VASCULAR SURGERY)

## 2024-02-14 PROCEDURE — 2580000003 HC RX 258: Performed by: PHYSICIAN ASSISTANT

## 2024-02-14 PROCEDURE — 2500000003 HC RX 250 WO HCPCS: Performed by: NURSE ANESTHETIST, CERTIFIED REGISTERED

## 2024-02-14 PROCEDURE — 3600000018 HC SURGERY OHS ADDTL 15MIN: Performed by: THORACIC SURGERY (CARDIOTHORACIC VASCULAR SURGERY)

## 2024-02-14 PROCEDURE — 36415 COLL VENOUS BLD VENIPUNCTURE: CPT

## 2024-02-14 PROCEDURE — 03BC4ZZ EXCISION OF LEFT RADIAL ARTERY, PERCUTANEOUS ENDOSCOPIC APPROACH: ICD-10-PCS | Performed by: THORACIC SURGERY (CARDIOTHORACIC VASCULAR SURGERY)

## 2024-02-14 DEVICE — CLIP INT SM WIDE WECK RED TI TRNSVRS GRV CHEVRON SHP W/ PERCIS TIP 24 PER PK: Type: IMPLANTABLE DEVICE | Status: FUNCTIONAL

## 2024-02-14 RX ORDER — DEXTROSE MONOHYDRATE 100 MG/ML
INJECTION, SOLUTION INTRAVENOUS CONTINUOUS PRN
Status: DISCONTINUED | OUTPATIENT
Start: 2024-02-14 | End: 2024-02-19 | Stop reason: HOSPADM

## 2024-02-14 RX ORDER — SODIUM CHLORIDE 0.9 % (FLUSH) 0.9 %
5-40 SYRINGE (ML) INJECTION PRN
Status: DISCONTINUED | OUTPATIENT
Start: 2024-02-14 | End: 2024-02-19 | Stop reason: HOSPADM

## 2024-02-14 RX ORDER — DIPHENHYDRAMINE HCL 25 MG
25 CAPSULE ORAL NIGHTLY PRN
Status: DISCONTINUED | OUTPATIENT
Start: 2024-02-15 | End: 2024-02-19 | Stop reason: HOSPADM

## 2024-02-14 RX ORDER — HEPARIN SODIUM 1000 [USP'U]/ML
INJECTION, SOLUTION INTRAVENOUS; SUBCUTANEOUS PRN
Status: DISCONTINUED | OUTPATIENT
Start: 2024-02-14 | End: 2024-02-14 | Stop reason: SDUPTHER

## 2024-02-14 RX ORDER — SODIUM CHLORIDE 9 MG/ML
INJECTION, SOLUTION INTRAVENOUS CONTINUOUS PRN
Status: DISCONTINUED | OUTPATIENT
Start: 2024-02-14 | End: 2024-02-14 | Stop reason: SDUPTHER

## 2024-02-14 RX ORDER — SODIUM CHLORIDE 450 MG/100ML
INJECTION, SOLUTION INTRAVENOUS CONTINUOUS
Status: DISCONTINUED | OUTPATIENT
Start: 2024-02-14 | End: 2024-02-19

## 2024-02-14 RX ORDER — MIDAZOLAM HYDROCHLORIDE 2 MG/2ML
1 INJECTION, SOLUTION INTRAMUSCULAR; INTRAVENOUS
Status: DISCONTINUED | OUTPATIENT
Start: 2024-02-14 | End: 2024-02-15

## 2024-02-14 RX ORDER — CEFAZOLIN SODIUM 1 G/3ML
INJECTION, POWDER, FOR SOLUTION INTRAMUSCULAR; INTRAVENOUS PRN
Status: DISCONTINUED | OUTPATIENT
Start: 2024-02-14 | End: 2024-02-14 | Stop reason: ALTCHOICE

## 2024-02-14 RX ORDER — ACETAMINOPHEN 325 MG/1
975 TABLET ORAL EVERY 6 HOURS SCHEDULED
Status: DISCONTINUED | OUTPATIENT
Start: 2024-02-14 | End: 2024-02-19 | Stop reason: HOSPADM

## 2024-02-14 RX ORDER — PROPOFOL 10 MG/ML
INJECTION, EMULSION INTRAVENOUS PRN
Status: DISCONTINUED | OUTPATIENT
Start: 2024-02-14 | End: 2024-02-14 | Stop reason: SDUPTHER

## 2024-02-14 RX ORDER — HYDRALAZINE HYDROCHLORIDE 20 MG/ML
10 INJECTION INTRAMUSCULAR; INTRAVENOUS EVERY 6 HOURS PRN
Status: DISCONTINUED | OUTPATIENT
Start: 2024-02-14 | End: 2024-02-17

## 2024-02-14 RX ORDER — INSULIN LISPRO 100 [IU]/ML
0-12 INJECTION, SOLUTION INTRAVENOUS; SUBCUTANEOUS
Status: DISCONTINUED | OUTPATIENT
Start: 2024-02-16 | End: 2024-02-16

## 2024-02-14 RX ORDER — ROCURONIUM BROMIDE 10 MG/ML
INJECTION, SOLUTION INTRAVENOUS PRN
Status: DISCONTINUED | OUTPATIENT
Start: 2024-02-14 | End: 2024-02-14 | Stop reason: SDUPTHER

## 2024-02-14 RX ORDER — MIDAZOLAM HYDROCHLORIDE 1 MG/ML
INJECTION INTRAMUSCULAR; INTRAVENOUS PRN
Status: DISCONTINUED | OUTPATIENT
Start: 2024-02-14 | End: 2024-02-14 | Stop reason: SDUPTHER

## 2024-02-14 RX ORDER — FENTANYL CITRATE 50 UG/ML
INJECTION, SOLUTION INTRAMUSCULAR; INTRAVENOUS PRN
Status: DISCONTINUED | OUTPATIENT
Start: 2024-02-14 | End: 2024-02-14 | Stop reason: SDUPTHER

## 2024-02-14 RX ORDER — PROTAMINE SULFATE 10 MG/ML
INJECTION, SOLUTION INTRAVENOUS PRN
Status: DISCONTINUED | OUTPATIENT
Start: 2024-02-14 | End: 2024-02-14 | Stop reason: SDUPTHER

## 2024-02-14 RX ORDER — ONDANSETRON 2 MG/ML
4 INJECTION INTRAMUSCULAR; INTRAVENOUS EVERY 4 HOURS PRN
Status: DISCONTINUED | OUTPATIENT
Start: 2024-02-14 | End: 2024-02-19 | Stop reason: HOSPADM

## 2024-02-14 RX ORDER — LIDOCAINE HYDROCHLORIDE 20 MG/ML
INJECTION, SOLUTION EPIDURAL; INFILTRATION; INTRACAUDAL; PERINEURAL PRN
Status: DISCONTINUED | OUTPATIENT
Start: 2024-02-14 | End: 2024-02-14 | Stop reason: SDUPTHER

## 2024-02-14 RX ORDER — NEOSTIGMINE METHYLSULFATE 1 MG/ML
INJECTION, SOLUTION INTRAVENOUS PRN
Status: DISCONTINUED | OUTPATIENT
Start: 2024-02-14 | End: 2024-02-14 | Stop reason: SDUPTHER

## 2024-02-14 RX ORDER — POTASSIUM CHLORIDE 29.8 MG/ML
20 INJECTION INTRAVENOUS PRN
Status: DISCONTINUED | OUTPATIENT
Start: 2024-02-14 | End: 2024-02-15

## 2024-02-14 RX ORDER — ONDANSETRON 2 MG/ML
INJECTION INTRAMUSCULAR; INTRAVENOUS PRN
Status: DISCONTINUED | OUTPATIENT
Start: 2024-02-14 | End: 2024-02-14 | Stop reason: SDUPTHER

## 2024-02-14 RX ORDER — PAPAVERINE HYDROCHLORIDE 30 MG/ML
INJECTION INTRAMUSCULAR; INTRAVENOUS PRN
Status: DISCONTINUED | OUTPATIENT
Start: 2024-02-14 | End: 2024-02-14 | Stop reason: ALTCHOICE

## 2024-02-14 RX ORDER — POLYETHYLENE GLYCOL 3350 17 G/17G
17 POWDER, FOR SOLUTION ORAL DAILY
Status: DISCONTINUED | OUTPATIENT
Start: 2024-02-14 | End: 2024-02-19 | Stop reason: HOSPADM

## 2024-02-14 RX ORDER — SUCCINYLCHOLINE/SOD CL,ISO/PF 200MG/10ML
SYRINGE (ML) INTRAVENOUS PRN
Status: DISCONTINUED | OUTPATIENT
Start: 2024-02-14 | End: 2024-02-14 | Stop reason: SDUPTHER

## 2024-02-14 RX ORDER — AMIODARONE HYDROCHLORIDE 200 MG/1
400 TABLET ORAL 2 TIMES DAILY
Status: DISCONTINUED | OUTPATIENT
Start: 2024-02-15 | End: 2024-02-19 | Stop reason: HOSPADM

## 2024-02-14 RX ORDER — ALBUMIN, HUMAN INJ 5% 5 %
12.5 SOLUTION INTRAVENOUS PRN
Status: COMPLETED | OUTPATIENT
Start: 2024-02-14 | End: 2024-02-14

## 2024-02-14 RX ORDER — DEXMEDETOMIDINE HYDROCHLORIDE 4 UG/ML
.1-1.5 INJECTION, SOLUTION INTRAVENOUS CONTINUOUS
Status: DISCONTINUED | OUTPATIENT
Start: 2024-02-14 | End: 2024-02-15

## 2024-02-14 RX ORDER — ALBUTEROL SULFATE 2.5 MG/3ML
2.5 SOLUTION RESPIRATORY (INHALATION) EVERY 4 HOURS PRN
Status: DISCONTINUED | OUTPATIENT
Start: 2024-02-14 | End: 2024-02-19 | Stop reason: HOSPADM

## 2024-02-14 RX ORDER — SODIUM CHLORIDE 9 MG/ML
INJECTION, SOLUTION INTRAVENOUS CONTINUOUS
Status: DISCONTINUED | OUTPATIENT
Start: 2024-02-14 | End: 2024-02-15

## 2024-02-14 RX ORDER — SENNA AND DOCUSATE SODIUM 50; 8.6 MG/1; MG/1
1 TABLET, FILM COATED ORAL 2 TIMES DAILY
Status: DISCONTINUED | OUTPATIENT
Start: 2024-02-14 | End: 2024-02-19 | Stop reason: HOSPADM

## 2024-02-14 RX ORDER — DESMOPRESSIN ACETATE 4 UG/ML
INJECTION, SOLUTION INTRAVENOUS; SUBCUTANEOUS PRN
Status: DISCONTINUED | OUTPATIENT
Start: 2024-02-14 | End: 2024-02-14 | Stop reason: SDUPTHER

## 2024-02-14 RX ORDER — OXYCODONE HYDROCHLORIDE 5 MG/1
5 TABLET ORAL EVERY 4 HOURS PRN
Status: DISCONTINUED | OUTPATIENT
Start: 2024-02-14 | End: 2024-02-19 | Stop reason: HOSPADM

## 2024-02-14 RX ORDER — GLYCOPYRROLATE 0.2 MG/ML
INJECTION INTRAMUSCULAR; INTRAVENOUS PRN
Status: DISCONTINUED | OUTPATIENT
Start: 2024-02-14 | End: 2024-02-14 | Stop reason: SDUPTHER

## 2024-02-14 RX ORDER — INSULIN LISPRO 100 [IU]/ML
1-20 INJECTION, SOLUTION INTRAVENOUS; SUBCUTANEOUS
Status: DISCONTINUED | OUTPATIENT
Start: 2024-02-14 | End: 2024-02-16

## 2024-02-14 RX ORDER — INSULIN GLARGINE 100 [IU]/ML
1-50 INJECTION, SOLUTION SUBCUTANEOUS
Status: ACTIVE | OUTPATIENT
Start: 2024-02-16 | End: 2024-02-17

## 2024-02-14 RX ORDER — BISACODYL 10 MG
10 SUPPOSITORY, RECTAL RECTAL DAILY PRN
Status: DISCONTINUED | OUTPATIENT
Start: 2024-02-14 | End: 2024-02-15

## 2024-02-14 RX ORDER — FAMOTIDINE 20 MG/1
20 TABLET, FILM COATED ORAL 2 TIMES DAILY
Status: DISPENSED | OUTPATIENT
Start: 2024-02-14 | End: 2024-02-19

## 2024-02-14 RX ORDER — LANOLIN ALCOHOL/MO/W.PET/CERES
400 CREAM (GRAM) TOPICAL 2 TIMES DAILY
Status: DISCONTINUED | OUTPATIENT
Start: 2024-02-15 | End: 2024-02-19 | Stop reason: HOSPADM

## 2024-02-14 RX ORDER — INSULIN LISPRO 100 [IU]/ML
0-6 INJECTION, SOLUTION INTRAVENOUS; SUBCUTANEOUS NIGHTLY
Status: DISCONTINUED | OUTPATIENT
Start: 2024-02-16 | End: 2024-02-16

## 2024-02-14 RX ORDER — SODIUM CHLORIDE, SODIUM LACTATE, POTASSIUM CHLORIDE, CALCIUM CHLORIDE 600; 310; 30; 20 MG/100ML; MG/100ML; MG/100ML; MG/100ML
INJECTION, SOLUTION INTRAVENOUS CONTINUOUS
Status: DISCONTINUED | OUTPATIENT
Start: 2024-02-14 | End: 2024-02-14

## 2024-02-14 RX ORDER — SODIUM CHLORIDE 0.9 % (FLUSH) 0.9 %
5-40 SYRINGE (ML) INJECTION EVERY 12 HOURS SCHEDULED
Status: DISCONTINUED | OUTPATIENT
Start: 2024-02-14 | End: 2024-02-19 | Stop reason: HOSPADM

## 2024-02-14 RX ORDER — HYDROMORPHONE HYDROCHLORIDE 1 MG/ML
0.5 INJECTION, SOLUTION INTRAMUSCULAR; INTRAVENOUS; SUBCUTANEOUS EVERY 4 HOURS PRN
Status: DISCONTINUED | OUTPATIENT
Start: 2024-02-14 | End: 2024-02-19 | Stop reason: HOSPADM

## 2024-02-14 RX ORDER — OXYCODONE HYDROCHLORIDE 5 MG/1
10 TABLET ORAL EVERY 4 HOURS PRN
Status: DISCONTINUED | OUTPATIENT
Start: 2024-02-14 | End: 2024-02-19 | Stop reason: HOSPADM

## 2024-02-14 RX ORDER — PHENYLEPHRINE HCL IN 0.9% NACL 0.4MG/10ML
SYRINGE (ML) INTRAVENOUS PRN
Status: DISCONTINUED | OUTPATIENT
Start: 2024-02-14 | End: 2024-02-14 | Stop reason: SDUPTHER

## 2024-02-14 RX ORDER — BUPIVACAINE HYDROCHLORIDE 2.5 MG/ML
INJECTION, SOLUTION EPIDURAL; INFILTRATION; INTRACAUDAL PRN
Status: DISCONTINUED | OUTPATIENT
Start: 2024-02-14 | End: 2024-02-14 | Stop reason: ALTCHOICE

## 2024-02-14 RX ORDER — LIDOCAINE 4 G/G
2 PATCH TOPICAL DAILY
Status: DISCONTINUED | OUTPATIENT
Start: 2024-02-14 | End: 2024-02-19 | Stop reason: HOSPADM

## 2024-02-14 RX ORDER — MAGNESIUM SULFATE HEPTAHYDRATE 40 MG/ML
INJECTION, SOLUTION INTRAVENOUS PRN
Status: DISCONTINUED | OUTPATIENT
Start: 2024-02-14 | End: 2024-02-14 | Stop reason: SDUPTHER

## 2024-02-14 RX ORDER — AMLODIPINE BESYLATE 2.5 MG/1
2.5 TABLET ORAL NIGHTLY
Status: DISCONTINUED | OUTPATIENT
Start: 2024-02-14 | End: 2024-02-15

## 2024-02-14 RX ORDER — SODIUM CHLORIDE, SODIUM LACTATE, POTASSIUM CHLORIDE, CALCIUM CHLORIDE 600; 310; 30; 20 MG/100ML; MG/100ML; MG/100ML; MG/100ML
INJECTION, SOLUTION INTRAVENOUS CONTINUOUS PRN
Status: DISCONTINUED | OUTPATIENT
Start: 2024-02-14 | End: 2024-02-14 | Stop reason: SDUPTHER

## 2024-02-14 RX ORDER — MAGNESIUM SULFATE IN WATER 40 MG/ML
2000 INJECTION, SOLUTION INTRAVENOUS PRN
Status: DISCONTINUED | OUTPATIENT
Start: 2024-02-14 | End: 2024-02-15

## 2024-02-14 RX ADMIN — FENTANYL CITRATE 100 MCG: 50 INJECTION INTRAMUSCULAR; INTRAVENOUS at 10:39

## 2024-02-14 RX ADMIN — LIDOCAINE HYDROCHLORIDE 40 MG: 20 INJECTION, SOLUTION EPIDURAL; INFILTRATION; INTRACAUDAL; PERINEURAL at 10:13

## 2024-02-14 RX ADMIN — MIDAZOLAM HYDROCHLORIDE 2 MG: 1 INJECTION, SOLUTION INTRAMUSCULAR; INTRAVENOUS at 06:40

## 2024-02-14 RX ADMIN — ROCURONIUM BROMIDE 45 MG: 10 INJECTION INTRAVENOUS at 10:17

## 2024-02-14 RX ADMIN — Medication 200 MG: at 10:13

## 2024-02-14 RX ADMIN — HYDROMORPHONE HYDROCHLORIDE 0.5 MG: 1 INJECTION, SOLUTION INTRAMUSCULAR; INTRAVENOUS; SUBCUTANEOUS at 15:43

## 2024-02-14 RX ADMIN — SODIUM CHLORIDE 0.61 UNITS/HR: 9 INJECTION, SOLUTION INTRAVENOUS at 10:30

## 2024-02-14 RX ADMIN — Medication 3 AMPULE: at 07:39

## 2024-02-14 RX ADMIN — MUPIROCIN: 20 OINTMENT TOPICAL at 21:10

## 2024-02-14 RX ADMIN — Medication 3000 MG: at 13:20

## 2024-02-14 RX ADMIN — Medication 80 MCG: at 12:05

## 2024-02-14 RX ADMIN — FENTANYL CITRATE 50 MCG: 50 INJECTION INTRAMUSCULAR; INTRAVENOUS at 06:41

## 2024-02-14 RX ADMIN — PROPOFOL 100 MG: 10 INJECTION, EMULSION INTRAVENOUS at 10:13

## 2024-02-14 RX ADMIN — SODIUM CHLORIDE, POTASSIUM CHLORIDE, SODIUM LACTATE AND CALCIUM CHLORIDE: 600; 310; 30; 20 INJECTION, SOLUTION INTRAVENOUS at 06:30

## 2024-02-14 RX ADMIN — DEXTROSE MONOHYDRATE 10 G/HR: 5 INJECTION, SOLUTION INTRAVENOUS at 10:18

## 2024-02-14 RX ADMIN — SODIUM CHLORIDE: 9 INJECTION, SOLUTION INTRAVENOUS at 10:10

## 2024-02-14 RX ADMIN — Medication 40 MCG: at 13:08

## 2024-02-14 RX ADMIN — PROPOFOL 20 MG: 10 INJECTION, EMULSION INTRAVENOUS at 10:42

## 2024-02-14 RX ADMIN — ROCURONIUM BROMIDE 20 MG: 10 INJECTION INTRAVENOUS at 12:12

## 2024-02-14 RX ADMIN — DEXTROSE MONOHYDRATE 5 MG/HR: 50 INJECTION, SOLUTION INTRAVENOUS at 10:42

## 2024-02-14 RX ADMIN — PHENYLEPHRINE HYDROCHLORIDE 25 MCG/MIN: 10 INJECTION INTRAVENOUS at 10:21

## 2024-02-14 RX ADMIN — SODIUM CHLORIDE: 4.5 INJECTION, SOLUTION INTRAVENOUS at 15:38

## 2024-02-14 RX ADMIN — Medication 40 MCG: at 12:04

## 2024-02-14 RX ADMIN — ONDANSETRON 4 MG: 2 INJECTION INTRAMUSCULAR; INTRAVENOUS at 13:46

## 2024-02-14 RX ADMIN — POTASSIUM CHLORIDE 20 MEQ: 29.8 INJECTION, SOLUTION INTRAVENOUS at 15:39

## 2024-02-14 RX ADMIN — HEPARIN SODIUM 50000 UNITS: 1000 INJECTION, SOLUTION INTRAVENOUS; SUBCUTANEOUS at 11:35

## 2024-02-14 RX ADMIN — ROCURONIUM BROMIDE 50 MG: 10 INJECTION INTRAVENOUS at 11:02

## 2024-02-14 RX ADMIN — MIDAZOLAM HYDROCHLORIDE 1 MG: 1 INJECTION, SOLUTION INTRAMUSCULAR; INTRAVENOUS at 06:53

## 2024-02-14 RX ADMIN — ALBUMIN (HUMAN) 12.5 G: 12.5 INJECTION, SOLUTION INTRAVENOUS at 22:09

## 2024-02-14 RX ADMIN — ALBUMIN (HUMAN) 12.5 G: 12.5 INJECTION, SOLUTION INTRAVENOUS at 14:48

## 2024-02-14 RX ADMIN — PROTAMINE SULFATE 350 MG: 10 INJECTION, SOLUTION INTRAVENOUS at 12:45

## 2024-02-14 RX ADMIN — SODIUM CHLORIDE, PRESERVATIVE FREE 10 ML: 5 INJECTION INTRAVENOUS at 21:10

## 2024-02-14 RX ADMIN — ROCURONIUM BROMIDE 5 MG: 10 INJECTION INTRAVENOUS at 10:13

## 2024-02-14 RX ADMIN — FAMOTIDINE 20 MG: 10 INJECTION, SOLUTION INTRAVENOUS at 21:09

## 2024-02-14 RX ADMIN — OXYCODONE 10 MG: 5 TABLET ORAL at 20:12

## 2024-02-14 RX ADMIN — Medication 4 MG: at 13:46

## 2024-02-14 RX ADMIN — SODIUM CHLORIDE 2.51 UNITS/HR: 9 INJECTION, SOLUTION INTRAVENOUS at 05:06

## 2024-02-14 RX ADMIN — FENTANYL CITRATE 50 MCG: 50 INJECTION INTRAMUSCULAR; INTRAVENOUS at 06:51

## 2024-02-14 RX ADMIN — MIDAZOLAM HYDROCHLORIDE 1 MG: 1 INJECTION, SOLUTION INTRAMUSCULAR; INTRAVENOUS at 06:49

## 2024-02-14 RX ADMIN — Medication 3000 MG: at 10:20

## 2024-02-14 RX ADMIN — SODIUM CHLORIDE, POTASSIUM CHLORIDE, SODIUM LACTATE AND CALCIUM CHLORIDE: 600; 310; 30; 20 INJECTION, SOLUTION INTRAVENOUS at 07:40

## 2024-02-14 RX ADMIN — Medication 40 MCG: at 12:02

## 2024-02-14 RX ADMIN — GLYCOPYRROLATE 0.6 MG: 0.2 INJECTION INTRAMUSCULAR; INTRAVENOUS at 13:46

## 2024-02-14 RX ADMIN — DEXMEDETOMIDINE HYDROCHLORIDE 0.4 MCG/KG/HR: 100 INJECTION, SOLUTION, CONCENTRATE INTRAVENOUS at 12:25

## 2024-02-14 RX ADMIN — ALBUMIN (HUMAN) 12.5 G: 12.5 INJECTION, SOLUTION INTRAVENOUS at 15:14

## 2024-02-14 RX ADMIN — DESMOPRESSIN ACETATE 39 MCG: 4 INJECTION, SOLUTION INTRAVENOUS; SUBCUTANEOUS at 13:31

## 2024-02-14 RX ADMIN — FENTANYL CITRATE 100 MCG: 50 INJECTION INTRAMUSCULAR; INTRAVENOUS at 10:53

## 2024-02-14 RX ADMIN — POTASSIUM CHLORIDE 20 MEQ: 29.8 INJECTION, SOLUTION INTRAVENOUS at 16:40

## 2024-02-14 RX ADMIN — POTASSIUM CHLORIDE 20 MEQ: 29.8 INJECTION, SOLUTION INTRAVENOUS at 17:43

## 2024-02-14 RX ADMIN — MIDAZOLAM HYDROCHLORIDE 1 MG: 1 INJECTION, SOLUTION INTRAMUSCULAR; INTRAVENOUS at 06:46

## 2024-02-14 RX ADMIN — SODIUM CHLORIDE, POTASSIUM CHLORIDE, SODIUM LACTATE AND CALCIUM CHLORIDE: 600; 310; 30; 20 INJECTION, SOLUTION INTRAVENOUS at 09:59

## 2024-02-14 RX ADMIN — FENTANYL CITRATE 50 MCG: 50 INJECTION INTRAMUSCULAR; INTRAVENOUS at 10:13

## 2024-02-14 RX ADMIN — CEFAZOLIN 3000 MG: 10 INJECTION, POWDER, FOR SOLUTION INTRAVENOUS at 21:13

## 2024-02-14 RX ADMIN — 0.12% CHLORHEXIDINE GLUCONATE 15 ML: 1.2 RINSE ORAL at 21:10

## 2024-02-14 RX ADMIN — AMLODIPINE BESYLATE 2.5 MG: 2.5 TABLET ORAL at 21:10

## 2024-02-14 RX ADMIN — MAGNESIUM SULFATE IN WATER 2000 MG: 40 INJECTION, SOLUTION INTRAVENOUS at 13:31

## 2024-02-14 RX ADMIN — DOCUSATE SODIUM AND SENNOSIDES 1 TABLET: 8.6; 5 TABLET, FILM COATED ORAL at 21:10

## 2024-02-14 RX ADMIN — SODIUM CHLORIDE: 9 INJECTION, SOLUTION INTRAVENOUS at 15:44

## 2024-02-14 ASSESSMENT — PAIN DESCRIPTION - DESCRIPTORS
DESCRIPTORS: ACHING
DESCRIPTORS: ACHING

## 2024-02-14 ASSESSMENT — PAIN SCALES - GENERAL
PAINLEVEL_OUTOF10: 2
PAINLEVEL_OUTOF10: 7

## 2024-02-14 ASSESSMENT — PAIN DESCRIPTION - ONSET: ONSET: ON-GOING

## 2024-02-14 ASSESSMENT — PAIN DESCRIPTION - ORIENTATION
ORIENTATION: ANTERIOR;MID
ORIENTATION: ANTERIOR;MID

## 2024-02-14 ASSESSMENT — PAIN DESCRIPTION - FREQUENCY: FREQUENCY: CONTINUOUS

## 2024-02-14 ASSESSMENT — PAIN - FUNCTIONAL ASSESSMENT
PAIN_FUNCTIONAL_ASSESSMENT: PREVENTS OR INTERFERES SOME ACTIVE ACTIVITIES AND ADLS
PAIN_FUNCTIONAL_ASSESSMENT: 0-10

## 2024-02-14 ASSESSMENT — PAIN DESCRIPTION - LOCATION
LOCATION: CHEST
LOCATION: CHEST

## 2024-02-14 ASSESSMENT — PAIN DESCRIPTION - PAIN TYPE: TYPE: SURGICAL PAIN

## 2024-02-14 ASSESSMENT — PULMONARY FUNCTION TESTS: PIF_VALUE: 38

## 2024-02-14 NOTE — ANESTHESIA PROCEDURE NOTES
Procedure Performed: ELIJAH       Start Time:        End Time:      Preanesthesia Checklist:  Patient identified, IV assessed, risks and benefits discussed, monitors and equipment assessed, procedure being performed at surgeon's request and anesthesia consent obtained.    General Procedure Information  Diagnostic Indications for Echo:  assessment of ascending aorta, assessment of surgical repair, hemodynamic monitoring and assessment of valve function  Location performed:  OR  Intubated  Bite block not placed  Heart visualized  Probe Insertion:  Easy  Probe Type:  Mulitplane, 3D and epiaortic  Modalities:  2D, color flow mapping, 3D, continuous wave Doppler and pulse wave Doppler    Echocardiographic and Doppler Measurements    Ventricles    Right Ventricle:  Hypertrophy not present.  Thrombus not present.  Global function normal.    Left Ventricle:  Hypertrophy not present.  Thrombus not present.  Ejection Fraction 35%.      Ventricular Regional Function:  1- Basal Anteroseptal:  normal  2- Basal Anterior:  normal  3- Basal Anterolateral:  normal  4- Basal Inferolateral:  normal  5- Basal Inferior:  normal  6- Basal Inferoseptal:  normal  7- Mid Anteroseptal:  hypokinetic  8- Mid Anterior:  hypokinetic  9- Mid Anterolateral:  hypokinetic  10- Mid Inferolateral:  hypokinetic  11- Mid Inferior:  hypokinetic  12- Mid Inferoseptal:  hypokinetic  13- Apical Anterior:  hypokinetic  14- Apical Lateral:  hypokinetic  15- Apical Inferior:  hypokinetic  16- Apical Septal:  hypokinetic  17- [place]:  hypokinetic      Valves    Aortic Valve:  Annulus normal.  Stenosis not present.  Regurgitation none.  Leaflet motions normal.      Mitral Valve:  Annulus dilated.  Stenosis not present.  Regurgitation mild.  Leaflet motions normal.      Tricuspid Valve:  Annulus normal.  Stenosis not present.  Leaflet motions normal.    Pulmonic Valve:  Annulus normal.  Stenosis not present.  Regurgitation none.    Other Valve Findings:       Trace

## 2024-02-14 NOTE — PERIOP NOTE
TRANSFER - IN REPORT:    Verbal report received from  on Rashad Rcihter  being received from CVICU for ordered procedure      Report consisted of patient's Situation, Background, Assessment and   Recommendations(SBAR).     Information from the following report(s) Intake/Output, Recent Results, Cardiac Rhythm  , and Pre Procedure Checklist was reviewed with the receiving nurse.    Opportunity for questions and clarification was provided.      Assessment completed upon patient's arrival to unit and care assumed.

## 2024-02-14 NOTE — CONSULTS
Coronary artery bypass graft (N/A, 2/14/2024).      Home Medications:     Prior to Admission medications    Medication Sig Start Date End Date Taking? Authorizing Provider   amLODIPine (NORVASC) 5 MG tablet Take 1 tablet by mouth daily    Jose A Daniels MD   hydrOXYzine HCl (ATARAX) 10 MG tablet Take 1 tablet by mouth 2 times daily as needed for Itching    Jose A Daniels MD   ezetimibe (ZETIA) 10 MG tablet Take 1 tablet by mouth daily  Patient not taking: Reported on 2/13/2024    Jose A Daniels MD   nitroGLYCERIN (NITROSTAT) 0.4 MG SL tablet Place 1 tablet under the tongue every 5 minutes as needed for Chest pain up to max of 3 total doses. If no relief after 1 dose, call 911.  Patient not taking: Reported on 2/13/2024    Jose A Daniels MD   isosorbide mononitrate (IMDUR) 30 MG extended release tablet Take 1 tablet by mouth daily  Patient not taking: Reported on 2/13/2024 1/2/24   Imelda Mcbride MD   furosemide (LASIX) 40 MG tablet TAKE 1 TABLET BY MOUTH EVERY DAY  Patient not taking: Reported on 2/13/2024 12/18/23   Maciel Laguna MD   atorvastatin (LIPITOR) 80 MG tablet TAKE 1 TABLET BY MOUTH EVERY DAY 12/14/23   Maciel Laguna MD   potassium chloride (MICRO-K) 10 MEQ extended release capsule TAKE 1 CAPSULE BY MOUTH DAILY  DAYS. 12/5/23   Maciel Laguna MD   chlorthalidone (HYGROTON) 25 MG tablet TAKE 1 TABLET BY MOUTH EVERY DAY 12/5/23   Maciel Laguna MD   linaclotide (LINZESS) 290 MCG CAPS capsule TAKE 1 CAPSULE BY MOUTH EVERY DAY AS NEEDED 5/30/23   Maciel Laguna MD   acetaminophen (TYLENOL) 500 MG tablet Take 1 tablet by mouth every 6 hours as needed for Pain  Patient not taking: Reported on 2/6/2024 5/25/23   Maciel Laguna MD   aspirin 81 MG EC tablet Take 1 tablet by mouth daily 8/5/21   Automatic Reconciliation, Ar   carvedilol (COREG) 25 MG tablet Take 1 tablet by mouth 2 times daily 3/15/23   Automatic Reconciliation, Ar   clopidogrel (PLAVIX) 75 MG

## 2024-02-14 NOTE — ANESTHESIA PROCEDURE NOTES
Arterial Line:    An arterial line was placed using surface landmarks, in the pre-op for the following indication(s): continuous blood pressure monitoring.    A 20 gauge (size), 1 and 3/4 inch (length), Arrow (type) catheter was placed, Seldinger technique used, into the right radial artery, secured by suture, tape and Tegaderm.  Anesthesia type: Local  Local infiltration: Injection    Events:  patient tolerated procedure well with no complications.    Additional notes:  Collateral perfusion verified   Performed: Anesthesiologist   Preanesthetic Checklist  Completed: patient identified, IV checked, risks and benefits discussed, surgical/procedural consents, equipment checked, pre-op evaluation, timeout performed, anesthesia consent given, oxygen available and monitors applied/VS acknowledged

## 2024-02-14 NOTE — BRIEF OP NOTE
Brief Postoperative Note      Patient: Rashad Richter  YOB: 1960  MRN: [id number]    Date of Procedure: 2/14/2024    Pre-Op Diagnosis Codes:     * Disease of cardiovascular system [I25.10]    Post-Op Diagnosis: Same       Procedure(s):  CABG X 2 LIMA to LAD, LRA to OM  ENDOSCOPIC LEFT RADIAL ARTERY HARVEST  ELIJAH AND EPI AORTIC BY DR STROUD     Surgeon(s):  Clayton Tomlin MD    Assistant:  Physician Assistant: Gabo Michel PA-C    Anesthesia: General    Estimated Blood Loss (mL): 300 cc    Cell Saver: 730cc    CPB: 41mins    XC:31mins    Complications: None    Specimens:   * No specimens in log *    Implants:  * No implants in log *      Drains:   Closed/Suction Drain Inferior;Midline Mediastinal (Active)       Urinary Catheter 02/14/24 2 Way;Murrell-Temperature (Active)       Findings: CAD      Electronically signed by Gabo Michel PA-C on 2/14/2024 at 2:31 PM

## 2024-02-14 NOTE — PERIOP NOTE
Insulin stopped per Dr Garcia.  Mepilex to sacrum, no redness tenderness or breakdown.  Red raised bumps noted to left upper arm under BP cuff.

## 2024-02-14 NOTE — H&P
Update History & Physical    The patient's History and Physical of February 13, 2024 was reviewed with the patient and I examined the patient. There was no change. The surgical site was confirmed by the patient and me.       Plan: The risks, benefits, expected outcome, and alternative to the recommended procedure have been discussed with the patient. Patient understands and wants to proceed with the procedure.     Electronically signed by Gabo Michel PA-C on 2/14/2024 at 8:57 AM

## 2024-02-14 NOTE — ANESTHESIA PROCEDURE NOTES
Central Venous Line:    A central venous line was placed using ultrasound guidance and surface landmarks, in the pre-op for the following indication(s): central venous access and CVP monitoring.    Sterility preparation included the following: hand hygiene performed prior to procedure, maximum sterile barriers used and sterile technique used to drape from head to toe.    The patient was placed in Trendelenburg position.The right internal jugular vein was prepped.    The site was prepped with Chloraprep.  A 9 Fr (size), 12 (length), introducer double lumen was placed.    During the procedure, the following specific steps were taken: target vein identified, needle advanced into vein and blood aspirated and guidewire advanced into vein.    Intravenous verification was obtained by ultrasound, venous blood return, ELIJAH, ELIJAH and manometry.    Post insertion care included: all ports aspirated, all ports flushed easily, guidewire removed intact, Biopatch applied, line sutured in place and dressing applied.    During the procedure the patient experienced: patient tolerated procedure well with no complications.      Outcomes: uncomplicated  Real-time US image taken/store: yes  Anesthesia type: local..No    Additional notes:  SLIC placed without event  Staffing  Performed: Anesthesiologist   Performed by: Geovanny Garcia DO  Authorized by: Geovanny Garcia DO    Preanesthetic Checklist  Completed: patient identified, IV checked, site marked, risks and benefits discussed, surgical/procedural consents, equipment checked, pre-op evaluation, timeout performed, anesthesia consent given, oxygen available and monitors applied/VS acknowledged

## 2024-02-14 NOTE — OR NURSING
1225:  Rewarming call to CVICU    1245:  shocked 10 J    1325:  TRANSFER - OUT REPORT:    Verbal report given to MEGHNA Michaels on Rashad Richter  being transferred to CVICU for routine post-op       Report consisted of patient's Situation, Background, Assessment and   Recommendations(SBAR).     Information from the following report(s) Adult Overview, Surgery Report, Intake/Output, and MAR was reviewed with the receiving nurse.           Lines:   CVC Double Lumen 02/14/24 (Active)       Peripheral IV 02/13/24 Posterior;Proximal;Right Forearm (Active)   Site Assessment Clean, dry & intact 02/14/24 0749   Line Status Flushed;Infusing 02/14/24 0749   Line Care Connections checked and tightened 02/14/24 0749   Phlebitis Assessment No symptoms 02/14/24 0749   Infiltration Assessment 0 02/14/24 0749   Alcohol Cap Used Yes 02/14/24 0300   Dressing Status Clean, dry & intact 02/14/24 0749   Dressing Type Transparent 02/14/24 0749   Dressing Intervention New 02/13/24 1300       Arterial Line 02/14/24 Radial (Active)       Introducer 02/14/24 (Active)        Opportunity for questions and clarification was provided.      Patient transported with:  Monitor, O2 @ 10lpm, Registered Nurse, and EDMUND Isabel PA-C

## 2024-02-14 NOTE — ANESTHESIA POSTPROCEDURE EVALUATION
Department of Anesthesiology  Postprocedure Note    Patient: Rashad Richter  MRN: [id number]  YOB: 1960  Date of evaluation: 2/14/2024    Procedure Summary       Date: 02/14/24 Room / Location: [facility] HEART OR M6 / [facility] OPEN HEART    Anesthesia Start: 0959 Anesthesia Stop: 1430    Procedure: ON PUMP CABG CORONARY ARTERY BYPASS GRAFT x 2 WITH LEFT RADIAL ARTERY HARVEST, LEFT INTERNAL MAMMARY ARTERY HARVEST, ELIJAH AND EPI AORTIC BY DR STROUD (NO PAC) (Chest) Diagnosis:       Disease of cardiovascular system      (Disease of cardiovascular system [I25.10])    Surgeons: Clayton Tomlin MD Responsible Provider: Geovanny Stroud DO    Anesthesia Type: General ASA Status: 4            Anesthesia Type: General    Sunshine Phase I:      Sunshine Phase II:      Anesthesia Post Evaluation    Patient location during evaluation: PACU  Note status: Sedated.  Post-procedure mental status: Sedated.  Pain score: 0  Airway patency: patent  Nausea & Vomiting: no nausea  Cardiovascular status: hemodynamically stable  Respiratory status: acceptable  Hydration status: euvolemic  Comments: Planned ventilation in ICU post CABG  Pain management: adequate    No notable events documented.

## 2024-02-15 ENCOUNTER — APPOINTMENT (OUTPATIENT)
Facility: HOSPITAL | Age: 64
DRG: 235 | End: 2024-02-15
Attending: THORACIC SURGERY (CARDIOTHORACIC VASCULAR SURGERY)
Payer: MEDICARE

## 2024-02-15 DIAGNOSIS — Z95.1 S/P CABG (CORONARY ARTERY BYPASS GRAFT): Primary | ICD-10-CM

## 2024-02-15 LAB
ACUTE KIDNEY INJURY RISK NEPHROCHECK: 1.86 (ref 0–0.3)
ANION GAP SERPL CALC-SCNC: 5 MMOL/L (ref 5–15)
ANION GAP SERPL CALC-SCNC: 7 MMOL/L (ref 5–15)
BUN SERPL-MCNC: 20 MG/DL (ref 6–20)
BUN SERPL-MCNC: 34 MG/DL (ref 6–20)
BUN/CREAT SERPL: 15 (ref 12–20)
BUN/CREAT SERPL: 19 (ref 12–20)
CALCIUM SERPL-MCNC: 8.9 MG/DL (ref 8.5–10.1)
CALCIUM SERPL-MCNC: 9 MG/DL (ref 8.5–10.1)
CHLORIDE SERPL-SCNC: 108 MMOL/L (ref 97–108)
CHLORIDE SERPL-SCNC: 111 MMOL/L (ref 97–108)
CO2 SERPL-SCNC: 27 MMOL/L (ref 21–32)
CO2 SERPL-SCNC: 27 MMOL/L (ref 21–32)
CREAT SERPL-MCNC: 1.36 MG/DL (ref 0.7–1.3)
CREAT SERPL-MCNC: 1.82 MG/DL (ref 0.7–1.3)
EKG ATRIAL RATE: 94 BPM
EKG DIAGNOSIS: NORMAL
EKG P AXIS: 54 DEGREES
EKG P-R INTERVAL: 148 MS
EKG Q-T INTERVAL: 348 MS
EKG QRS DURATION: 106 MS
EKG QTC CALCULATION (BAZETT): 435 MS
EKG R AXIS: -16 DEGREES
EKG T AXIS: -2 DEGREES
EKG VENTRICULAR RATE: 94 BPM
ERYTHROCYTE [DISTWIDTH] IN BLOOD BY AUTOMATED COUNT: 14.2 % (ref 11.5–14.5)
GLUCOSE BLD STRIP.AUTO-MCNC: 100 MG/DL (ref 65–117)
GLUCOSE BLD STRIP.AUTO-MCNC: 108 MG/DL (ref 65–117)
GLUCOSE BLD STRIP.AUTO-MCNC: 109 MG/DL (ref 65–117)
GLUCOSE BLD STRIP.AUTO-MCNC: 109 MG/DL (ref 65–117)
GLUCOSE BLD STRIP.AUTO-MCNC: 110 MG/DL (ref 65–117)
GLUCOSE BLD STRIP.AUTO-MCNC: 112 MG/DL (ref 65–117)
GLUCOSE BLD STRIP.AUTO-MCNC: 115 MG/DL (ref 65–117)
GLUCOSE BLD STRIP.AUTO-MCNC: 116 MG/DL (ref 65–117)
GLUCOSE BLD STRIP.AUTO-MCNC: 117 MG/DL (ref 65–117)
GLUCOSE BLD STRIP.AUTO-MCNC: 122 MG/DL (ref 65–117)
GLUCOSE BLD STRIP.AUTO-MCNC: 125 MG/DL (ref 65–117)
GLUCOSE BLD STRIP.AUTO-MCNC: 127 MG/DL (ref 65–117)
GLUCOSE BLD STRIP.AUTO-MCNC: 136 MG/DL (ref 65–117)
GLUCOSE BLD STRIP.AUTO-MCNC: 141 MG/DL (ref 65–117)
GLUCOSE BLD STRIP.AUTO-MCNC: 152 MG/DL (ref 65–117)
GLUCOSE BLD STRIP.AUTO-MCNC: 153 MG/DL (ref 65–117)
GLUCOSE BLD STRIP.AUTO-MCNC: 164 MG/DL (ref 65–117)
GLUCOSE BLD STRIP.AUTO-MCNC: 195 MG/DL (ref 65–117)
GLUCOSE BLD STRIP.AUTO-MCNC: 88 MG/DL (ref 65–117)
GLUCOSE SERPL-MCNC: 112 MG/DL (ref 65–100)
GLUCOSE SERPL-MCNC: 153 MG/DL (ref 65–100)
HCT VFR BLD AUTO: 38.7 % (ref 36.6–50.3)
HGB BLD-MCNC: 12 G/DL (ref 12.1–17)
MAGNESIUM SERPL-MCNC: 2.4 MG/DL (ref 1.6–2.4)
MCH RBC QN AUTO: 28.7 PG (ref 26–34)
MCHC RBC AUTO-ENTMCNC: 31 G/DL (ref 30–36.5)
MCV RBC AUTO: 92.6 FL (ref 80–99)
NRBC # BLD: 0 K/UL (ref 0–0.01)
NRBC BLD-RTO: 0 PER 100 WBC
PLATELET # BLD AUTO: 103 K/UL (ref 150–400)
POTASSIUM SERPL-SCNC: 4.5 MMOL/L (ref 3.5–5.1)
POTASSIUM SERPL-SCNC: 4.7 MMOL/L (ref 3.5–5.1)
RBC # BLD AUTO: 4.18 M/UL (ref 4.1–5.7)
SERVICE CMNT-IMP: ABNORMAL
SERVICE CMNT-IMP: NORMAL
SODIUM SERPL-SCNC: 140 MMOL/L (ref 136–145)
SODIUM SERPL-SCNC: 145 MMOL/L (ref 136–145)
T3FREE SERPL-MCNC: 1.3 PG/ML (ref 2.2–4)
T4 FREE SERPL-MCNC: 1 NG/DL (ref 0.8–1.5)
WBC # BLD AUTO: 10.1 K/UL (ref 4.1–11.1)

## 2024-02-15 PROCEDURE — 2580000003 HC RX 258: Performed by: PHYSICIAN ASSISTANT

## 2024-02-15 PROCEDURE — 6370000000 HC RX 637 (ALT 250 FOR IP): Performed by: PHYSICIAN ASSISTANT

## 2024-02-15 PROCEDURE — 6370000000 HC RX 637 (ALT 250 FOR IP): Performed by: NURSE PRACTITIONER

## 2024-02-15 PROCEDURE — 84481 FREE ASSAY (FT-3): CPT

## 2024-02-15 PROCEDURE — 6370000000 HC RX 637 (ALT 250 FOR IP): Performed by: THORACIC SURGERY (CARDIOTHORACIC VASCULAR SURGERY)

## 2024-02-15 PROCEDURE — 2580000003 HC RX 258: Performed by: NURSE PRACTITIONER

## 2024-02-15 PROCEDURE — 6360000002 HC RX W HCPCS: Performed by: THORACIC SURGERY (CARDIOTHORACIC VASCULAR SURGERY)

## 2024-02-15 PROCEDURE — 80048 BASIC METABOLIC PNL TOTAL CA: CPT

## 2024-02-15 PROCEDURE — 99231 SBSQ HOSP IP/OBS SF/LOW 25: CPT

## 2024-02-15 PROCEDURE — 6360000002 HC RX W HCPCS: Performed by: PHYSICIAN ASSISTANT

## 2024-02-15 PROCEDURE — 97166 OT EVAL MOD COMPLEX 45 MIN: CPT

## 2024-02-15 PROCEDURE — P9045 ALBUMIN (HUMAN), 5%, 250 ML: HCPCS | Performed by: THORACIC SURGERY (CARDIOTHORACIC VASCULAR SURGERY)

## 2024-02-15 PROCEDURE — 97162 PT EVAL MOD COMPLEX 30 MIN: CPT

## 2024-02-15 PROCEDURE — 6360000002 HC RX W HCPCS: Performed by: NURSE PRACTITIONER

## 2024-02-15 PROCEDURE — 2060000000 HC ICU INTERMEDIATE R&B

## 2024-02-15 PROCEDURE — 71045 X-RAY EXAM CHEST 1 VIEW: CPT

## 2024-02-15 PROCEDURE — 82962 GLUCOSE BLOOD TEST: CPT

## 2024-02-15 PROCEDURE — 85027 COMPLETE CBC AUTOMATED: CPT

## 2024-02-15 PROCEDURE — 2500000003 HC RX 250 WO HCPCS: Performed by: PHYSICIAN ASSISTANT

## 2024-02-15 PROCEDURE — 84439 ASSAY OF FREE THYROXINE: CPT

## 2024-02-15 PROCEDURE — 94640 AIRWAY INHALATION TREATMENT: CPT

## 2024-02-15 PROCEDURE — 97116 GAIT TRAINING THERAPY: CPT

## 2024-02-15 PROCEDURE — 2700000000 HC OXYGEN THERAPY PER DAY

## 2024-02-15 PROCEDURE — 97535 SELF CARE MNGMENT TRAINING: CPT

## 2024-02-15 PROCEDURE — 83735 ASSAY OF MAGNESIUM: CPT

## 2024-02-15 PROCEDURE — 36415 COLL VENOUS BLD VENIPUNCTURE: CPT

## 2024-02-15 RX ORDER — POTASSIUM CHLORIDE 7.45 MG/ML
10 INJECTION INTRAVENOUS PRN
Status: DISCONTINUED | OUTPATIENT
Start: 2024-02-15 | End: 2024-02-19 | Stop reason: HOSPADM

## 2024-02-15 RX ORDER — SODIUM CHLORIDE, SODIUM LACTATE, POTASSIUM CHLORIDE, CALCIUM CHLORIDE 600; 310; 30; 20 MG/100ML; MG/100ML; MG/100ML; MG/100ML
INJECTION, SOLUTION INTRAVENOUS CONTINUOUS
Status: DISCONTINUED | OUTPATIENT
Start: 2024-02-15 | End: 2024-02-16

## 2024-02-15 RX ORDER — CARVEDILOL 3.12 MG/1
6.25 TABLET ORAL 2 TIMES DAILY WITH MEALS
Status: DISCONTINUED | OUTPATIENT
Start: 2024-02-15 | End: 2024-02-16

## 2024-02-15 RX ORDER — AMLODIPINE BESYLATE 5 MG/1
5 TABLET ORAL DAILY
Status: DISCONTINUED | OUTPATIENT
Start: 2024-02-15 | End: 2024-02-19 | Stop reason: HOSPADM

## 2024-02-15 RX ORDER — BISACODYL 10 MG
10 SUPPOSITORY, RECTAL RECTAL DAILY PRN
Status: DISCONTINUED | OUTPATIENT
Start: 2024-02-15 | End: 2024-02-19 | Stop reason: HOSPADM

## 2024-02-15 RX ORDER — POTASSIUM CHLORIDE 20 MEQ/1
40 TABLET, EXTENDED RELEASE ORAL PRN
Status: DISCONTINUED | OUTPATIENT
Start: 2024-02-15 | End: 2024-02-19 | Stop reason: HOSPADM

## 2024-02-15 RX ORDER — AMLODIPINE BESYLATE 5 MG/1
5 TABLET ORAL NIGHTLY
Status: DISCONTINUED | OUTPATIENT
Start: 2024-02-15 | End: 2024-02-15

## 2024-02-15 RX ORDER — ALBUMIN, HUMAN INJ 5% 5 %
12.5 SOLUTION INTRAVENOUS EVERY 6 HOURS
Status: COMPLETED | OUTPATIENT
Start: 2024-02-15 | End: 2024-02-15

## 2024-02-15 RX ORDER — ALBUMIN, HUMAN INJ 5% 5 %
12.5 SOLUTION INTRAVENOUS ONCE
Status: DISCONTINUED | OUTPATIENT
Start: 2024-02-15 | End: 2024-02-15

## 2024-02-15 RX ADMIN — ACETAMINOPHEN 975 MG: 325 TABLET ORAL at 12:06

## 2024-02-15 RX ADMIN — AMIODARONE HYDROCHLORIDE 400 MG: 200 TABLET ORAL at 21:07

## 2024-02-15 RX ADMIN — ALBUTEROL SULFATE 2.5 MG: 2.5 SOLUTION RESPIRATORY (INHALATION) at 16:04

## 2024-02-15 RX ADMIN — DOCUSATE SODIUM AND SENNOSIDES 1 TABLET: 8.6; 5 TABLET, FILM COATED ORAL at 08:44

## 2024-02-15 RX ADMIN — CEFAZOLIN 3000 MG: 10 INJECTION, POWDER, FOR SOLUTION INTRAVENOUS at 05:04

## 2024-02-15 RX ADMIN — MUPIROCIN: 20 OINTMENT TOPICAL at 08:44

## 2024-02-15 RX ADMIN — SODIUM CHLORIDE 10 MG/HR: 9 INJECTION, SOLUTION INTRAVENOUS at 04:38

## 2024-02-15 RX ADMIN — SODIUM CHLORIDE, PRESERVATIVE FREE 10 ML: 5 INJECTION INTRAVENOUS at 21:05

## 2024-02-15 RX ADMIN — MUPIROCIN: 20 OINTMENT TOPICAL at 21:04

## 2024-02-15 RX ADMIN — ACETAMINOPHEN 975 MG: 325 TABLET ORAL at 00:17

## 2024-02-15 RX ADMIN — SODIUM CHLORIDE 10 MG/HR: 9 INJECTION, SOLUTION INTRAVENOUS at 07:30

## 2024-02-15 RX ADMIN — ASPIRIN 81 MG: 81 TABLET, COATED ORAL at 08:44

## 2024-02-15 RX ADMIN — Medication 400 MG: at 21:07

## 2024-02-15 RX ADMIN — FAMOTIDINE 20 MG: 20 TABLET, FILM COATED ORAL at 08:44

## 2024-02-15 RX ADMIN — AMLODIPINE BESYLATE 5 MG: 5 TABLET ORAL at 08:44

## 2024-02-15 RX ADMIN — OXYCODONE 10 MG: 5 TABLET ORAL at 14:25

## 2024-02-15 RX ADMIN — CARVEDILOL 6.25 MG: 3.12 TABLET, FILM COATED ORAL at 08:44

## 2024-02-15 RX ADMIN — 0.12% CHLORHEXIDINE GLUCONATE 15 ML: 1.2 RINSE ORAL at 08:44

## 2024-02-15 RX ADMIN — OXYCODONE 10 MG: 5 TABLET ORAL at 05:23

## 2024-02-15 RX ADMIN — FAMOTIDINE 20 MG: 20 TABLET, FILM COATED ORAL at 21:07

## 2024-02-15 RX ADMIN — ACETAMINOPHEN 975 MG: 325 TABLET ORAL at 06:42

## 2024-02-15 RX ADMIN — SODIUM CHLORIDE, POTASSIUM CHLORIDE, SODIUM LACTATE AND CALCIUM CHLORIDE: 600; 310; 30; 20 INJECTION, SOLUTION INTRAVENOUS at 15:57

## 2024-02-15 RX ADMIN — ALBUMIN (HUMAN) 12.5 G: 12.5 INJECTION, SOLUTION INTRAVENOUS at 14:30

## 2024-02-15 RX ADMIN — 0.12% CHLORHEXIDINE GLUCONATE 15 ML: 1.2 RINSE ORAL at 21:07

## 2024-02-15 RX ADMIN — HYDROMORPHONE HYDROCHLORIDE 0.5 MG: 1 INJECTION, SOLUTION INTRAMUSCULAR; INTRAVENOUS; SUBCUTANEOUS at 04:53

## 2024-02-15 RX ADMIN — SODIUM CHLORIDE, PRESERVATIVE FREE 10 ML: 5 INJECTION INTRAVENOUS at 08:36

## 2024-02-15 RX ADMIN — DOCUSATE SODIUM AND SENNOSIDES 1 TABLET: 8.6; 5 TABLET, FILM COATED ORAL at 21:07

## 2024-02-15 RX ADMIN — POLYETHYLENE GLYCOL 3350 17 G: 17 POWDER, FOR SOLUTION ORAL at 08:44

## 2024-02-15 RX ADMIN — HYDROMORPHONE HYDROCHLORIDE 0.5 MG: 1 INJECTION, SOLUTION INTRAMUSCULAR; INTRAVENOUS; SUBCUTANEOUS at 08:34

## 2024-02-15 RX ADMIN — ACETAMINOPHEN 975 MG: 325 TABLET ORAL at 17:33

## 2024-02-15 RX ADMIN — CEFAZOLIN 3000 MG: 10 INJECTION, POWDER, FOR SOLUTION INTRAVENOUS at 14:53

## 2024-02-15 RX ADMIN — OXYCODONE 10 MG: 5 TABLET ORAL at 00:44

## 2024-02-15 RX ADMIN — ATORVASTATIN CALCIUM 80 MG: 40 TABLET, FILM COATED ORAL at 08:44

## 2024-02-15 RX ADMIN — CARVEDILOL 6.25 MG: 3.12 TABLET, FILM COATED ORAL at 17:11

## 2024-02-15 RX ADMIN — AMIODARONE HYDROCHLORIDE 400 MG: 200 TABLET ORAL at 08:44

## 2024-02-15 RX ADMIN — ALBUMIN (HUMAN) 12.5 G: 12.5 INJECTION, SOLUTION INTRAVENOUS at 08:43

## 2024-02-15 RX ADMIN — Medication 400 MG: at 08:44

## 2024-02-15 RX ADMIN — CEFAZOLIN 3000 MG: 10 INJECTION, POWDER, FOR SOLUTION INTRAVENOUS at 21:11

## 2024-02-15 ASSESSMENT — PAIN - FUNCTIONAL ASSESSMENT
PAIN_FUNCTIONAL_ASSESSMENT: PREVENTS OR INTERFERES SOME ACTIVE ACTIVITIES AND ADLS
PAIN_FUNCTIONAL_ASSESSMENT: PREVENTS OR INTERFERES SOME ACTIVE ACTIVITIES AND ADLS

## 2024-02-15 ASSESSMENT — PAIN DESCRIPTION - PAIN TYPE
TYPE: SURGICAL PAIN
TYPE: SURGICAL PAIN

## 2024-02-15 ASSESSMENT — PAIN DESCRIPTION - DESCRIPTORS
DESCRIPTORS: ACHING
DESCRIPTORS: ACHING
DESCRIPTORS: SORE
DESCRIPTORS: ACHING
DESCRIPTORS: ACHING;SORE
DESCRIPTORS: ACHING
DESCRIPTORS: SORE
DESCRIPTORS: SORE

## 2024-02-15 ASSESSMENT — PAIN DESCRIPTION - LOCATION
LOCATION: CHEST
LOCATION: CHEST
LOCATION: CHEST;INCISION
LOCATION: CHEST
LOCATION: CHEST;INCISION
LOCATION: CHEST;INCISION
LOCATION: CHEST
LOCATION: CHEST

## 2024-02-15 ASSESSMENT — PAIN SCALES - GENERAL
PAINLEVEL_OUTOF10: 8
PAINLEVEL_OUTOF10: 1
PAINLEVEL_OUTOF10: 8
PAINLEVEL_OUTOF10: 7
PAINLEVEL_OUTOF10: 3
PAINLEVEL_OUTOF10: 8
PAINLEVEL_OUTOF10: 2
PAINLEVEL_OUTOF10: 7

## 2024-02-15 ASSESSMENT — PAIN DESCRIPTION - ORIENTATION
ORIENTATION: MID
ORIENTATION: ANTERIOR;MID
ORIENTATION: MID
ORIENTATION: MID
ORIENTATION: ANTERIOR;MID

## 2024-02-15 ASSESSMENT — PAIN DESCRIPTION - ONSET
ONSET: ON-GOING
ONSET: ON-GOING

## 2024-02-15 ASSESSMENT — PAIN DESCRIPTION - FREQUENCY
FREQUENCY: CONTINUOUS
FREQUENCY: CONTINUOUS

## 2024-02-15 NOTE — OP NOTE
[facility]  OPERATIVE REPORT    Name:  ADRIA OSORIO  MR#:  [id number]  :  1960  ACCOUNT #:  [id number]  DATE OF SERVICE:  2024    PREOPERATIVE DIAGNOSES:  1.  Severe multivessel coronary artery disease.  2.  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  3.  Hypertension.  4.  Hyperlipidemia.  5.  Carotid stenosis with complete carotid occlusion.  6.  New York Heart Association class 3 systolic and diastolic heart failure with ejection fraction of 35%.  7.  Morbid obesity with body mass index of 40.    POSTOPERATIVE DIAGNOSES:  1.  Severe multivessel coronary artery disease.  2.  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  3.  Hypertension.  4.  Hyperlipidemia.  5.  Carotid stenosis with complete carotid occlusion.  6.  New York Heart Association class 3 systolic and diastolic heart failure with ejection fraction of 35%.  7.  Morbid obesity with body mass index of 40.    PROCEDURES PERFORMED:  1.  Coronary artery bypass grafting x2:  Left radial artery to OM, left internal mammary artery LAD.  2.  Endoscopic harvest of the left radial artery.  3.  Epiaortic ultrasound.    SURGEON:  Calyton Tomlin MD    ASSISTANT:  Gabo Michel PA-C.    ANESTHESIA:  General endotracheal.    ANESTHESIOLOGIST:  Geovanny Garcia DO    COMPLICATIONS:  None.    SPECIMENS REMOVED:  None.    IMPLANTS:  None.    ESTIMATED BLOOD LOSS:  500 mL.    DETAILS:  The patient is a 63-year-old gentleman who is referred by Dr. Molina for coronary artery bypass grafting.      PROCEDURE:  He was prepped and draped in sterile fashion.  Time-out was performed.  An incision was made over the sternum and median sternotomy was performed.  Left hemisternum was elevated.  Left internal mammary was dissected free from the left chest wall,  It was ligated and divided and wrapped in a papaverine-soaked sponge.  Simultaneously, the PA began endoscopic harvest of the left radial artery.  A sternal retractor was placed.  Heparin was administered for

## 2024-02-15 NOTE — ACP (ADVANCE CARE PLANNING)
Advance Care Planning     Advance Care Planning Inpatient Note  [facility] Department    Today's Date: 2/15/2024  Unit: [facility] 2 CVICU    Received request from IDT Member.  Upon review of chart and communication with care team, patient's decision making abilities are not in question.. Patient was/were present in the room during visit, but he was sleeping.  Left a copy of the advance medical directive for his review, booklet \"Your Right to Decide\" and a note informing patient of  availability to discuss AMD further and/or provide assistance in completing document.     Goals of ACP Conversation:      Health Care Decision Makers:       Primary Decision Maker: JOPAM - [facility] - [phone]  Summary:      Advance Care Planning Documents (Patient Wishes):  None     Assessment:    Received request from IDT Member.  Upon review of chart and communication with care team, patient's decision making abilities are not in question.. Patient was/were present in the room during visit, but he was sleeping.  Left a copy of the advance medical directive for his review, booklet \"Your Right to Decide\" and a note informing patient of  availability to discuss AMD further and/or provide assistance in completing document.       Interventions:      Care Preferences Communicated:       Outcomes/Plan:  ACP Discussion: Postponed    Electronically signed by Chaplain Chris, [facility]  on 2/15/2024 at 11:01 AM

## 2024-02-15 NOTE — CARDIO/PULMONARY
Chart reviewed: Patient is 63 y.o. male admitted with CAD in native artery [I25.10]    Consult acknowledged    Education: CABG education folder at bedside.  Met with Rashad Richter to review cardiac surgery post discharge instructions and to discuss participation in the Cardiac Rehab Program.     Educated using teach back method. Reviewed the use of bear for sternal support, daily weight and temperature monitoring, showering restrictions, signs and symptoms of infection at surgery sites, daily walking and arm exercises, and use of incentive spirometer.   Rashad Richter was able to demonstrate proper use of incentive spirometer. Reviewed risk factors for CAD to include the following: family history, elevated BMI, hyperlipidemia, hypertension, diabetes, stress, and smoking. Discussed Heart Healthy/Low Sodium (2000 mg.) diet.     Discussed Cardiac Rehab Program format, benefits, and encouraged participation. Pt lives in [place]. Referral entered for our [facility] location. Pt states he has a lot going on when he returns home and is retiring soon and would like some time to think about enrolling in rehab. Reminded pt he had a major surgery and will need to take care of himself in order to do those things. Pt understood, [facility] will follow up to schedule via phone call.       BHAVESH MONGE RN

## 2024-02-15 NOTE — PROCEDURES
Venu drain dressing removed. Sites cleansed with CHG. Pacer wire care performed. CT x3 removed without difficulty. Vaseline gauze and 4x4 dressing applied. VSS. RN updated.       Pt with some wheezing, RN asked to call RT for prn albuterol tx.

## 2024-02-16 ENCOUNTER — APPOINTMENT (OUTPATIENT)
Facility: HOSPITAL | Age: 64
DRG: 235 | End: 2024-02-16
Attending: THORACIC SURGERY (CARDIOTHORACIC VASCULAR SURGERY)
Payer: MEDICARE

## 2024-02-16 DIAGNOSIS — Z95.1 STATUS POST CORONARY ARTERY BYPASS GRAFT: Primary | ICD-10-CM

## 2024-02-16 PROBLEM — E11.65 TYPE 2 DIABETES MELLITUS WITH HYPERGLYCEMIA, WITHOUT LONG-TERM CURRENT USE OF INSULIN (HCC): Status: ACTIVE | Noted: 2024-02-16

## 2024-02-16 LAB
ANION GAP SERPL CALC-SCNC: 2 MMOL/L (ref 5–15)
ANION GAP SERPL CALC-SCNC: 4 MMOL/L (ref 5–15)
BUN SERPL-MCNC: 38 MG/DL (ref 6–20)
BUN SERPL-MCNC: 38 MG/DL (ref 6–20)
BUN/CREAT SERPL: 26 (ref 12–20)
BUN/CREAT SERPL: 27 (ref 12–20)
CALCIUM SERPL-MCNC: 9.3 MG/DL (ref 8.5–10.1)
CALCIUM SERPL-MCNC: 9.4 MG/DL (ref 8.5–10.1)
CHLORIDE SERPL-SCNC: 102 MMOL/L (ref 97–108)
CHLORIDE SERPL-SCNC: 105 MMOL/L (ref 97–108)
CO2 SERPL-SCNC: 29 MMOL/L (ref 21–32)
CO2 SERPL-SCNC: 32 MMOL/L (ref 21–32)
CREAT SERPL-MCNC: 1.42 MG/DL (ref 0.7–1.3)
CREAT SERPL-MCNC: 1.49 MG/DL (ref 0.7–1.3)
ERYTHROCYTE [DISTWIDTH] IN BLOOD BY AUTOMATED COUNT: 14.3 % (ref 11.5–14.5)
GLUCOSE BLD STRIP.AUTO-MCNC: 103 MG/DL (ref 65–117)
GLUCOSE BLD STRIP.AUTO-MCNC: 108 MG/DL (ref 65–117)
GLUCOSE BLD STRIP.AUTO-MCNC: 113 MG/DL (ref 65–117)
GLUCOSE BLD STRIP.AUTO-MCNC: 114 MG/DL (ref 65–117)
GLUCOSE BLD STRIP.AUTO-MCNC: 119 MG/DL (ref 65–117)
GLUCOSE BLD STRIP.AUTO-MCNC: 121 MG/DL (ref 65–117)
GLUCOSE BLD STRIP.AUTO-MCNC: 124 MG/DL (ref 65–117)
GLUCOSE BLD STRIP.AUTO-MCNC: 127 MG/DL (ref 65–117)
GLUCOSE BLD STRIP.AUTO-MCNC: 128 MG/DL (ref 65–117)
GLUCOSE BLD STRIP.AUTO-MCNC: 142 MG/DL (ref 65–117)
GLUCOSE BLD STRIP.AUTO-MCNC: 156 MG/DL (ref 65–117)
GLUCOSE BLD STRIP.AUTO-MCNC: 161 MG/DL (ref 65–117)
GLUCOSE BLD STRIP.AUTO-MCNC: 94 MG/DL (ref 65–117)
GLUCOSE BLD STRIP.AUTO-MCNC: 97 MG/DL (ref 65–117)
GLUCOSE BLD STRIP.AUTO-MCNC: 99 MG/DL (ref 65–117)
GLUCOSE BLD STRIP.AUTO-MCNC: NORMAL MG/DL (ref 65–117)
GLUCOSE SERPL-MCNC: 105 MG/DL (ref 65–100)
GLUCOSE SERPL-MCNC: 96 MG/DL (ref 65–100)
HCT VFR BLD AUTO: 33.7 % (ref 36.6–50.3)
HGB BLD-MCNC: 10.5 G/DL (ref 12.1–17)
MAGNESIUM SERPL-MCNC: 2.7 MG/DL (ref 1.6–2.4)
MCH RBC QN AUTO: 28.6 PG (ref 26–34)
MCHC RBC AUTO-ENTMCNC: 31.2 G/DL (ref 30–36.5)
MCV RBC AUTO: 91.8 FL (ref 80–99)
NRBC # BLD: 0 K/UL (ref 0–0.01)
NRBC BLD-RTO: 0 PER 100 WBC
PLATELET # BLD AUTO: 87 K/UL (ref 150–400)
POTASSIUM SERPL-SCNC: 3.6 MMOL/L (ref 3.5–5.1)
POTASSIUM SERPL-SCNC: 4.2 MMOL/L (ref 3.5–5.1)
RBC # BLD AUTO: 3.67 M/UL (ref 4.1–5.7)
SERVICE CMNT-IMP: ABNORMAL
SERVICE CMNT-IMP: NORMAL
SODIUM SERPL-SCNC: 136 MMOL/L (ref 136–145)
SODIUM SERPL-SCNC: 138 MMOL/L (ref 136–145)
WBC # BLD AUTO: 11 K/UL (ref 4.1–11.1)

## 2024-02-16 PROCEDURE — 97110 THERAPEUTIC EXERCISES: CPT

## 2024-02-16 PROCEDURE — 6360000002 HC RX W HCPCS: Performed by: NURSE PRACTITIONER

## 2024-02-16 PROCEDURE — 6370000000 HC RX 637 (ALT 250 FOR IP): Performed by: NURSE PRACTITIONER

## 2024-02-16 PROCEDURE — 80048 BASIC METABOLIC PNL TOTAL CA: CPT

## 2024-02-16 PROCEDURE — 2580000003 HC RX 258: Performed by: NURSE PRACTITIONER

## 2024-02-16 PROCEDURE — 82962 GLUCOSE BLOOD TEST: CPT

## 2024-02-16 PROCEDURE — 2060000000 HC ICU INTERMEDIATE R&B

## 2024-02-16 PROCEDURE — 94640 AIRWAY INHALATION TREATMENT: CPT

## 2024-02-16 PROCEDURE — 71045 X-RAY EXAM CHEST 1 VIEW: CPT

## 2024-02-16 PROCEDURE — 36415 COLL VENOUS BLD VENIPUNCTURE: CPT

## 2024-02-16 PROCEDURE — 2700000000 HC OXYGEN THERAPY PER DAY

## 2024-02-16 PROCEDURE — 97535 SELF CARE MNGMENT TRAINING: CPT

## 2024-02-16 PROCEDURE — 83735 ASSAY OF MAGNESIUM: CPT

## 2024-02-16 PROCEDURE — 6370000000 HC RX 637 (ALT 250 FOR IP): Performed by: CLINICAL NURSE SPECIALIST

## 2024-02-16 PROCEDURE — 97116 GAIT TRAINING THERAPY: CPT

## 2024-02-16 PROCEDURE — 99233 SBSQ HOSP IP/OBS HIGH 50: CPT | Performed by: CLINICAL NURSE SPECIALIST

## 2024-02-16 PROCEDURE — 85027 COMPLETE CBC AUTOMATED: CPT

## 2024-02-16 RX ORDER — METFORMIN HYDROCHLORIDE 500 MG/1
500 TABLET, EXTENDED RELEASE ORAL
Qty: 90 TABLET | Refills: 0 | Status: SHIPPED | OUTPATIENT
Start: 2024-02-16

## 2024-02-16 RX ORDER — INSULIN LISPRO 100 [IU]/ML
0-8 INJECTION, SOLUTION INTRAVENOUS; SUBCUTANEOUS
Status: DISCONTINUED | OUTPATIENT
Start: 2024-02-16 | End: 2024-02-19 | Stop reason: HOSPADM

## 2024-02-16 RX ORDER — INSULIN GLARGINE 100 [IU]/ML
40 INJECTION, SOLUTION SUBCUTANEOUS DAILY
Status: DISCONTINUED | OUTPATIENT
Start: 2024-02-16 | End: 2024-02-19 | Stop reason: HOSPADM

## 2024-02-16 RX ORDER — INSULIN LISPRO 100 [IU]/ML
1-20 INJECTION, SOLUTION INTRAVENOUS; SUBCUTANEOUS
Status: COMPLETED | OUTPATIENT
Start: 2024-02-16 | End: 2024-02-16

## 2024-02-16 RX ORDER — INSULIN LISPRO 100 [IU]/ML
6 INJECTION, SOLUTION INTRAVENOUS; SUBCUTANEOUS
Status: DISCONTINUED | OUTPATIENT
Start: 2024-02-16 | End: 2024-02-19 | Stop reason: HOSPADM

## 2024-02-16 RX ORDER — FUROSEMIDE 10 MG/ML
40 INJECTION INTRAMUSCULAR; INTRAVENOUS ONCE
Status: COMPLETED | OUTPATIENT
Start: 2024-02-16 | End: 2024-02-16

## 2024-02-16 RX ORDER — INSULIN LISPRO 100 [IU]/ML
0-4 INJECTION, SOLUTION INTRAVENOUS; SUBCUTANEOUS NIGHTLY
Status: DISCONTINUED | OUTPATIENT
Start: 2024-02-16 | End: 2024-02-19 | Stop reason: HOSPADM

## 2024-02-16 RX ORDER — DULAGLUTIDE 0.75 MG/.5ML
0.75 INJECTION, SOLUTION SUBCUTANEOUS WEEKLY
Qty: 1 ADJUSTABLE DOSE PRE-FILLED PEN SYRINGE | Refills: 1 | Status: SHIPPED | OUTPATIENT
Start: 2024-02-16

## 2024-02-16 RX ORDER — LANCETS 30 GAUGE
1 EACH MISCELLANEOUS DAILY
Qty: 100 EACH | Refills: 3 | Status: SHIPPED | OUTPATIENT
Start: 2024-02-16

## 2024-02-16 RX ORDER — CARVEDILOL 12.5 MG/1
12.5 TABLET ORAL 2 TIMES DAILY WITH MEALS
Status: DISCONTINUED | OUTPATIENT
Start: 2024-02-16 | End: 2024-02-17

## 2024-02-16 RX ORDER — CARVEDILOL 3.12 MG/1
6.25 TABLET ORAL ONCE
Status: COMPLETED | OUTPATIENT
Start: 2024-02-16 | End: 2024-02-16

## 2024-02-16 RX ORDER — GLUCOSAMINE HCL/CHONDROITIN SU 500-400 MG
CAPSULE ORAL
Qty: 100 STRIP | Refills: 0 | Status: SHIPPED | OUTPATIENT
Start: 2024-02-16

## 2024-02-16 RX ORDER — POTASSIUM CHLORIDE 20 MEQ/1
40 TABLET, EXTENDED RELEASE ORAL ONCE
Status: COMPLETED | OUTPATIENT
Start: 2024-02-16 | End: 2024-02-16

## 2024-02-16 RX ADMIN — CARVEDILOL 12.5 MG: 12.5 TABLET, FILM COATED ORAL at 16:10

## 2024-02-16 RX ADMIN — CARVEDILOL 6.25 MG: 3.12 TABLET, FILM COATED ORAL at 08:04

## 2024-02-16 RX ADMIN — OXYCODONE 10 MG: 5 TABLET ORAL at 16:10

## 2024-02-16 RX ADMIN — ACETAMINOPHEN 975 MG: 325 TABLET ORAL at 11:32

## 2024-02-16 RX ADMIN — CARVEDILOL 6.25 MG: 3.12 TABLET, FILM COATED ORAL at 10:06

## 2024-02-16 RX ADMIN — MUPIROCIN: 20 OINTMENT TOPICAL at 20:18

## 2024-02-16 RX ADMIN — MUPIROCIN: 20 OINTMENT TOPICAL at 08:08

## 2024-02-16 RX ADMIN — Medication 400 MG: at 08:06

## 2024-02-16 RX ADMIN — OXYCODONE 10 MG: 5 TABLET ORAL at 20:20

## 2024-02-16 RX ADMIN — 0.12% CHLORHEXIDINE GLUCONATE 15 ML: 1.2 RINSE ORAL at 20:16

## 2024-02-16 RX ADMIN — POTASSIUM CHLORIDE 40 MEQ: 1500 TABLET, EXTENDED RELEASE ORAL at 15:07

## 2024-02-16 RX ADMIN — HYDRALAZINE HYDROCHLORIDE 10 MG: 20 INJECTION, SOLUTION INTRAMUSCULAR; INTRAVENOUS at 20:19

## 2024-02-16 RX ADMIN — OXYCODONE 10 MG: 5 TABLET ORAL at 08:03

## 2024-02-16 RX ADMIN — ACETAMINOPHEN 975 MG: 325 TABLET ORAL at 18:25

## 2024-02-16 RX ADMIN — FAMOTIDINE 20 MG: 20 TABLET, FILM COATED ORAL at 20:16

## 2024-02-16 RX ADMIN — ASPIRIN 81 MG: 81 TABLET, COATED ORAL at 08:05

## 2024-02-16 RX ADMIN — DOCUSATE SODIUM AND SENNOSIDES 1 TABLET: 8.6; 5 TABLET, FILM COATED ORAL at 08:06

## 2024-02-16 RX ADMIN — SODIUM CHLORIDE: 4.5 INJECTION, SOLUTION INTRAVENOUS at 11:42

## 2024-02-16 RX ADMIN — INSULIN GLARGINE 40 UNITS: 100 INJECTION, SOLUTION SUBCUTANEOUS at 11:32

## 2024-02-16 RX ADMIN — SODIUM CHLORIDE, PRESERVATIVE FREE 10 ML: 5 INJECTION INTRAVENOUS at 20:18

## 2024-02-16 RX ADMIN — FUROSEMIDE 40 MG: 10 INJECTION, SOLUTION INTRAMUSCULAR; INTRAVENOUS at 10:53

## 2024-02-16 RX ADMIN — DOCUSATE SODIUM AND SENNOSIDES 1 TABLET: 8.6; 5 TABLET, FILM COATED ORAL at 20:18

## 2024-02-16 RX ADMIN — AMIODARONE HYDROCHLORIDE 400 MG: 200 TABLET ORAL at 08:04

## 2024-02-16 RX ADMIN — INSULIN LISPRO 4 UNITS: 100 INJECTION, SOLUTION INTRAVENOUS; SUBCUTANEOUS at 09:22

## 2024-02-16 RX ADMIN — FAMOTIDINE 20 MG: 20 TABLET, FILM COATED ORAL at 08:06

## 2024-02-16 RX ADMIN — ATORVASTATIN CALCIUM 80 MG: 40 TABLET, FILM COATED ORAL at 08:05

## 2024-02-16 RX ADMIN — INSULIN LISPRO 6 UNITS: 100 INJECTION, SOLUTION INTRAVENOUS; SUBCUTANEOUS at 18:25

## 2024-02-16 RX ADMIN — FUROSEMIDE 40 MG: 10 INJECTION, SOLUTION INTRAMUSCULAR; INTRAVENOUS at 15:08

## 2024-02-16 RX ADMIN — INSULIN LISPRO 4 UNITS: 100 INJECTION, SOLUTION INTRAVENOUS; SUBCUTANEOUS at 13:06

## 2024-02-16 RX ADMIN — ACETAMINOPHEN 975 MG: 325 TABLET ORAL at 01:13

## 2024-02-16 RX ADMIN — 0.12% CHLORHEXIDINE GLUCONATE 15 ML: 1.2 RINSE ORAL at 08:08

## 2024-02-16 RX ADMIN — HYDRALAZINE HYDROCHLORIDE 10 MG: 20 INJECTION, SOLUTION INTRAMUSCULAR; INTRAVENOUS at 21:52

## 2024-02-16 RX ADMIN — CEFAZOLIN 3000 MG: 10 INJECTION, POWDER, FOR SOLUTION INTRAVENOUS at 05:17

## 2024-02-16 RX ADMIN — SODIUM CHLORIDE, PRESERVATIVE FREE 10 ML: 5 INJECTION INTRAVENOUS at 08:08

## 2024-02-16 RX ADMIN — ALBUTEROL SULFATE 2.5 MG: 2.5 SOLUTION RESPIRATORY (INHALATION) at 04:52

## 2024-02-16 RX ADMIN — OXYCODONE 10 MG: 5 TABLET ORAL at 11:32

## 2024-02-16 RX ADMIN — Medication 400 MG: at 20:17

## 2024-02-16 RX ADMIN — AMIODARONE HYDROCHLORIDE 400 MG: 200 TABLET ORAL at 20:16

## 2024-02-16 RX ADMIN — AMLODIPINE BESYLATE 5 MG: 5 TABLET ORAL at 08:06

## 2024-02-16 RX ADMIN — POLYETHYLENE GLYCOL 3350 17 G: 17 POWDER, FOR SOLUTION ORAL at 08:07

## 2024-02-16 ASSESSMENT — PAIN DESCRIPTION - ORIENTATION
ORIENTATION: ANTERIOR;MID
ORIENTATION: MID;ANTERIOR
ORIENTATION: MID;ANTERIOR

## 2024-02-16 ASSESSMENT — PAIN DESCRIPTION - LOCATION
LOCATION: CHEST;STERNUM
LOCATION: CHEST
LOCATION: INCISION
LOCATION: CHEST

## 2024-02-16 ASSESSMENT — PAIN DESCRIPTION - DESCRIPTORS
DESCRIPTORS: ACHING;SORE
DESCRIPTORS: ACHING;THROBBING;SORE
DESCRIPTORS: ACHING;THROBBING;SORE

## 2024-02-16 ASSESSMENT — PAIN SCALES - GENERAL
PAINLEVEL_OUTOF10: 7
PAINLEVEL_OUTOF10: 2
PAINLEVEL_OUTOF10: 7
PAINLEVEL_OUTOF10: 2
PAINLEVEL_OUTOF10: 8
PAINLEVEL_OUTOF10: 6
PAINLEVEL_OUTOF10: 4
PAINLEVEL_OUTOF10: 7

## 2024-02-16 ASSESSMENT — PAIN DESCRIPTION - PAIN TYPE: TYPE: SURGICAL PAIN

## 2024-02-16 ASSESSMENT — PAIN DESCRIPTION - ONSET: ONSET: ON-GOING

## 2024-02-16 NOTE — CARE COORDINATION
Transition of Care Plan:    RUR: 1%  Prior Level of Functioning: Independent  Disposition: Home [facility]    If SNF or IPR: Date FOC offered: 2/16/24  Date FOC received: 2/16/24  Accepting facility:   Date authorization started with reference number:   Date authorization received and expires:   Follow up appointments: Specialist, PCP  DME needed: pending clinical progress  Transportation at discharge: Family to provide transportation   IM/IMM Medicare/ letter given: 2nd IM Letter given   Is patient a  and connected with [facility]? N/a   If yes, was [place] transfer form completed and [facility] notified?   Caregiver Contact: Wife- Apple Richter - [phone]  Discharge Caregiver contacted prior to discharge?   CM called and discussed d/c plan with wifeApple for  services. She agreed  Care Conference needed? none  Barriers to discharge: Cardio Thoracic clearance    Updated Note  4:004pm    [facility] has accepted pt for HH    Updated Note #:45pm  CM sent HH referral via [facility] is reviewing.    Pt has Medicare A and may not have HH benefits  .  CM has sent referral to [facility] to review as a 2nd option.      WES Crook,RN  Care   [facility]  Phone: [phone]

## 2024-02-16 NOTE — DISCHARGE INSTRUCTIONS
[facility]     [address] [facility]                        [address] [place] [address] I  [address]                                                           [address]    [place] [address]                                       [place] [address]  Office- [phone]  Fax- [phone]        Office- [phone]  Fax- [phone]  _________________________________________________________________  Dr. Shady Shields, RADHA Roberto, QUINTIN Benoit, RADHA Walsh, RADHA Perez, QUINTIN Chan Dr., NP                                                                                                                                                            Name:Rashad Richter     Surgery & Date: 2/14/24    Discharge Date: 2/19/24    MEDICATIONS:  Please refer to your After Visit Summary for your medication list. If you do not have a prescription for a new medication, you may purchase the medication over the counter.   DO NOT TAKE ANY MEDICATIONS THAT ARE NOT ON THIS LIST      INSTRUCTIONS:  NO SMOKING OR TOBACCO PRODUCTS  Follow all the instructions in your discharge book  Shower daily.  Wash all incisions twice daily with Dial soap and water.  After each wash with soap and water, wash incisions with Dynahex 4 solution and rinse. Do this for 14 days. No lotions, ointments or powder.  Call the office immediately for any redness, swelling, or drainage from your incision.   Take your temperature daily and call for a temperature of 101 degrees or higher or for any symptoms that make you think you have and infection.  Weigh yourself each morning.  Call if you gain more than 5 pounds in 48 hours.  Use the incentive spirometer 6-8 times a day-10 breaths each time.  Use a pillow or your bear to splint

## 2024-02-16 NOTE — CARDIO/PULMONARY
Chart reviewed: Patient is 63 y.o. male admitted with CAD in native artery [I25.10]    Consult acknowledged. Pt was seen by [facility] on 2/15, See note    Marshall Porras RN

## 2024-02-17 ENCOUNTER — APPOINTMENT (OUTPATIENT)
Facility: HOSPITAL | Age: 64
DRG: 235 | End: 2024-02-17
Attending: THORACIC SURGERY (CARDIOTHORACIC VASCULAR SURGERY)
Payer: MEDICARE

## 2024-02-17 LAB
ANION GAP SERPL CALC-SCNC: 4 MMOL/L (ref 5–15)
BUN SERPL-MCNC: 46 MG/DL (ref 6–20)
BUN/CREAT SERPL: 35 (ref 12–20)
CALCIUM SERPL-MCNC: 9.6 MG/DL (ref 8.5–10.1)
CHLORIDE SERPL-SCNC: 104 MMOL/L (ref 97–108)
CO2 SERPL-SCNC: 32 MMOL/L (ref 21–32)
CREAT SERPL-MCNC: 1.3 MG/DL (ref 0.7–1.3)
ERYTHROCYTE [DISTWIDTH] IN BLOOD BY AUTOMATED COUNT: 14.3 % (ref 11.5–14.5)
GLUCOSE BLD STRIP.AUTO-MCNC: 105 MG/DL (ref 65–117)
GLUCOSE BLD STRIP.AUTO-MCNC: 129 MG/DL (ref 65–117)
GLUCOSE BLD STRIP.AUTO-MCNC: 146 MG/DL (ref 65–117)
GLUCOSE BLD STRIP.AUTO-MCNC: 164 MG/DL (ref 65–117)
GLUCOSE SERPL-MCNC: 137 MG/DL (ref 65–100)
HCT VFR BLD AUTO: 32 % (ref 36.6–50.3)
HGB BLD-MCNC: 10.1 G/DL (ref 12.1–17)
MAGNESIUM SERPL-MCNC: 2.9 MG/DL (ref 1.6–2.4)
MCH RBC QN AUTO: 28.9 PG (ref 26–34)
MCHC RBC AUTO-ENTMCNC: 31.6 G/DL (ref 30–36.5)
MCV RBC AUTO: 91.7 FL (ref 80–99)
NRBC # BLD: 0 K/UL (ref 0–0.01)
NRBC BLD-RTO: 0 PER 100 WBC
PLATELET # BLD AUTO: 83 K/UL (ref 150–400)
POTASSIUM SERPL-SCNC: 4.3 MMOL/L (ref 3.5–5.1)
RBC # BLD AUTO: 3.49 M/UL (ref 4.1–5.7)
SERVICE CMNT-IMP: ABNORMAL
SERVICE CMNT-IMP: NORMAL
SODIUM SERPL-SCNC: 140 MMOL/L (ref 136–145)
WBC # BLD AUTO: 8.2 K/UL (ref 4.1–11.1)

## 2024-02-17 PROCEDURE — 6370000000 HC RX 637 (ALT 250 FOR IP): Performed by: CLINICAL NURSE SPECIALIST

## 2024-02-17 PROCEDURE — 6360000002 HC RX W HCPCS: Performed by: NURSE PRACTITIONER

## 2024-02-17 PROCEDURE — 6370000000 HC RX 637 (ALT 250 FOR IP): Performed by: NURSE PRACTITIONER

## 2024-02-17 PROCEDURE — 97116 GAIT TRAINING THERAPY: CPT

## 2024-02-17 PROCEDURE — 94640 AIRWAY INHALATION TREATMENT: CPT

## 2024-02-17 PROCEDURE — 82962 GLUCOSE BLOOD TEST: CPT

## 2024-02-17 PROCEDURE — 83735 ASSAY OF MAGNESIUM: CPT

## 2024-02-17 PROCEDURE — 85027 COMPLETE CBC AUTOMATED: CPT

## 2024-02-17 PROCEDURE — 2580000003 HC RX 258: Performed by: NURSE PRACTITIONER

## 2024-02-17 PROCEDURE — 71045 X-RAY EXAM CHEST 1 VIEW: CPT

## 2024-02-17 PROCEDURE — 2060000000 HC ICU INTERMEDIATE R&B

## 2024-02-17 PROCEDURE — 80048 BASIC METABOLIC PNL TOTAL CA: CPT

## 2024-02-17 PROCEDURE — 36415 COLL VENOUS BLD VENIPUNCTURE: CPT

## 2024-02-17 RX ORDER — HYDRALAZINE HYDROCHLORIDE 20 MG/ML
20 INJECTION INTRAMUSCULAR; INTRAVENOUS EVERY 6 HOURS PRN
Status: DISCONTINUED | OUTPATIENT
Start: 2024-02-17 | End: 2024-02-19 | Stop reason: HOSPADM

## 2024-02-17 RX ORDER — CARVEDILOL 12.5 MG/1
25 TABLET ORAL 2 TIMES DAILY WITH MEALS
Status: DISCONTINUED | OUTPATIENT
Start: 2024-02-17 | End: 2024-02-19 | Stop reason: HOSPADM

## 2024-02-17 RX ORDER — POTASSIUM CHLORIDE 20 MEQ/1
20 TABLET, EXTENDED RELEASE ORAL 2 TIMES DAILY
Status: COMPLETED | OUTPATIENT
Start: 2024-02-17 | End: 2024-02-17

## 2024-02-17 RX ORDER — FUROSEMIDE 10 MG/ML
40 INJECTION INTRAMUSCULAR; INTRAVENOUS 2 TIMES DAILY
Status: COMPLETED | OUTPATIENT
Start: 2024-02-17 | End: 2024-02-17

## 2024-02-17 RX ADMIN — POLYETHYLENE GLYCOL 3350 17 G: 17 POWDER, FOR SOLUTION ORAL at 08:44

## 2024-02-17 RX ADMIN — ACETAMINOPHEN 975 MG: 325 TABLET ORAL at 23:05

## 2024-02-17 RX ADMIN — Medication 400 MG: at 08:45

## 2024-02-17 RX ADMIN — ACETAMINOPHEN 975 MG: 325 TABLET ORAL at 04:22

## 2024-02-17 RX ADMIN — MUPIROCIN: 20 OINTMENT TOPICAL at 20:56

## 2024-02-17 RX ADMIN — ATORVASTATIN CALCIUM 80 MG: 40 TABLET, FILM COATED ORAL at 08:45

## 2024-02-17 RX ADMIN — AMIODARONE HYDROCHLORIDE 400 MG: 200 TABLET ORAL at 08:45

## 2024-02-17 RX ADMIN — Medication 400 MG: at 21:07

## 2024-02-17 RX ADMIN — MUPIROCIN: 20 OINTMENT TOPICAL at 08:45

## 2024-02-17 RX ADMIN — ASPIRIN 81 MG: 81 TABLET, COATED ORAL at 08:45

## 2024-02-17 RX ADMIN — DOCUSATE SODIUM AND SENNOSIDES 1 TABLET: 8.6; 5 TABLET, FILM COATED ORAL at 08:44

## 2024-02-17 RX ADMIN — OXYCODONE 5 MG: 5 TABLET ORAL at 12:31

## 2024-02-17 RX ADMIN — POTASSIUM CHLORIDE 20 MEQ: 1500 TABLET, EXTENDED RELEASE ORAL at 08:52

## 2024-02-17 RX ADMIN — INSULIN LISPRO 6 UNITS: 100 INJECTION, SOLUTION INTRAVENOUS; SUBCUTANEOUS at 12:31

## 2024-02-17 RX ADMIN — SODIUM CHLORIDE, PRESERVATIVE FREE 10 ML: 5 INJECTION INTRAVENOUS at 08:45

## 2024-02-17 RX ADMIN — AMIODARONE HYDROCHLORIDE 400 MG: 200 TABLET ORAL at 21:07

## 2024-02-17 RX ADMIN — FUROSEMIDE 40 MG: 10 INJECTION, SOLUTION INTRAMUSCULAR; INTRAVENOUS at 17:24

## 2024-02-17 RX ADMIN — 0.12% CHLORHEXIDINE GLUCONATE 15 ML: 1.2 RINSE ORAL at 21:07

## 2024-02-17 RX ADMIN — SODIUM CHLORIDE, PRESERVATIVE FREE 10 ML: 5 INJECTION INTRAVENOUS at 20:56

## 2024-02-17 RX ADMIN — FAMOTIDINE 20 MG: 20 TABLET, FILM COATED ORAL at 21:07

## 2024-02-17 RX ADMIN — OXYCODONE 5 MG: 5 TABLET ORAL at 04:22

## 2024-02-17 RX ADMIN — CARVEDILOL 25 MG: 12.5 TABLET, FILM COATED ORAL at 08:45

## 2024-02-17 RX ADMIN — INSULIN GLARGINE 40 UNITS: 100 INJECTION, SOLUTION SUBCUTANEOUS at 08:45

## 2024-02-17 RX ADMIN — ACETAMINOPHEN 975 MG: 325 TABLET ORAL at 12:31

## 2024-02-17 RX ADMIN — CARVEDILOL 25 MG: 12.5 TABLET, FILM COATED ORAL at 17:24

## 2024-02-17 RX ADMIN — ACETAMINOPHEN 975 MG: 325 TABLET ORAL at 17:24

## 2024-02-17 RX ADMIN — POTASSIUM CHLORIDE 20 MEQ: 1500 TABLET, EXTENDED RELEASE ORAL at 21:07

## 2024-02-17 RX ADMIN — 0.12% CHLORHEXIDINE GLUCONATE 15 ML: 1.2 RINSE ORAL at 08:44

## 2024-02-17 RX ADMIN — FAMOTIDINE 20 MG: 20 TABLET, FILM COATED ORAL at 08:44

## 2024-02-17 RX ADMIN — FUROSEMIDE 40 MG: 10 INJECTION, SOLUTION INTRAMUSCULAR; INTRAVENOUS at 08:52

## 2024-02-17 RX ADMIN — DOCUSATE SODIUM AND SENNOSIDES 1 TABLET: 8.6; 5 TABLET, FILM COATED ORAL at 21:07

## 2024-02-17 RX ADMIN — AMLODIPINE BESYLATE 5 MG: 5 TABLET ORAL at 08:44

## 2024-02-17 RX ADMIN — INSULIN LISPRO 6 UNITS: 100 INJECTION, SOLUTION INTRAVENOUS; SUBCUTANEOUS at 08:45

## 2024-02-17 RX ADMIN — ALBUTEROL SULFATE 2.5 MG: 2.5 SOLUTION RESPIRATORY (INHALATION) at 22:04

## 2024-02-17 ASSESSMENT — PAIN SCALES - GENERAL
PAINLEVEL_OUTOF10: 4
PAINLEVEL_OUTOF10: 2
PAINLEVEL_OUTOF10: 0
PAINLEVEL_OUTOF10: 0

## 2024-02-17 ASSESSMENT — PAIN DESCRIPTION - LOCATION: LOCATION: INCISION

## 2024-02-17 ASSESSMENT — PAIN DESCRIPTION - PAIN TYPE: TYPE: SURGICAL PAIN

## 2024-02-18 ENCOUNTER — APPOINTMENT (OUTPATIENT)
Facility: HOSPITAL | Age: 64
DRG: 235 | End: 2024-02-18
Attending: THORACIC SURGERY (CARDIOTHORACIC VASCULAR SURGERY)
Payer: MEDICARE

## 2024-02-18 LAB
ANION GAP SERPL CALC-SCNC: 3 MMOL/L (ref 5–15)
BUN SERPL-MCNC: 47 MG/DL (ref 6–20)
BUN/CREAT SERPL: 40 (ref 12–20)
CALCIUM SERPL-MCNC: 9.9 MG/DL (ref 8.5–10.1)
CHLORIDE SERPL-SCNC: 101 MMOL/L (ref 97–108)
CO2 SERPL-SCNC: 35 MMOL/L (ref 21–32)
CREAT SERPL-MCNC: 1.17 MG/DL (ref 0.7–1.3)
ERYTHROCYTE [DISTWIDTH] IN BLOOD BY AUTOMATED COUNT: 14.2 % (ref 11.5–14.5)
GLUCOSE BLD STRIP.AUTO-MCNC: 103 MG/DL (ref 65–117)
GLUCOSE BLD STRIP.AUTO-MCNC: 112 MG/DL (ref 65–117)
GLUCOSE BLD STRIP.AUTO-MCNC: 120 MG/DL (ref 65–117)
GLUCOSE BLD STRIP.AUTO-MCNC: 132 MG/DL (ref 65–117)
GLUCOSE SERPL-MCNC: 115 MG/DL (ref 65–100)
HCT VFR BLD AUTO: 31.4 % (ref 36.6–50.3)
HGB BLD-MCNC: 10 G/DL (ref 12.1–17)
MAGNESIUM SERPL-MCNC: 3.1 MG/DL (ref 1.6–2.4)
MCH RBC QN AUTO: 28.9 PG (ref 26–34)
MCHC RBC AUTO-ENTMCNC: 31.8 G/DL (ref 30–36.5)
MCV RBC AUTO: 90.8 FL (ref 80–99)
NRBC # BLD: 0 K/UL (ref 0–0.01)
NRBC BLD-RTO: 0 PER 100 WBC
PLATELET # BLD AUTO: 114 K/UL (ref 150–400)
POTASSIUM SERPL-SCNC: 3.9 MMOL/L (ref 3.5–5.1)
RBC # BLD AUTO: 3.46 M/UL (ref 4.1–5.7)
SERVICE CMNT-IMP: ABNORMAL
SERVICE CMNT-IMP: ABNORMAL
SERVICE CMNT-IMP: NORMAL
SERVICE CMNT-IMP: NORMAL
SODIUM SERPL-SCNC: 139 MMOL/L (ref 136–145)
WBC # BLD AUTO: 7.3 K/UL (ref 4.1–11.1)

## 2024-02-18 PROCEDURE — 80048 BASIC METABOLIC PNL TOTAL CA: CPT

## 2024-02-18 PROCEDURE — 36415 COLL VENOUS BLD VENIPUNCTURE: CPT

## 2024-02-18 PROCEDURE — 2580000003 HC RX 258: Performed by: NURSE PRACTITIONER

## 2024-02-18 PROCEDURE — 6370000000 HC RX 637 (ALT 250 FOR IP): Performed by: NURSE PRACTITIONER

## 2024-02-18 PROCEDURE — 6360000002 HC RX W HCPCS: Performed by: NURSE PRACTITIONER

## 2024-02-18 PROCEDURE — 82962 GLUCOSE BLOOD TEST: CPT

## 2024-02-18 PROCEDURE — 97116 GAIT TRAINING THERAPY: CPT

## 2024-02-18 PROCEDURE — 2060000000 HC ICU INTERMEDIATE R&B

## 2024-02-18 PROCEDURE — 6370000000 HC RX 637 (ALT 250 FOR IP): Performed by: CLINICAL NURSE SPECIALIST

## 2024-02-18 PROCEDURE — 71045 X-RAY EXAM CHEST 1 VIEW: CPT

## 2024-02-18 PROCEDURE — 94640 AIRWAY INHALATION TREATMENT: CPT

## 2024-02-18 PROCEDURE — 97110 THERAPEUTIC EXERCISES: CPT

## 2024-02-18 PROCEDURE — 83735 ASSAY OF MAGNESIUM: CPT

## 2024-02-18 PROCEDURE — 85027 COMPLETE CBC AUTOMATED: CPT

## 2024-02-18 RX ORDER — FUROSEMIDE 10 MG/ML
40 INJECTION INTRAMUSCULAR; INTRAVENOUS 2 TIMES DAILY
Status: COMPLETED | OUTPATIENT
Start: 2024-02-18 | End: 2024-02-18

## 2024-02-18 RX ORDER — POTASSIUM CHLORIDE 20 MEQ/1
40 TABLET, EXTENDED RELEASE ORAL 2 TIMES DAILY
Status: COMPLETED | OUTPATIENT
Start: 2024-02-18 | End: 2024-02-18

## 2024-02-18 RX ADMIN — POTASSIUM CHLORIDE 40 MEQ: 1500 TABLET, EXTENDED RELEASE ORAL at 20:46

## 2024-02-18 RX ADMIN — ATORVASTATIN CALCIUM 80 MG: 40 TABLET, FILM COATED ORAL at 08:28

## 2024-02-18 RX ADMIN — MUPIROCIN: 20 OINTMENT TOPICAL at 20:46

## 2024-02-18 RX ADMIN — FUROSEMIDE 40 MG: 10 INJECTION, SOLUTION INTRAMUSCULAR; INTRAVENOUS at 08:28

## 2024-02-18 RX ADMIN — SODIUM CHLORIDE, PRESERVATIVE FREE 10 ML: 5 INJECTION INTRAVENOUS at 20:47

## 2024-02-18 RX ADMIN — Medication 400 MG: at 20:46

## 2024-02-18 RX ADMIN — AMIODARONE HYDROCHLORIDE 400 MG: 200 TABLET ORAL at 20:46

## 2024-02-18 RX ADMIN — FAMOTIDINE 20 MG: 20 TABLET, FILM COATED ORAL at 20:46

## 2024-02-18 RX ADMIN — FUROSEMIDE 40 MG: 10 INJECTION, SOLUTION INTRAMUSCULAR; INTRAVENOUS at 17:52

## 2024-02-18 RX ADMIN — DOCUSATE SODIUM AND SENNOSIDES 1 TABLET: 8.6; 5 TABLET, FILM COATED ORAL at 20:46

## 2024-02-18 RX ADMIN — ASPIRIN 81 MG: 81 TABLET, COATED ORAL at 08:28

## 2024-02-18 RX ADMIN — SODIUM CHLORIDE, PRESERVATIVE FREE 10 ML: 5 INJECTION INTRAVENOUS at 08:29

## 2024-02-18 RX ADMIN — ACETAMINOPHEN 975 MG: 325 TABLET ORAL at 17:52

## 2024-02-18 RX ADMIN — CARVEDILOL 25 MG: 12.5 TABLET, FILM COATED ORAL at 08:28

## 2024-02-18 RX ADMIN — ACETAMINOPHEN 975 MG: 325 TABLET ORAL at 23:00

## 2024-02-18 RX ADMIN — INSULIN GLARGINE 40 UNITS: 100 INJECTION, SOLUTION SUBCUTANEOUS at 08:27

## 2024-02-18 RX ADMIN — AMLODIPINE BESYLATE 5 MG: 5 TABLET ORAL at 08:28

## 2024-02-18 RX ADMIN — CARVEDILOL 25 MG: 12.5 TABLET, FILM COATED ORAL at 17:52

## 2024-02-18 RX ADMIN — ACETAMINOPHEN 975 MG: 325 TABLET ORAL at 07:02

## 2024-02-18 RX ADMIN — AMIODARONE HYDROCHLORIDE 400 MG: 200 TABLET ORAL at 08:28

## 2024-02-18 RX ADMIN — FAMOTIDINE 20 MG: 20 TABLET, FILM COATED ORAL at 08:28

## 2024-02-18 RX ADMIN — MUPIROCIN: 20 OINTMENT TOPICAL at 08:28

## 2024-02-18 RX ADMIN — POLYETHYLENE GLYCOL 3350 17 G: 17 POWDER, FOR SOLUTION ORAL at 08:27

## 2024-02-18 RX ADMIN — 0.12% CHLORHEXIDINE GLUCONATE 15 ML: 1.2 RINSE ORAL at 08:28

## 2024-02-18 RX ADMIN — INSULIN LISPRO 6 UNITS: 100 INJECTION, SOLUTION INTRAVENOUS; SUBCUTANEOUS at 08:27

## 2024-02-18 RX ADMIN — DOCUSATE SODIUM AND SENNOSIDES 1 TABLET: 8.6; 5 TABLET, FILM COATED ORAL at 08:28

## 2024-02-18 RX ADMIN — POTASSIUM CHLORIDE 40 MEQ: 1500 TABLET, EXTENDED RELEASE ORAL at 08:28

## 2024-02-18 RX ADMIN — 0.12% CHLORHEXIDINE GLUCONATE 15 ML: 1.2 RINSE ORAL at 20:46

## 2024-02-18 RX ADMIN — ALBUTEROL SULFATE 2.5 MG: 2.5 SOLUTION RESPIRATORY (INHALATION) at 04:39

## 2024-02-18 ASSESSMENT — PAIN SCALES - GENERAL
PAINLEVEL_OUTOF10: 0

## 2024-02-19 ENCOUNTER — APPOINTMENT (OUTPATIENT)
Facility: HOSPITAL | Age: 64
DRG: 235 | End: 2024-02-19
Attending: THORACIC SURGERY (CARDIOTHORACIC VASCULAR SURGERY)
Payer: MEDICARE

## 2024-02-19 VITALS
OXYGEN SATURATION: 97 % | SYSTOLIC BLOOD PRESSURE: 124 MMHG | HEART RATE: 76 BPM | DIASTOLIC BLOOD PRESSURE: 100 MMHG | TEMPERATURE: 98.1 F | HEIGHT: 73 IN | WEIGHT: 301 LBS | RESPIRATION RATE: 18 BRPM | BODY MASS INDEX: 39.89 KG/M2

## 2024-02-19 LAB
ANION GAP SERPL CALC-SCNC: 5 MMOL/L (ref 5–15)
BUN SERPL-MCNC: 47 MG/DL (ref 6–20)
BUN/CREAT SERPL: 40 (ref 12–20)
CALCIUM SERPL-MCNC: 9.8 MG/DL (ref 8.5–10.1)
CHLORIDE SERPL-SCNC: 100 MMOL/L (ref 97–108)
CO2 SERPL-SCNC: 35 MMOL/L (ref 21–32)
CREAT SERPL-MCNC: 1.18 MG/DL (ref 0.7–1.3)
ECHO BSA: 2.65 M2
ECHO LA DIAMETER INDEX: 1.8 CM/M2
ECHO LA DIAMETER: 4.6 CM
ECHO LV EDV A2C: 138 ML
ECHO LV EDV A4C: 118 ML
ECHO LV EDV BP: 130 ML (ref 67–155)
ECHO LV EDV INDEX A4C: 46 ML/M2
ECHO LV EDV INDEX BP: 51 ML/M2
ECHO LV EDV NDEX A2C: 54 ML/M2
ECHO LV EJECTION FRACTION A2C: 67 %
ECHO LV EJECTION FRACTION A4C: 53 %
ECHO LV EJECTION FRACTION BIPLANE: 59 % (ref 55–100)
ECHO LV ESV A2C: 46 ML
ECHO LV ESV A4C: 56 ML
ECHO LV ESV BP: 53 ML (ref 22–58)
ECHO LV ESV INDEX A2C: 18 ML/M2
ECHO LV ESV INDEX A4C: 22 ML/M2
ECHO LV ESV INDEX BP: 21 ML/M2
ECHO LV FRACTIONAL SHORTENING: 16 % (ref 28–44)
ECHO LV INTERNAL DIMENSION DIASTOLE INDEX: 2.46 CM/M2
ECHO LV INTERNAL DIMENSION DIASTOLIC: 6.3 CM (ref 4.2–5.9)
ECHO LV INTERNAL DIMENSION SYSTOLIC INDEX: 2.07 CM/M2
ECHO LV INTERNAL DIMENSION SYSTOLIC: 5.3 CM
ECHO LV IVSD: 1.2 CM (ref 0.6–1)
ECHO LV MASS 2D: 340.4 G (ref 88–224)
ECHO LV MASS INDEX 2D: 133 G/M2 (ref 49–115)
ECHO LV POSTERIOR WALL DIASTOLIC: 1.2 CM (ref 0.6–1)
ECHO LV RELATIVE WALL THICKNESS RATIO: 0.38
ECHO PULMONARY ARTERY END DIASTOLIC PRESSURE: 5 MMHG
ECHO PV MAX VELOCITY: 1 M/S
ECHO PV PEAK GRADIENT: 4 MMHG
ECHO PV REGURGITANT MAX VELOCITY: 1.1 M/S
ERYTHROCYTE [DISTWIDTH] IN BLOOD BY AUTOMATED COUNT: 14.3 % (ref 11.5–14.5)
GLUCOSE BLD STRIP.AUTO-MCNC: 125 MG/DL (ref 65–117)
GLUCOSE BLD STRIP.AUTO-MCNC: 142 MG/DL (ref 65–117)
GLUCOSE SERPL-MCNC: 130 MG/DL (ref 65–100)
HCT VFR BLD AUTO: 32.7 % (ref 36.6–50.3)
HGB BLD-MCNC: 10.2 G/DL (ref 12.1–17)
MAGNESIUM SERPL-MCNC: 2.8 MG/DL (ref 1.6–2.4)
MCH RBC QN AUTO: 29.4 PG (ref 26–34)
MCHC RBC AUTO-ENTMCNC: 31.2 G/DL (ref 30–36.5)
MCV RBC AUTO: 94.2 FL (ref 80–99)
NRBC # BLD: 0 K/UL (ref 0–0.01)
NRBC BLD-RTO: 0 PER 100 WBC
PLATELET # BLD AUTO: 143 K/UL (ref 150–400)
POTASSIUM SERPL-SCNC: 3.7 MMOL/L (ref 3.5–5.1)
RBC # BLD AUTO: 3.47 M/UL (ref 4.1–5.7)
SERVICE CMNT-IMP: ABNORMAL
SERVICE CMNT-IMP: ABNORMAL
SODIUM SERPL-SCNC: 140 MMOL/L (ref 136–145)
WBC # BLD AUTO: 6.6 K/UL (ref 4.1–11.1)

## 2024-02-19 PROCEDURE — 36415 COLL VENOUS BLD VENIPUNCTURE: CPT

## 2024-02-19 PROCEDURE — 80048 BASIC METABOLIC PNL TOTAL CA: CPT

## 2024-02-19 PROCEDURE — 6370000000 HC RX 637 (ALT 250 FOR IP): Performed by: NURSE PRACTITIONER

## 2024-02-19 PROCEDURE — 6370000000 HC RX 637 (ALT 250 FOR IP)

## 2024-02-19 PROCEDURE — 97535 SELF CARE MNGMENT TRAINING: CPT

## 2024-02-19 PROCEDURE — 97116 GAIT TRAINING THERAPY: CPT

## 2024-02-19 PROCEDURE — 6370000000 HC RX 637 (ALT 250 FOR IP): Performed by: CLINICAL NURSE SPECIALIST

## 2024-02-19 PROCEDURE — 71045 X-RAY EXAM CHEST 1 VIEW: CPT

## 2024-02-19 PROCEDURE — 82962 GLUCOSE BLOOD TEST: CPT

## 2024-02-19 PROCEDURE — 2580000003 HC RX 258: Performed by: NURSE PRACTITIONER

## 2024-02-19 PROCEDURE — 83735 ASSAY OF MAGNESIUM: CPT

## 2024-02-19 PROCEDURE — 6360000004 HC RX CONTRAST MEDICATION: Performed by: THORACIC SURGERY (CARDIOTHORACIC VASCULAR SURGERY)

## 2024-02-19 PROCEDURE — 85027 COMPLETE CBC AUTOMATED: CPT

## 2024-02-19 PROCEDURE — C8924 2D TTE W OR W/O FOL W/CON,FU: HCPCS

## 2024-02-19 RX ORDER — POTASSIUM CHLORIDE 20 MEQ/1
40 TABLET, EXTENDED RELEASE ORAL DAILY
Status: DISCONTINUED | OUTPATIENT
Start: 2024-02-19 | End: 2024-02-19 | Stop reason: HOSPADM

## 2024-02-19 RX ORDER — FUROSEMIDE 40 MG/1
40 TABLET ORAL DAILY
Qty: 5 TABLET | Refills: 0 | Status: SHIPPED | OUTPATIENT
Start: 2024-02-19 | End: 2024-02-24

## 2024-02-19 RX ORDER — FUROSEMIDE 40 MG/1
40 TABLET ORAL DAILY
Status: DISCONTINUED | OUTPATIENT
Start: 2024-02-19 | End: 2024-02-19 | Stop reason: HOSPADM

## 2024-02-19 RX ORDER — FUROSEMIDE 40 MG/1
40 TABLET ORAL DAILY
Qty: 60 TABLET | Refills: 3 | Status: SHIPPED | OUTPATIENT
Start: 2024-02-19 | End: 2024-02-19 | Stop reason: HOSPADM

## 2024-02-19 RX ORDER — POLYETHYLENE GLYCOL 3350 17 G/17G
17 POWDER, FOR SOLUTION ORAL DAILY
Qty: 14 PACKET | Refills: 0 | Status: SHIPPED | OUTPATIENT
Start: 2024-02-19 | End: 2024-03-04

## 2024-02-19 RX ORDER — DIPHENHYDRAMINE HCL 25 MG
25 CAPSULE ORAL EVERY 6 HOURS PRN
Status: SHIPPED | COMMUNITY
Start: 2024-02-19 | End: 2024-02-29

## 2024-02-19 RX ORDER — AMIODARONE HYDROCHLORIDE 200 MG/1
TABLET ORAL
Qty: 84 TABLET | Refills: 0 | Status: SHIPPED | OUTPATIENT
Start: 2024-02-19

## 2024-02-19 RX ORDER — ACETAMINOPHEN 325 MG/1
975 TABLET ORAL EVERY 6 HOURS PRN
Status: SHIPPED | COMMUNITY
Start: 2024-02-19

## 2024-02-19 RX ORDER — LIDOCAINE 4 G/G
2 PATCH TOPICAL DAILY
Qty: 10 EACH | Refills: 0 | Status: SHIPPED | OUTPATIENT
Start: 2024-02-19 | End: 2024-02-24

## 2024-02-19 RX ORDER — OXYCODONE HYDROCHLORIDE 5 MG/1
5 TABLET ORAL EVERY 8 HOURS PRN
Qty: 10 TABLET | Refills: 0 | Status: SHIPPED | OUTPATIENT
Start: 2024-02-19 | End: 2024-02-22

## 2024-02-19 RX ORDER — SENNA AND DOCUSATE SODIUM 50; 8.6 MG/1; MG/1
1 TABLET, FILM COATED ORAL 2 TIMES DAILY
Qty: 28 TABLET | Refills: 0 | Status: SHIPPED | OUTPATIENT
Start: 2024-02-19 | End: 2024-03-04

## 2024-02-19 RX ORDER — POTASSIUM CHLORIDE 20 MEQ/1
40 TABLET, EXTENDED RELEASE ORAL DAILY
Qty: 10 TABLET | Refills: 0 | Status: SHIPPED | OUTPATIENT
Start: 2024-02-19 | End: 2024-02-24

## 2024-02-19 RX ADMIN — FUROSEMIDE 40 MG: 40 TABLET ORAL at 11:04

## 2024-02-19 RX ADMIN — AMIODARONE HYDROCHLORIDE 400 MG: 200 TABLET ORAL at 09:35

## 2024-02-19 RX ADMIN — MUPIROCIN: 20 OINTMENT TOPICAL at 09:36

## 2024-02-19 RX ADMIN — ASPIRIN 81 MG: 81 TABLET, COATED ORAL at 09:35

## 2024-02-19 RX ADMIN — PERFLUTREN 1.5 ML: 6.52 INJECTION, SUSPENSION INTRAVENOUS at 09:12

## 2024-02-19 RX ADMIN — ACETAMINOPHEN 975 MG: 325 TABLET ORAL at 12:11

## 2024-02-19 RX ADMIN — SODIUM CHLORIDE, PRESERVATIVE FREE 10 ML: 5 INJECTION INTRAVENOUS at 09:36

## 2024-02-19 RX ADMIN — CARVEDILOL 25 MG: 12.5 TABLET, FILM COATED ORAL at 09:35

## 2024-02-19 RX ADMIN — POLYETHYLENE GLYCOL 3350 17 G: 17 POWDER, FOR SOLUTION ORAL at 09:34

## 2024-02-19 RX ADMIN — ATORVASTATIN CALCIUM 80 MG: 40 TABLET, FILM COATED ORAL at 09:35

## 2024-02-19 RX ADMIN — AMLODIPINE BESYLATE 5 MG: 5 TABLET ORAL at 09:35

## 2024-02-19 RX ADMIN — INSULIN LISPRO 2 UNITS: 100 INJECTION, SOLUTION INTRAVENOUS; SUBCUTANEOUS at 12:10

## 2024-02-19 RX ADMIN — Medication 400 MG: at 09:35

## 2024-02-19 RX ADMIN — POTASSIUM CHLORIDE 40 MEQ: 1500 TABLET, EXTENDED RELEASE ORAL at 11:04

## 2024-02-19 RX ADMIN — ACETAMINOPHEN 975 MG: 325 TABLET ORAL at 05:46

## 2024-02-19 RX ADMIN — INSULIN LISPRO 6 UNITS: 100 INJECTION, SOLUTION INTRAVENOUS; SUBCUTANEOUS at 09:34

## 2024-02-19 RX ADMIN — INSULIN LISPRO 6 UNITS: 100 INJECTION, SOLUTION INTRAVENOUS; SUBCUTANEOUS at 12:10

## 2024-02-19 RX ADMIN — DOCUSATE SODIUM AND SENNOSIDES 1 TABLET: 8.6; 5 TABLET, FILM COATED ORAL at 09:35

## 2024-02-19 RX ADMIN — 0.12% CHLORHEXIDINE GLUCONATE 15 ML: 1.2 RINSE ORAL at 09:35

## 2024-02-19 RX ADMIN — INSULIN GLARGINE 40 UNITS: 100 INJECTION, SOLUTION SUBCUTANEOUS at 09:34

## 2024-02-19 NOTE — DISCHARGE SUMMARY
[facility] Discharge Summary     Patient ID:  Rashad Richter  [id number]   63 y.o.  1960    Admit date: 2/13/2024    Discharge date: 2/19/2024     Admitting Physician: Clayton Tomlin MD     Referring Cardiologist:      PCP:  Maciel Laguna MD    Admitting Diagnoses:  CAD s/p stents  Acute on chronic HFrEF  DM II (new diagnosis)  HTN  HLD  Thrombocytopenia  Occluded right internal carotid artery, mild left sided carotid stenosis  GERD  Chronic constipation  Hyperthyroidism  Hx TIA  Obesity    Discharge Diagnoses:   Same as above s/p CABG        Discharged Condition: Stable    Disposition: home, see patient instructions for treatment and plan    Procedures for this admission:  Procedure(s):  ON PUMP CABG CORONARY ARTERY BYPASS GRAFT x 2 WITH LEFT RADIAL ARTERY HARVEST, LEFT INTERNAL MAMMARY ARTERY HARVEST, ELIJAH AND EPI AORTIC BY DR STROUD (NO PAC)    Discharge Medications:      Medication List        START taking these medications      amiodarone 200 MG tablet  Commonly known as: CORDARONE  Take two tablets twice daily x 2 weeks, then take two tablets once daily x 2 weeks, then STOP     blood glucose monitor kit and supplies  Check blood sugar twice daily, before breakfast and dinner     blood glucose test strips  Test glucose two times daily: Before eating and drinking anything in the morning and before dinner.     diphenhydrAMINE 25 MG capsule  Commonly known as: BENADRYL  Take 1 capsule by mouth every 6 hours as needed for Itching or Allergies     Lancets Misc  1 each by Does not apply route daily     lidocaine 4 % external patch  Apply 2 patches topically daily for 5 days     metFORMIN 500 MG extended release tablet  Commonly known as: GLUCOPHAGE-XR  Take 1 tablet by mouth daily (with breakfast)     oxyCODONE 5 MG immediate release tablet  Commonly known as: ROXICODONE  Take 1 tablet by mouth every 8 hours as needed for Pain (pain unresponsive to Tylenol) for up to 3 days. Max Daily Amount: 15 mg

## 2024-02-19 NOTE — PROCEDURES
Pacer wire dressing removed, site cleaned with CHG, sutures x 1 cut, pacer wire x 1 cut at the level of his skin with gentle traction due to facility protocol. Pt tolerated well. RN updated.

## 2024-02-19 NOTE — DIABETES MGMT
[facility]  DIABETES MANAGEMENT CONSULT    Consulted by Provider for advanced nursing evaluation and care for inpatient blood glucose management.    Evaluation and Action Plan   Rashad Richter is a 63 year old male patient with new onset diabetes. His current A1c is 8.2%. His A1c in December 2023 was 7.5%. The patient is not currently on any medication for diabetes. He is admitted for a scheduled CABG x3 procedure. He will start on the insulin infusion per cardiac surgery protocol and likely remain 48 hours. I have explained this to the patient and his family. During that time, Diabetes management will monitor BG trends and insulin usage and determine if diabetes pharmacological intervention is appropriate transitioning off the insulin and at discharge.     Management Rationale Action Plan   Medication   Basal needs Will start on the insulin infusion tomorrow Likely to remain for 48 hours per cardiac surgery protocol   Additional orders          Diabetes Discharge Plan   Medication  TBD   Referral  [x]        Outpatient diabetes education   Additional orders            Initial Presentation   Rashad Richter is a 63 y.o. male admitted  after experiencing increasing chest pain with exertion. After cardiac diagnostic testing and imaging, he was recommended for CABG surgery. Hx of CAD and cardiac stents.   LAB: glucose 243. A1c 8.2%  Procedures    Left heart cath / coronary angiography   Left ventriculography   Ultrasound guided vascular access         Indications    Atherosclerotic heart disease of native coronary artery with unstable angina pectoris (HCC) [I25.110 (ICD-10-CM)]     Link to Procedure Log    Procedure Log     PACS Images     Show images for Cardiac procedure  Cath/EP Waveforms    Cath/EP waveform scan report            Conclusion         3 vessel and mod Left Main coronary artery disease.  95% restenosis of LAD stent placed 8 months ago.  Progression in Cx and RCA 
[facility]  DIABETES MANAGEMENT CONSULT    Consulted by Provider for advanced nursing evaluation and care for inpatient blood glucose management.    Evaluation and Action Plan   Rashad Richter is a 63 year old obese gentleman, with no known history of diabetes, who was admitted with multivessel CAD now s/p CABG x3.  Admitting A1C was elevated at 8.5% indicating a new diagnosis of Type 2 Diabetes. His blood glucose now well controlled on insulin infusion via post-operative protocol but with elevated insulin rates.  We will transition him off the insulin infusion and will utilize SQ basal/bolus insulin until able to transition discharge home.     The patient is doing well today. BG's are stable. The patient will be discharged on Trulicity and Metformin. The patient is encouraged to monitor BG's and log.The patient understands symptoms hypoglycemia and hyperglycemia. The patient's spouse also has diabetes and is supportive. The patient will follow-up with his PCP for future diabetes management.         Diabetes Discharge Plan   Medication  Newly diagnosed Type 2 Diabetes.  A1C 8.5%  Start Metformin ER 500mg BID  Evidence based guidelines support starting an GLP-1 for any person with diabetes and ASCVD regardless of A1C.  Will start Ozempic 0.25mg once weekly SQ.  Have advised patients that depending on insurance coverage, GLP-1 medications may be expensive and to NOT fill if they are not able to afford out of pocket cost.  In that case, they will reach out to provider to discuss alternative options.    Referral  [x]        Outpatient diabetes education: Order placed   Additional orders  Will need a FUV with PCP within 1-2 weeks after hospital discharge for ongoing diabetes management   Prescription for glucometer kit (Meter, Lancets (100), Strips (100)).  Patient to obtain a blood glucose reading two times daily.  First thing in the morning prior to eating and drinking anything then 
eating plan; given handouts  Role of physical activity in improving insulin sensitivity and action  Procedure for blood glucose monitoring & options for low-cost products  Medications plan at discharge     Billing Code(s)   [] 96129 IP subsequent hospital care - 50 minutes   [] 43760 IP subsequent hospital care - 35 minutes   [x] 09402 IP subsequent hospital care - 25 minutes   [] 64117 IP initial hospital care - 40 minutes     Before making these care recommendations, I personally reviewed the hospitalization record, including notes, laboratory & diagnostic data and current medications, and examined the patient at the bedside (circumstances permitting) before determining care. More than fifty (50) percent of the time was spent in patient counseling and/or care coordination.  Total minutes: 25 minutes    TOM Wilkinson  Diabetes Clinical Nurse Specialist  [facility]  Access via [employer]   
goals  Healthy Plate eating plan; given handouts and discussed  Role of physical activity in improving insulin sensitivity and action  Procedure for blood glucose monitoring  Medications plan at discharge, patient did simulate Trulicity injection     Billing Code(s)   [x] 73231 IP subsequent hospital care - 50 minutes     Before making these care recommendations, I personally reviewed the hospitalization record, including notes, laboratory & diagnostic data and current medications, and examined the patient at the bedside (circumstances permitting) before determining care. More than fifty (50) percent of the time was spent in patient counseling and/or care coordination.  Total minutes: 50    TOM Minor - CNS  Diabetes Clinical Nurse Specialist  [facility]  Access via [employer]

## 2024-02-19 NOTE — PROGRESS NOTES
Critical Care Progress Note  Tamika Corado MD          Date of Service:  2/15/2024  NAME:  Rashad Richter  :  1960  MRN:  [id number]      Subjective/Hospital course:      2/15: Patient reports feeling better. No acute events overnight.          Assessment/Plan:     Post cardiac surgery care: S/P CABGx2  - Hemodynamic management per CT surgery postoperative guidelines.   - ASA and statin for CAD per CTS.  - BB when tolerated.  - Insulin for glycemic control.     Vasoplegia  - Wean pressors and inotropes as able.   - hemodynamic management in collaboration with CT surgery     Pulmonary insufficiency:  - Post-extubation pulmonary toilette with adequate postop pain control.   - Early mobilization with PT/OT as able     Anemia secondary to acute blood loss  - Follow CT output for now.   - Follow CBC   - Supportive transfusions if needed.      Thrombocytopenia  - follow CBC for now.   - No significant clinical bleeding.      Prophylaxis  - DVT and SUP prophylaxis  - VAP bundle with chlorhexidine      Care Plan discussed with: Patient/Family and Nurse      I personally spent 00 minutes of critical care time.  This is time spent at this critically ill patient's bedside actively involved in patient care as well as the coordination of care and discussions with the patient's family.  This does not include any procedural time which has been billed separately.      Review of Systems:   Review of Systems   All other systems reviewed and are negative.           Vital Signs:   Patient Vitals for the past 4 hrs:   BP Temp Temp src Pulse Resp SpO2   02/15/24 1605 -- -- -- 93 25 98 %   02/15/24 1600 (!) 149/77 98.4 °F (36.9 °C) Bladder 85 30 95 %   02/15/24 1455 -- -- -- -- 16 --   02/15/24 1425 -- -- -- -- 25 --   02/15/24 1400 (!) 141/93 98.4 °F (36.9 °C) -- 84 24 92 %   02/15/24 1300 137/79 98.4 °F (36.9 °C) -- 85 25 96 %        Intake/Output Summary (Last 24 hours) at 2/15/2024 8351  Last data filed at 
  Physician Progress Note      PATIENT:               ADRIA OSORIO  CSN #:                  [id number]  :                       1960  ADMIT DATE:       2024 10:25 AM  DISCH DATE:  RESPONDING  PROVIDER #:        Clayton Tomlin MD          QUERY TEXT:    63yoM patient admitted with severe multivessel CAD with severe instent   restenosis. Noted documentation of Acute on chronic HFrEF (NYHA class III): EF   45%-50 % on echo 2023 on H&P & concurrent notes. In order to support the   diagnosis of Acute CHF, please include additional clinical indicators in your   documentation.  Or please document if the diagnosis of Acute CHF has been   ruled out after further study.    The medical record reflects the following:  Risk Factors: severe multivessel CAD, HTN, chronic HFrEF EF 45-50% on Echo   2023, occluded R ICA, obesity  Clinical Indicators: pt reports worsening SOB with exertion. Increasing   frequency. He denies orthopnea, claudication, dizziness.  Lungs CTA   bilaterally + dyspnea, no JVD.  pBNP 172.  Treatment: CABG, on coreg, lasix PTA, statin.    Thank you,  Sharlene Santa RN, CDI  Options provided:  -- Acute on chronic systolic CHF currently as evidenced by, Please document   evidence.  -- Acute on chronic systolic CHF ruled out after study and chronic systolic   CHF confirmed  -- Other - I will add my own diagnosis  -- Disagree - Not applicable / Not valid  -- Disagree - Clinically unable to determine / Unknown  -- Refer to Clinical Documentation Reviewer    PROVIDER RESPONSE TEXT:    This patient is in acute pm chronic HFrEF as evidenced by    Query created by: Sharlene Santa on 2/15/2024 3:14 PM      Electronically signed by:  Clayton Tomlin MD 2024 4:00 PM          
0700  Bedside and verbal report from Moriah Howe RN    0800  Assessment completed. Ann Perez NP here to see patient.     0803  Medicated with roxicodone 10 mg po for complaints of post op chest discomfort.     0817  LR stopped per Ann Perez NP    0900  Took diet well. Carb coverage per insulin orders.    0930 Dr. Tomlin and heart team here to see patient. Plan for today: Pulmonary toilet, increase activity, pain control, diurese, remove hayes catheter.  KEEP MAC, DO NOT REMOVE.     0935  PT and OT here with patient. Walked in hallway with therapists.     1100  Continues up in the chair. Scheduled lasix 40 mg IV given.     1132  Medicated with roxicodone 10 mg po for complaints of post op chest discomfort. Lantus 40 units SQ given for transition off insulin drip, per recommendation/orders from diabetes management NP Julissa Scott.    1200  Reassessment completed. PIV removed from right forearm. Dressing change completed to right IJ MAC site.    1300  Took diet fair.     1333  Insulin drip stopped per protocol.    1312  Hayes removed.    1400  In chair, napping at intervals.    1530  Assisted to bathroom, passed only flatus and voided moderate amount yellow urine in toilet.    1545  Assisted to bed with one assist. Family member at bedside.     1600  Reassessment completed. BP now 158/106.  Will give scheduled 1700 dose of coreg 12.5 mg po.     1610  Medicated with roxicodone 10 mg po per patient request for post op pain control.     1700  BP now 140/86.  Patient napping at intervals. Wakens easily to name called.     1800  Patient now awake, family members at bedside. Assisting patient with dinner tray.    1900  Bedside and verbal report given to Christin Werner RN  
0710: Beside shift report given to Brando RN by Joslyn COFFMAN. Pt up in chair, NSR, reports pain 7/10 at incision. Pt on insulin gtt and cardene @ 10mg/hr.     0800: Shift assessment completed, see flow sheets.     0810:  CTS team at bedside. Plan for 5mg PO amlodipine today, wean cardene as tolerated, pull CVC and A line as appropriate, albumin now and another this afternoon, routinely give armen q4h to treat pain, NP will reassess CT output this afternoon.     0925: PT/OT at bedside to assess pt.     1028: Cardene weaned off.     1200: Reassessment completed, see flow sheets. Kailyn pulled, and SLIC removed and capped.     1400: Pt ambulated in alonso with rollator on 2L NC with X2 RN assist. Pt returned to bed. Sternal incision care completed.     1515: CT pulled by Mason NP. Requested by NP to call RT for breathing treatment as pt is dyspneic at rest and has audible wheezing, lungs very diminished to auscultate.     1545: Attempted PIV X2, failed. Plan to leave RIJ CVC until 2nd PIV obtained.     1600: Reassessment  completed, see flowsheets.     1715: Pt up to chair.     1742: CT drainage oozing onto gown. L CT incision oozing moderate amount. Dressing changed. Pt informed to notify nurse if drainage oozes onto gown again.           
1423: Pt arrived from OR. Report from BJ PA and anesthesia. Drips verified with anesthesia, Isac @ 1 mg/hr, precedex @ 0.6 mcg/kg/min, dorene pause, and insulin gtt.     1435: Dorene restarted @ 20 mcg/min    1448: 1st albumin given, CVP 4    1459: Precedex weaned off.     1514: 2nd albumin given, CVP 5    1532: Pt placed on spontaneous setting on ventilator. Following commands, reports little pain.     1812: Pt extubated to 3L nasal cannula. Pt tolerated well.             
1900: Bedside shift change report given to Tana RN (oncoming nurse) by Bertrand RN (offgoing nurse). Report included the following information Nurse Handoff Report, Intake/Output, MAR, Recent Results, and Cardiac Rhythm NSR .     Cardiac Surgery End of Shift Report PCU    Bedside shift change report given to Betty RN (oncoming nurse) by Tana Miles RN (offgoing nurse). Report included the following information Nurse Handoff Report, Intake/Output, MAR, Recent Results, and Cardiac Rhythm NSR .       Vitals:    BP BP Readings from Last 1 Encounters:   02/19/24 139/84      Temp Temp Readings from Last 1 Encounters:   02/19/24 98 °F (36.7 °C) (Oral)      Pulse Pulse Readings from Last 1 Encounters:   02/19/24 77      Resp Resp Readings from Last 1 Encounters:   02/19/24 18      SpO2 SpO2: 98 %    O2 Flow Rate (L/min): 0.5 L/min         EKG/Rhythm:   NSR                  External Pacemaker:  yes, V-wires              Pacer wires Capped?: YES    Oxygen therapy:   Oxygen Delivery:   room air    ISS Teaching Yes   Use : Yes  Volume: 750      Lines & Drains:  Central Venous Catheter:   CVC Double Lumen 02/14/24 (Active)   Central Line Being Utilized Yes 02/19/24 0549   Criteria for Appropriate Use Limited/no vessel suitable for conventional peripheral access 02/19/24 0549   Site Assessment Clean, dry & intact 02/19/24 0549   Phlebitis Assessment No symptoms 02/19/24 0549   Infiltration Assessment 0 02/19/24 0549   Color/Movement/Sensation Capillary refill less than 3 sec 02/19/24 0549   Proximal Lumen Color/Status White;Flushed;Alcohol cap applied 02/19/24 0549   Distal Lumen Color/Status Brown;Flushed;Alcohol cap applied 02/19/24 0549   Line Care Chlorhexidine wipes;Connections checked and tightened 02/19/24 0549   Alcohol Cap Used Yes 02/19/24 0549   Date of Last Dressing Change 02/19/24 02/19/24 0549   Dressing Type Transparent w/CHG gel 02/19/24 0549   Dressing Status New dressing applied 02/19/24 0549   Dressing 
1900: Bedside/verbal report received from Tiffany COFFMAN. NSR on CM. Visitors at bedside.  2019: 10 mg Hydralazine given for elevated BP.  2020: PRN Roxicodone given for incisional pain.  2152: Second dose of 10 mg Hydralazine given for elevated BP.  2300: Patient attempted to void in urinal (not knowing top was on) voided large amount in floor.  0422: 5 mg Roxicodone given with morning Tylenol dose.  0555: Central line dressing change done along with incisional/pacer wire care.  0650: OOB to standing scale and then to recliner chair.  0700: Bedside/verbal report given to Bertrand COFFMAN.  
1910: Bedside and Verbal shift change report given to MEGHNA Jorgensen (oncoming nurse) by MEGHNA Michaels (offgoing nurse). Report included the following information Nurse Handoff Report, Index, Adult Overview, Surgery Report, Intake/Output, MAR, Recent Results, Med Rec Status, Cardiac Rhythm NSR, and Neuro Assessment.    2000: Shift assessment complete. Pt is A+Ox4, afebrile, follows commands, rates mild 2/10 incisional pain. Lungs are clear/diminished bilaterally, on 3L NC. Pt is in NSR BP stable on 1mg Nicardipine, 40mcg Phenylephrine. Pulses palpable in all extrem, unable to assess LUE due to ace wrap. MSI and chest tube site CD&I, draining sanguinous fluid. Abd is soft, non-tender, BS active. Swallow screen passed. Pt voiding hazy emma urine via hayes. Skin dry and warm, see flow sheet for further details.    2209: critical CAROL result of 3.52, RADHA Barcenas notified, albumin infusing    2253 Rashad Richter was extubated on 2/14 at 1812 PM.  The patient was up to the chair for the first time on 2/14 at 2200 PM until NOW AM. The patient tolerated the activity well. Pt sitting on side of bed at 2253, then standing at bedside and placed in chair at 2200    0000: Reassessment complete. Pt up in chair, on 2L cc of mild incisional pain, no acute changes    0421: critical CAROL 1.86 resulted to RADHA Barcenas. UO currently meeting 30mL/hr goal but not meeting per kg/mL/hr goal. Pt up in chair unable to get accurate CVP. Pt requiring more Cardene, will hold off until day shift on fluid resuscitation    0513: EKG complete, pt in NSR w/occasional PVCs. Chest tube dressing has new moderate amount of draining. Entire chest cleansed with soap and water then chg. While cleansing tube site actively draining. Vaseline gauze applied w/ dry dressing on top. Pacer wires isolated and capped.     0700: Bedside and Verbal shift change report given to MEGHNA Michaels (oncoming nurse) by MEGHNA Jorgensen (offgoing nurse). Report included the 
[facility] FLO[facility] Progress Note    Admit Date: 2024  POD: 5 Days Post-Op      Procedure:  Procedure(s):  ON PUMP CABG CORONARY ARTERY BYPASS GRAFT x 2 WITH LEFT RADIAL ARTERY HARVEST, LEFT INTERNAL MAMMARY ARTERY HARVEST, ELIJAH AND EPI AORTIC BY DR STROUD (NO PAC)      Subjective/interval/overnight events:   Pt seen with Dr. Mooney, up in the chair. In NSR, on room air, afebrile. Vital signs stable. No events overnight. Pain and breathing are okay. Bowels are on the slow side but he doesn't want any more laxatives, and belly is soft. Started draining from sternotomy this AM.     Remains on the following infusions: none.  Objective:     /84   Pulse 77   Temp 98 °F (36.7 °C) (Oral)   Resp 18   Ht 1.854 m (6' 0.99\")   Wt (!) 136.5 kg (301 lb)   SpO2 98%   BMI 39.72 kg/m²   Temp (24hrs), Av.5 °F (36.9 °C), Min:98 °F (36.7 °C), Max:99.5 °F (37.5 °C)      Last 24hr Input/Output:    Intake/Output Summary (Last 24 hours) at 2024 0917  Last data filed at 2024 0515  Gross per 24 hour   Intake 1020 ml   Output 1550 ml   Net -530 ml        Chest Tube Output: N/A    EKG/Rhythm:   Encounter Date: 24   EKG 12 lead   Result Value    Ventricular Rate 94    Atrial Rate 94    P-R Interval 148    QRS Duration 106    Q-T Interval 348    QTc Calculation (Bazett) 435    P Axis 54    R Axis -16    T Axis -2    Diagnosis      Sinus rhythm with occasional premature ventricular complexes  Inferior infarct (cited on or before 2024)  Abnormal ECG  When compared with ECG of 2024 14:46,  No significant change was found         Oxygen: As above    CXR: Xray Result (most recent):  XR CHEST PORTABLE 2024    Narrative  EXAM:  XR CHEST PORTABLE    INDICATION: Postop heart    COMPARISON:     TECHNIQUE: portable chest AP view    FINDINGS: The cardiac silhouette is prominent as before. Elevation of the left    Impression  hemidiaphragm with mild interstitial prominence also unchanged. 
Cardiac Surgery Care Coordinator met with Rashad Richter. Reviewed plan of care and discussed goals for the day. Rashad Richter has a good understanding of his plan for the day.  Reinforced sternal precautions and encouraged continued use of the incentive spirometer.  Encouraged Rashad Richter to verbalize.   Will continue to follow for educational and emotional needs.  Mercedes Beaver RN    
Cardiac Surgery Care Coordinator- Met with Rashad Richter and his family.  Reviewed plan of care and discussed upcoming discharge date.  Reinforced sternal precautions and encouraged continued use of the incentive spirometer. Began discharge teaching and encouraged them to verbalize.  Reviewed goals for the day and discussed the  importance of increased activity and continued activity after discharge.  Provided dial soap and dynahex solution. Reviewed importance of wearing the red reminder bracelet and when to call MD. Will continue to follow for educational and emotional needs.  Mercedes Beaver RN    
Cardiac Surgery Coordinator met with the family of Rashad Richter, introduced role of the Cardiac Surgery Care Coordinator.  Reviewed plan of care and day of surgery expectations. Provided family with an update from OR.  Encouraged family to verbalize and emotional support given.  Will continue to update throughout the day.    1207 - Met with family of Rashad Richter, provided family with update of on by-pass.  Family without questions or concerns at this time.  Will continue to follow for educational and emotional needs.     1225 Met with family of Rashad Richter, provided family with update of rewarming.  Family without questions or concerns at this time.  Will continue to follow for educational and emotional needs.     
Cardiac Surgery ICU Progress Note    Admit Date: 2024  POD:  1 Day Post-Op    Procedure:  Procedure(s):  ON PUMP CABG CORONARY ARTERY BYPASS GRAFT x 2 WITH LEFT RADIAL ARTERY HARVEST, LEFT INTERNAL MAMMARY ARTERY HARVEST, ELIJAH AND EPI AORTIC BY DR STROUD (NO PAC)        Subjective/Interval events:   Patient seen with Dr. Tomlin. Pt up in the chair. Feeling ok. Increased c/o pain after CXR this am. Will give additional dilaudid dose now and give prn oxy more frequently.     On 2L NC. Tmax 100. On Cardene and Insulin gtts.      Objective:   Vitals:  Blood pressure 134/79, pulse 91, temperature 99.5 °F (37.5 °C), temperature source Bladder, resp. rate 30, height 1.854 m (6' 1\"), weight (!) 137.4 kg (302 lb 14.4 oz), SpO2 90 %.  Temp (24hrs), Av.2 °F (37.3 °C), Min:97.6 °F (36.4 °C), Max:100 °F (37.8 °C)    Oxygen Flow Rate: O2 Flow Rate (L/min): 2 L/min    Oxygen Delivery Method: O2 Device: Nasal cannula    Canadian-Melonie  Canadian-Melonie  CVP (Mean): 10 mmHg     EKG/Rhythm:  NSR 80-90s    Extubation Date / Time: 24 @ 1812    Feet to Floor: 24 @ 2200    CT Output: 650mL total ( 270mL last 12hrs)    CXR: Xray Result (most recent):  XR CHEST PORTABLE 02/15/2024    Narrative  EXAM:  XR CHEST PORTABLE    INDICATION: Postop heart    Comparison:     TECHNIQUE: portable chest AP view    FINDINGS: .    Enlarged cardiomediastinal silhouette and shallow inspiration. Interval removal  of ET tube. There is some mild gastric distention. Central line remains in  place. No pneumothorax or shift.    Impression  Interval removal of ET tube. Mild gastric distention. Shallow  inspiration with enlarged cardiomediastinal silhouette.      Admission Weight: Last Weight   Weight - Scale: 131.7 kg (290 lb 6.4 oz) Weight - Scale: (!) 137.4 kg (302 lb 14.4 oz)     Intake / Output / Drain:  Current Shift: 02/15 0701 - 02/15 1900  In: 973.4 [P.O.:516; I.V.:359]  Out: 125 [Urine:105; Drains:20]  Last 24 hrs.:   Intake/Output 
Cardiac Surgery ICU Progress Note    Admit Date: 2024  POD:  3 Days Post-Op    Procedure:  Procedure(s):  ON PUMP CABG CORONARY ARTERY BYPASS GRAFT x 2 WITH LEFT RADIAL ARTERY HARVEST, LEFT INTERNAL MAMMARY ARTERY HARVEST, ELIJAH AND EPI AORTIC BY DR STROUD (NO PAC)        Subjective/Interval events:   Patient seen with Dr. Tomlin. Afebrile, on 2L NC. Up in chair. No complaints. Off gtts.     Objective:   Vitals:  Blood pressure 115/64, pulse 76, temperature 98.4 °F (36.9 °C), temperature source Oral, resp. rate 18, height 1.854 m (6' 1\"), weight (!) 138.9 kg (306 lb 3.2 oz), SpO2 98 %.  Temp (24hrs), Av.7 °F (37.1 °C), Min:98 °F (36.7 °C), Max:99.7 °F (37.6 °C)    Oxygen Flow Rate: O2 Flow Rate (L/min): 2 L/min    Oxygen Delivery Method: O2 Device: Nasal cannula    Danevang-Melonie  Danevang-Melonie  CVP (Mean): 8 mmHg     EKG/Rhythm:  NSR 80-90s    Extubation Date / Time: 24 @ 1812    Feet to Floor: 24 @ 2200    CXR: Xray Result (most recent):  XR CHEST PORTABLE 2024    Narrative  Clinical history: Post op open heart surgery  INDICATION:   Post op open heart surgery  COMPARISON: 2024    FINDINGS:  AP portable upright view of the chest demonstrates a stable prominent  cardiopericardial silhouette.Right-sided venous axis sheath is stable.  Poststernotomy. Minimal pulmonary edema.Minimal left-sided effusion..There is no  pneumothorax.. Patient is on a cardiac monitor.    Impression  No significant interval change.      Admission Weight: Last Weight   Weight - Scale: 131.7 kg (290 lb 6.4 oz) Weight - Scale: (!) 138.9 kg (306 lb 3.2 oz)     Intake / Output / Drain:  Current Shift: No intake/output data recorded.  Last 24 hrs.:   Intake/Output Summary (Last 24 hours) at 2024 0811  Last data filed at 2024 0655  Gross per 24 hour   Intake 1129.89 ml   Output 1525 ml   Net -395.11 ml       EXAM:  General:  No acute distress             Lungs:   Clear upper, diminished bases to auscultation 
Cardiac Surgery ICU Progress Note    Admit Date: 2024  POD:  4 Days Post-Op    Procedure:  Procedure(s):  ON PUMP CABG CORONARY ARTERY BYPASS GRAFT x 2 WITH LEFT RADIAL ARTERY HARVEST, LEFT INTERNAL MAMMARY ARTERY HARVEST, ELIJAH AND EPI AORTIC BY DR STROUD (NO PAC)        Subjective/Interval events:   Patient seen with Dr. Robles. Afebrile, on RA Up in chair. No complaints. Off gtts. Nursing reports wheezing overnight.      Objective:   Vitals:  Blood pressure (!) 142/74, pulse 93, temperature 98.1 °F (36.7 °C), temperature source Oral, resp. rate 18, height 1.854 m (6' 1\"), weight (!) 137.1 kg (302 lb 4 oz), SpO2 95 %.  Temp (24hrs), Av.3 °F (36.8 °C), Min:97.9 °F (36.6 °C), Max:98.7 °F (37.1 °C)    Oxygen Flow Rate: O2 Flow Rate (L/min): 0.5 L/min    Oxygen Delivery Method: O2 Device: None (Room air)    Richmond-Melonie  Richmond-Melonie  CVP (Mean): 8 mmHg     EKG/Rhythm:  NSR 80-90s    Extubation Date / Time: 24 @ 1812    Feet to Floor: 24 @ 2200    CXR: Xray Result (most recent):  XR CHEST PORTABLE 2024    Narrative  Clinical history: Post op open heart surgery  INDICATION:   Post op open heart surgery  COMPARISON: 2024    FINDINGS:  AP portable upright view of the chest demonstrates a stable prominent  cardiopericardial silhouette.Right-sided venous axis sheath is stable.  Poststernotomy. Minimal pulmonary edema.Minimal left-sided effusion..There is no  pneumothorax.. Patient is on a cardiac monitor.    Impression  No significant interval change.      Admission Weight: Last Weight   Weight - Scale: 131.7 kg (290 lb 6.4 oz) Weight - Scale: (!) 137.1 kg (302 lb 4 oz)     Intake / Output / Drain:  Current Shift: No intake/output data recorded.  Last 24 hrs.:   Intake/Output Summary (Last 24 hours) at 2024 0814  Last data filed at 2024 0700  Gross per 24 hour   Intake 858.14 ml   Output 2250 ml   Net -1391.86 ml       EXAM:  General:  No acute distress             Lungs:   Clear upper, 
Spiritual Care Assessment/Progress Note  [facility]    Name: Rashad Richter MRN: [id number]    Age: 63 y.o.     Sex: male   Language: English     Date: 2/15/2024            Total Time Calculated: 16 min              Spiritual Assessment begun in [facility]  Service Provided For:: Patient not available  Referral/Consult From:: Multi-disciplinary team  Encounter Overview/Reason : Advance Care Planning    Spiritual beliefs:      [] Involved in a [place] tradition/spiritual practice:      [] Supported by a [place] community:      [] Claims no spiritual orientation:      [] Seeking spiritual identity:           [] Adheres to an individual form of spirituality:      [x] Not able to assess:                Identified resources for coping and support system:   Support System: Unknown       [] Prayer                  [] Devotional reading               [] Music                  [] Guided Imagery     [] Pet visits                                        [] Other: (COMMENT)     Specific area/focus of visit   Encounter:    Crisis:    Spiritual/Emotional needs:    Ritual, Rites and Sacraments:    Grief, Loss, and Adjustments:    Ethics/Mediation:    Behavioral Health:    Palliative Care:    Advance Care Planning:      Plan/Referrals: Continue to visit, (comment)    Narrative:     ACP Assessment:   Received request from IDT Member.  Upon review of chart and communication with care team, patient's decision making abilities are not in question.. Patient was/were present in the room during visit, but he was sleeping.  Left a copy of the advance medical directive for his review, booklet \"Your Right to Decide\" and a note informing patient of  availability to discuss AMD further and/or provide assistance in completing document.  available upon referral by staff or by patient/family request.        Unable to assess for spiritual needs or concerns at this time.    Carol Billy MPS, BCC, Staff 
Summary  Patient slept well, denies pain, expiratory Wheezy  > Neb given twice overnight, cough some clear secretion, oxygen titrated off, walked in the hallway (210 feet) without any complication,   Bedside and Verbal shift change report given to MEGHNA Thurston (oncoming nurse) by MEGHNA Epperson (offgoing nurse). Report included the following information SBAR, Kardex, Intake/Output, MAR, Accordion, Recent Results, Med Rec Status and Cardiac Rhythm SR.    
statin  Thrombocytopenia: Plt low chronically, up to 124. Plt 87 this am. Cont ASA for now.  Carotid stenosis: Occluded right internal carotid artery, mild Left. Has seen vascular surgery.  GERD: On protonix  Constipation: On linzess  Hyperthyroidism: TSH 0.31, will need to check T3,T4. TSH down to 0.29, T3 low, T4 normal. FU with PCP/endocrine.    TIA: Pt reports hx of TIA, he also reports occasional numbness on the entire right side of his body that comes on \"randomly\" and lasts for a few minutes.   Obesity: BMI 40.  Weight loss counseling when appropriate    Pain regimen: scheduled tylenol, lidocaine patches, prn oxy and dilaudid  Fluid and electrolytes: Maintain K+ >4 and Mg level >2.  Nutrition: advance diet as tolerated, cardiac, add supplement   Activity: OOB all meals and ambulate in halls, encourage I/S   Bowel Regimen: Senokot , Miralax, and prn dulcolax   GI ppx: Pepcid  DVT ppx: SCDs   Dispo: Transition to stepdown status. Case management following to aid in d/c planning. Possibly home in the next 2-3 days.     Signed By: TOM Allen - RADHA

## 2024-02-19 NOTE — CARE COORDINATION
Transition of Care Plan:     RUR: 17%  Prior Level of Functioning: Independent  Disposition: Home [facility] has accepted     If SNF or IPR: Date FOC offered: 2/16/24  Date FOC received: 2/16/24  Accepting facility:   Date authorization started with reference number:   Date authorization received and expires:   Follow up appointments: Specialist, PCP  DME needed: pending clinical progress  Transportation at discharge: Family to provide transportation   IM/IMM Medicare/ letter given: 2nd IM Letter given   Is patient a [place] and connected with [facility]? N/A              If yes, was [place] transfer form completed and [facility] notified?   Caregiver Contact: Wife- Apple Richter - [phone]  Discharge Caregiver contacted prior to discharge?   CM discussed d/c plan with pt.  Care Conference needed? none  Barriers to discharge: Cardio thoracic clearance    Pt cleared for d/c from CM standpoint.    CM updated  on d/c date for SOC.       02/19/24 1044   Services At/After Discharge   Transition of Care Consult (CM Consult) N/A   Services At/After Discharge None   Mode of Transport at Discharge Other (see comment)  (Family)   Confirm Follow Up Transport Family   Condition of Participation: Discharge Planning   The Plan for Transition of Care is related to the following treatment goals: Return to baseline   The Patient and/or Patient Representative was provided with a Choice of Provider? Patient   The Patient and/Or Patient Representative agree with the Discharge Plan? Yes   Freedom of Choice list was provided with basic dialogue that supports the patient's individualized plan of care/goals, treatment preferences, and shares the quality data associated with the providers?  Yes       WES Crook,RN  Care   [facility]  Phone: [phone]

## 2024-02-20 ENCOUNTER — TELEPHONE (OUTPATIENT)
Facility: HOSPITAL | Age: 64
End: 2024-02-20

## 2024-02-20 NOTE — TELEPHONE ENCOUNTER
Cardiac Surgery Discharge - Follow up call placed to Rashad Richter. Spoke to his wife because the home health nurse was there. Reviewed plan of care after discharge and encouraged wife to verbalize.  Discussed precautions. Home health nurse at the home now and reviewed medications. Patient without questions regarding medications. Encouraged continued use of the incentive spirometer, daily weights and temp. Pt is cleaning incision and is walking. He has not showered yet, but plans on doing that this evening.  Confirmed follow up appts and reinforced importance of wearing red reminder bracelet. Wife is without questions or concerns. Mercedes Beaver RN

## 2024-02-23 ENCOUNTER — HOSPITAL ENCOUNTER (OUTPATIENT)
Facility: HOSPITAL | Age: 64
Discharge: HOME | End: 2024-02-23
Payer: COMMERCIAL

## 2024-02-23 ENCOUNTER — OFFICE VISIT (OUTPATIENT)
Age: 64
End: 2024-02-23

## 2024-02-23 VITALS
TEMPERATURE: 98.2 F | SYSTOLIC BLOOD PRESSURE: 106 MMHG | HEART RATE: 65 BPM | DIASTOLIC BLOOD PRESSURE: 76 MMHG | RESPIRATION RATE: 17 BRPM | BODY MASS INDEX: 39.32 KG/M2 | OXYGEN SATURATION: 95 % | WEIGHT: 298 LBS

## 2024-02-23 DIAGNOSIS — Z95.1 S/P CABG X 2: Primary | ICD-10-CM

## 2024-02-23 DIAGNOSIS — M79.89 LEFT UPPER EXTREMITY SWELLING: ICD-10-CM

## 2024-02-23 PROCEDURE — 93971 EXTREMITY STUDY: CPT

## 2024-02-23 PROCEDURE — 99024 POSTOP FOLLOW-UP VISIT: CPT

## 2024-02-23 ASSESSMENT — PATIENT HEALTH QUESTIONNAIRE - PHQ9
SUM OF ALL RESPONSES TO PHQ QUESTIONS 1-9: 1
SUM OF ALL RESPONSES TO PHQ QUESTIONS 1-9: 1
2. FEELING DOWN, DEPRESSED OR HOPELESS: 1
1. LITTLE INTEREST OR PLEASURE IN DOING THINGS: 0
SUM OF ALL RESPONSES TO PHQ QUESTIONS 1-9: 1
SUM OF ALL RESPONSES TO PHQ9 QUESTIONS 1 & 2: 1
SUM OF ALL RESPONSES TO PHQ QUESTIONS 1-9: 1

## 2024-02-23 NOTE — PROGRESS NOTES
Rashad Richter reported today for the following:    Chief Complaint   Patient presents with    Coronary Artery Disease    Post-Op Check     1 week post CABG follow up       Reviewed record in preparation for visit and have obtained necessary documentation. Identified pt with two pt identifiers (name and ).     1. Have you been to the ER, urgent care clinic since your last visit?  Hospitalized since your last visit?No    2. Have you seen or consulted any other health care providers outside of the [facility] since your last visit?  Include any pap smears or colon screening. No    /76   Pulse 65   Temp 98.2 °F (36.8 °C) (Oral)   Resp 17   Wt 135.2 kg (298 lb)   SpO2 95%   BMI 39.32 kg/m²     Medications reviewed/approved by provider.    No results found for this visit on 24.         Nadia Torres RN

## 2024-02-23 NOTE — PROGRESS NOTES
Patient: Rashad Richter   Age: 64 y.o.     Patient Care Team:  Maciel Laguna MD as PCP - General  Maciel Laguna MD as PCP - [facility] Provider    Diagnosis: The primary encounter diagnosis was S/P CABG x 2. A diagnosis of Left upper extremity swelling was also pertinent to this visit.    Problem List:   Patient Active Problem List   Diagnosis    Constipation    Enthesopathy of knee    Edema    Tinea barbae    GERD (gastroesophageal reflux disease)    Hypercholesterolemia    Essential hypertension    Coronary arteriosclerosis in native artery    Vitamin D deficiency    Disorder of esophagus    Atypical chest pain    Contact dermatitis    Obesity    Hypertensive heart disease    Chest pain    Irritable bowel syndrome with constipation    Mixed hyperlipidemia    Acute CHF (HCC)    Severe obesity (BMI 35.0-39.9) with comorbidity (HCC)    Hypertensive heart disease with congestive heart failure (HCC)    History of carotid stenosis    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    Right sided numbness    Peripheral vascular disease (HCC)    Chronic systolic (congestive) heart failure    Left hip pain    Pruritic dermatitis    Facial skin lesion    Dizziness and giddiness    History of CVA (cerebrovascular accident)    Refused influenza vaccine    Acute coronary syndrome (HCC)    Bilateral carotid artery stenosis    Preop cardiovascular exam    Disease of cardiovascular system    CAD in native artery    Diabetes mellitus, new onset (HCC)    S/P CABG x 2    Type 2 diabetes mellitus with hyperglycemia, without long-term current use of insulin ([facility])          Date of Surgery: CABG x 2      Surgery: 2/14/24     HPI:  Pt had a CABG x 2 on 2/14/24 . Post-op course uncomplicated and patient discharged home with HH on POD5.     2/23/24: Pt is here for one week post op follow up. Overall doing well. Pain is improving. Endorses SOB (Exertional), orthopnea (tried to lay down with PT the other day and became SOB), LE edema

## 2024-02-27 RX ORDER — FOLIC ACID 1 MG/1
1 TABLET ORAL DAILY
Qty: 30 TABLET | Refills: 0 | Status: SHIPPED | OUTPATIENT
Start: 2024-02-27

## 2024-02-29 RX ORDER — LINACLOTIDE 290 UG/1
CAPSULE, GELATIN COATED ORAL
Qty: 30 CAPSULE | Refills: 0 | Status: SHIPPED | OUTPATIENT
Start: 2024-02-29

## 2024-03-02 PROBLEM — Z01.810 PREOP CARDIOVASCULAR EXAM: Status: RESOLVED | Noted: 2024-02-01 | Resolved: 2024-03-02

## 2024-03-13 RX ORDER — PANTOPRAZOLE SODIUM 40 MG/1
TABLET, DELAYED RELEASE ORAL
Qty: 90 TABLET | Refills: 0 | OUTPATIENT
Start: 2024-03-13

## 2024-03-27 ENCOUNTER — OFFICE VISIT (OUTPATIENT)
Age: 64
End: 2024-03-27

## 2024-03-27 VITALS
HEART RATE: 70 BPM | SYSTOLIC BLOOD PRESSURE: 138 MMHG | OXYGEN SATURATION: 97 % | RESPIRATION RATE: 14 BRPM | DIASTOLIC BLOOD PRESSURE: 82 MMHG

## 2024-03-27 DIAGNOSIS — Z95.1 S/P CABG X 2: Primary | ICD-10-CM

## 2024-03-27 PROCEDURE — 99024 POSTOP FOLLOW-UP VISIT: CPT | Performed by: THORACIC SURGERY (CARDIOTHORACIC VASCULAR SURGERY)

## 2024-03-27 RX ORDER — FUROSEMIDE 40 MG/1
40 TABLET ORAL DAILY
Qty: 5 TABLET | Refills: 0 | Status: SHIPPED | OUTPATIENT
Start: 2024-03-27 | End: 2024-04-01

## 2024-03-27 RX ORDER — FUROSEMIDE 40 MG/1
40 TABLET ORAL PRN
COMMUNITY

## 2024-03-27 NOTE — PROGRESS NOTES
Patient: Rashad Richter   Age: 64 y.o.     Patient Care Team:  Maciel Laguna MD as PCP - General  [facility]    Diagnosis: The encounter diagnosis was S/P CABG x 2.    Problem List:   Patient Active Problem List   Diagnosis    Constipation    Enthesopathy of knee    Edema    Tinea barbae    GERD (gastroesophageal reflux disease)    Hypercholesterolemia    Essential hypertension    Coronary arteriosclerosis in native artery    Vitamin D deficiency    Disorder of esophagus    Atypical chest pain    Contact dermatitis    Obesity    Hypertensive heart disease    Chest pain    Irritable bowel syndrome with constipation    Mixed hyperlipidemia    Acute CHF (HCC)    Severe obesity (BMI 35.0-39.9) with comorbidity (HCC)    Hypertensive heart disease with congestive heart failure (HCC)    History of carotid stenosis    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    Right sided numbness    Peripheral vascular disease (HCC)    Chronic systolic (congestive) heart failure    Left hip pain    Pruritic dermatitis    Facial skin lesion    Dizziness and giddiness    History of CVA (cerebrovascular accident)    Refused influenza vaccine    Acute coronary syndrome (HCC)    Bilateral carotid artery stenosis    Disease of cardiovascular system    CAD in native artery    Diabetes mellitus, new onset (HCC)    S/P CABG x 2    Type 2 diabetes mellitus with hyperglycemia, without long-term current use of insulin ([facility])      Date of Surgery: 2/14/24    Surgery: ON PUMP CABG CORONARY ARTERY BYPASS GRAFT x 2 WITH LEFT RADIAL ARTERY HARVEST, LEFT INTERNAL MAMMARY ARTERY HARVEST     HPI:  Pt is here for post op follow up. He is ambulating well without assistance. He has SOB with exertion that he says feels like he has vocal cord polyps again. He has called ENT and has an appointment scheduled. He does have LE edema that is worse when he is on his feet all day. He has a good appetite. He sneezed a few days

## 2024-04-01 ENCOUNTER — HOSPITAL ENCOUNTER (OUTPATIENT)
Facility: HOSPITAL | Age: 64
Discharge: HOME | End: 2024-04-04
Payer: COMMERCIAL

## 2024-04-01 DIAGNOSIS — Z95.1 S/P CABG X 2: ICD-10-CM

## 2024-04-01 PROCEDURE — 71046 X-RAY EXAM CHEST 2 VIEWS: CPT

## 2024-04-02 ENCOUNTER — TELEPHONE (OUTPATIENT)
Age: 64
End: 2024-04-02

## 2024-04-02 NOTE — TELEPHONE ENCOUNTER
Called patient to update him on CXR results. Pt reports no change in his SOB since resuming lasix. Pt states it feels like the issue he has had before with vocal cord polyps. He has an ENT appointment scheduled.

## 2024-04-23 RX ORDER — ATORVASTATIN CALCIUM 80 MG/1
TABLET, FILM COATED ORAL
Qty: 90 TABLET | Refills: 0 | Status: SHIPPED | OUTPATIENT
Start: 2024-04-23

## 2024-04-27 ENCOUNTER — APPOINTMENT (OUTPATIENT)
Facility: HOSPITAL | Age: 64
End: 2024-04-27
Payer: COMMERCIAL

## 2024-04-27 ENCOUNTER — HOSPITAL ENCOUNTER (EMERGENCY)
Facility: HOSPITAL | Age: 64
Discharge: HOME | End: 2024-04-27
Attending: EMERGENCY MEDICINE
Payer: COMMERCIAL

## 2024-04-27 VITALS
SYSTOLIC BLOOD PRESSURE: 174 MMHG | HEIGHT: 73 IN | RESPIRATION RATE: 18 BRPM | HEART RATE: 82 BPM | BODY MASS INDEX: 39.23 KG/M2 | OXYGEN SATURATION: 97 % | TEMPERATURE: 98.3 F | DIASTOLIC BLOOD PRESSURE: 99 MMHG | WEIGHT: 296 LBS

## 2024-04-27 DIAGNOSIS — T18.128A FOOD IMPACTION OF ESOPHAGUS, INITIAL ENCOUNTER: Primary | ICD-10-CM
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
## 2024-04-27 DIAGNOSIS — S90.01XA CONTUSION OF RIGHT ANKLE, INITIAL ENCOUNTER: ICD-10-CM

## 2024-04-27 DIAGNOSIS — R60.0 PERIPHERAL EDEMA: ICD-10-CM

## 2024-04-27 PROCEDURE — 71045 X-RAY EXAM CHEST 1 VIEW: CPT

## 2024-04-27 PROCEDURE — 99283 EMERGENCY DEPT VISIT LOW MDM: CPT

## 2024-04-27 PROCEDURE — 73610 X-RAY EXAM OF ANKLE: CPT

## 2024-04-27 ASSESSMENT — LIFESTYLE VARIABLES
HOW OFTEN DO YOU HAVE A DRINK CONTAINING ALCOHOL: NEVER
HOW MANY STANDARD DRINKS CONTAINING ALCOHOL DO YOU HAVE ON A TYPICAL DAY: PATIENT DOES NOT DRINK

## 2024-04-27 ASSESSMENT — PAIN - FUNCTIONAL ASSESSMENT: PAIN_FUNCTIONAL_ASSESSMENT: NONE - DENIES PAIN

## 2024-04-28 ENCOUNTER — HOSPITAL ENCOUNTER (OUTPATIENT)
Facility: HOSPITAL | Age: 64
Discharge: HOME | End: 2024-04-30
Payer: COMMERCIAL

## 2024-04-28 ENCOUNTER — TRANSCRIBE ORDERS (OUTPATIENT)
Facility: HOSPITAL | Age: 64
End: 2024-04-28

## 2024-04-28 DIAGNOSIS — R60.9 EDEMA, UNSPECIFIED TYPE: ICD-10-CM

## 2024-04-28 DIAGNOSIS — R60.9 SWELLING: Primary | ICD-10-CM

## 2024-04-28 DIAGNOSIS — R60.9 SWELLING: ICD-10-CM

## 2024-04-28 PROCEDURE — 93970 EXTREMITY STUDY: CPT

## 2024-04-28 NOTE — ED PROVIDER NOTES
acid (FOLVITE) 1 MG tablet Take 1 tablet by mouth daily 30 tablet 0    amiodarone (CORDARONE) 200 MG tablet Take two tablets twice daily x 2 weeks, then take two tablets once daily x 2 weeks, then STOP 84 tablet 0    acetaminophen (TYLENOL) 325 MG tablet Take 3 tablets by mouth every 6 hours as needed for Pain or Fever      metFORMIN (GLUCOPHAGE-XR) 500 MG extended release tablet Take 1 tablet by mouth daily (with breakfast) 90 tablet 0    Dulaglutide (TRULICITY) 0.75 MG/0.5ML SOPN Inject 0.75 mg into the skin once a week (Patient not taking: Reported on 3/27/2024) 1 Adjustable Dose Pre-filled Pen Syringe 1    Lancets MISC 1 each by Does not apply route daily 100 each 3    blood glucose monitor strips Test glucose two times daily: Before eating and drinking anything in the morning and before dinner. 100 strip 0    blood glucose monitor kit and supplies Check blood sugar twice daily, before breakfast and dinner 1 kit 0    amLODIPine (NORVASC) 5 MG tablet Take 1 tablet by mouth daily      hydrOXYzine HCl (ATARAX) 10 MG tablet Take 1 tablet by mouth 2 times daily as needed for Itching      aspirin 81 MG EC tablet Take 1 tablet by mouth daily      carvedilol (COREG) 25 MG tablet Take 1 tablet by mouth 2 times daily      cyanocobalamin 1000 MCG tablet Take 1 tablet by mouth daily      lubiprostone (AMITIZA) 24 MCG capsule Take 1 capsule by mouth 2 times daily (with meals)         Social Determinants of Health:   Social Determinants of Health     Tobacco Use: Medium Risk (4/27/2024)    Patient History     Smoking Tobacco Use: Former     Smokeless Tobacco Use: Never     Passive Exposure: Not on file   Alcohol Use: Not At Risk (4/27/2024)    AUDIT-C     Frequency of Alcohol Consumption: Never     Average Number of Drinks: Patient does not drink     Frequency of Binge Drinking: Never   Financial Resource Strain: Low Risk  (12/13/2023)    Overall Financial Resource Strain (CARDIA)     Difficulty of Paying Living Expenses: Not

## 2024-07-08 ENCOUNTER — OFFICE VISIT (OUTPATIENT)
Facility: CLINIC | Age: 64
End: 2024-07-08
Payer: MEDICARE

## 2024-07-08 VITALS
RESPIRATION RATE: 20 BRPM | HEART RATE: 86 BPM | HEIGHT: 73 IN | DIASTOLIC BLOOD PRESSURE: 88 MMHG | OXYGEN SATURATION: 97 % | SYSTOLIC BLOOD PRESSURE: 147 MMHG | BODY MASS INDEX: 28.89 KG/M2 | TEMPERATURE: 97.9 F | WEIGHT: 218 LBS

## 2024-07-08 DIAGNOSIS — I65.23 BILATERAL CAROTID ARTERY STENOSIS: ICD-10-CM

## 2024-07-08 DIAGNOSIS — I11.0 HYPERTENSIVE HEART DISEASE WITH CONGESTIVE HEART FAILURE, UNSPECIFIED HEART FAILURE TYPE (HCC): ICD-10-CM

## 2024-07-08 DIAGNOSIS — K58.1 IRRITABLE BOWEL SYNDROME WITH CONSTIPATION: ICD-10-CM

## 2024-07-08 DIAGNOSIS — E66.01 SEVERE OBESITY (BMI 35.0-39.9) WITH COMORBIDITY (HCC): Primary | ICD-10-CM

## 2024-07-08 DIAGNOSIS — I25.10 DISEASE OF CARDIOVASCULAR SYSTEM: ICD-10-CM

## 2024-07-08 DIAGNOSIS — I50.22 CHRONIC SYSTOLIC (CONGESTIVE) HEART FAILURE (HCC): ICD-10-CM

## 2024-07-08 DIAGNOSIS — I51.7 CARDIOMEGALY: ICD-10-CM

## 2024-07-08 DIAGNOSIS — R60.0 LOWER EXTREMITY EDEMA: ICD-10-CM

## 2024-07-08 DIAGNOSIS — K21.00 GASTROESOPHAGEAL REFLUX DISEASE WITH ESOPHAGITIS WITHOUT HEMORRHAGE: ICD-10-CM

## 2024-07-08 LAB
GLUCOSE, POC: 122 MG/DL
HBA1C MFR BLD: 6.8 %

## 2024-07-08 PROCEDURE — 99214 OFFICE O/P EST MOD 30 MIN: CPT | Performed by: FAMILY MEDICINE

## 2024-07-08 PROCEDURE — 1123F ACP DISCUSS/DSCN MKR DOCD: CPT | Performed by: FAMILY MEDICINE

## 2024-07-08 PROCEDURE — 1036F TOBACCO NON-USER: CPT | Performed by: FAMILY MEDICINE

## 2024-07-08 PROCEDURE — 82962 GLUCOSE BLOOD TEST: CPT | Performed by: FAMILY MEDICINE

## 2024-07-08 PROCEDURE — G8417 CALC BMI ABV UP PARAM F/U: HCPCS | Performed by: FAMILY MEDICINE

## 2024-07-08 PROCEDURE — G8427 DOCREV CUR MEDS BY ELIG CLIN: HCPCS | Performed by: FAMILY MEDICINE

## 2024-07-08 PROCEDURE — 3077F SYST BP >= 140 MM HG: CPT | Performed by: FAMILY MEDICINE

## 2024-07-08 PROCEDURE — 3017F COLORECTAL CA SCREEN DOC REV: CPT | Performed by: FAMILY MEDICINE

## 2024-07-08 PROCEDURE — 83036 HEMOGLOBIN GLYCOSYLATED A1C: CPT | Performed by: FAMILY MEDICINE

## 2024-07-08 PROCEDURE — 3079F DIAST BP 80-89 MM HG: CPT | Performed by: FAMILY MEDICINE

## 2024-07-08 RX ORDER — EZETIMIBE 10 MG/1
10 TABLET ORAL DAILY
Qty: 90 TABLET | Refills: 0 | Status: SHIPPED | OUTPATIENT
Start: 2024-07-08

## 2024-07-08 RX ORDER — SACUBITRIL AND VALSARTAN 24; 26 MG/1; MG/1
TABLET, FILM COATED ORAL
COMMUNITY
Start: 2024-04-03

## 2024-07-08 RX ORDER — HYDROXYZINE HYDROCHLORIDE 10 MG/1
TABLET, FILM COATED ORAL
COMMUNITY

## 2024-07-08 RX ORDER — ASPIRIN 81 MG/1
81 TABLET ORAL DAILY
Qty: 90 TABLET | Refills: 0 | Status: SHIPPED | OUTPATIENT
Start: 2024-07-08

## 2024-07-08 RX ORDER — CLOPIDOGREL BISULFATE 75 MG/1
TABLET ORAL
COMMUNITY
Start: 2024-04-22 | End: 2024-07-09

## 2024-07-08 RX ORDER — AMLODIPINE BESYLATE 5 MG/1
5 TABLET ORAL DAILY
Qty: 90 TABLET | Refills: 0 | Status: SHIPPED | OUTPATIENT
Start: 2024-07-08

## 2024-07-08 RX ORDER — EZETIMIBE 10 MG/1
10 TABLET ORAL DAILY
COMMUNITY
End: 2024-07-08 | Stop reason: SDUPTHER

## 2024-07-08 RX ORDER — NITROGLYCERIN 0.4 MG/1
TABLET SUBLINGUAL
COMMUNITY

## 2024-07-08 RX ORDER — HYDROXYZINE HYDROCHLORIDE 10 MG/1
10 TABLET, FILM COATED ORAL 2 TIMES DAILY PRN
Qty: 60 TABLET | Refills: 0 | Status: CANCELLED | OUTPATIENT
Start: 2024-07-08

## 2024-07-08 RX ORDER — LUBIPROSTONE 24 UG/1
24 CAPSULE ORAL 2 TIMES DAILY WITH MEALS
Qty: 60 CAPSULE | Refills: 1 | Status: SHIPPED | OUTPATIENT
Start: 2024-07-08

## 2024-07-08 RX ORDER — FUROSEMIDE 40 MG/1
40 TABLET ORAL DAILY
Qty: 60 TABLET | Refills: 1 | Status: SHIPPED | OUTPATIENT
Start: 2024-07-08

## 2024-07-08 ASSESSMENT — PATIENT HEALTH QUESTIONNAIRE - PHQ9
SUM OF ALL RESPONSES TO PHQ QUESTIONS 1-9: 0
SUM OF ALL RESPONSES TO PHQ QUESTIONS 1-9: 0
1. LITTLE INTEREST OR PLEASURE IN DOING THINGS: NOT AT ALL
2. FEELING DOWN, DEPRESSED OR HOPELESS: NOT AT ALL
SUM OF ALL RESPONSES TO PHQ QUESTIONS 1-9: 0
SUM OF ALL RESPONSES TO PHQ9 QUESTIONS 1 & 2: 0
SUM OF ALL RESPONSES TO PHQ QUESTIONS 1-9: 0

## 2024-07-08 NOTE — PROGRESS NOTES
Chief Complaint   Patient presents with    sick visit     Pt reports having SOB when walking x 1 week, wheezing when walking     BP (!) 147/88 (Site: Left Upper Arm, Position: Sitting, Cuff Size: Large Adult)   Pulse 86   Temp 97.9 °F (36.6 °C) (Oral)   Resp 20   Ht 1.854 m (6' 1\")   Wt 98.9 kg (218 lb)   SpO2 97%   BMI 28.76 kg/m²       \"Have you been to the ER, urgent care clinic since your last visit?  Hospitalized since your last visit?\"    NO    “Have you seen or consulted any other health care providers outside of [facility] since your last visit?”    NO            Click Here for Release of Records Request  
sounds.   Abdominal:      General: Abdomen is flat. Bowel sounds are normal.      Palpations: Abdomen is soft.   Musculoskeletal:         General: Normal range of motion.      Cervical back: Normal range of motion and neck supple.   Skin:     General: Skin is warm and dry.      Capillary Refill: Capillary refill takes less than 2 seconds.   Neurological:      General: No focal deficit present.      Mental Status: He is alert and oriented to person, place, and time. Mental status is at baseline.   Psychiatric:         Mood and Affect: Mood normal.         Behavior: Behavior normal.         Thought Content: Thought content normal.         Judgment: Judgment normal.              Results for orders placed or performed in visit on 07/08/24   AMB POC GLUCOSE BLOOD, BY GLUCOSE MONITORING DEVICE   Result Value Ref Range    Glucose,  MG/DL       Assessment/Plan:    ICD-10-CM    1. Severe obesity (BMI 35.0-39.9) with comorbidity ([facility])  E66.01 AMB POC GLUCOSE BLOOD, BY GLUCOSE MONITORING DEVICE      2. Hypertensive heart disease with congestive heart failure, unspecified heart failure type ([facility])  I11.0       3. Chronic systolic (congestive) heart failure  I50.22       4. Disease of cardiovascular system  I25.10       5. Bilateral carotid artery stenosis  I65.23       6. Gastroesophageal reflux disease with esophagitis without hemorrhage  K21.00       7. Cardiomegaly  I51.7     restart lasix      8. Lower extremity edema  R60.0       9. Irritable bowel syndrome with constipation  K58.1       DM and HTN:Well controlled and at goal    Orders Placed This Encounter   Procedures    AMB POC GLUCOSE BLOOD, BY GLUCOSE MONITORING DEVICE     Cannot display discharge medications since this is not an admission.

## 2024-07-09 ENCOUNTER — TELEPHONE (OUTPATIENT)
Facility: CLINIC | Age: 64
End: 2024-07-09

## 2024-07-09 RX ORDER — ATORVASTATIN CALCIUM 80 MG/1
80 TABLET, FILM COATED ORAL DAILY
Qty: 90 TABLET | Refills: 0 | Status: SHIPPED | OUTPATIENT
Start: 2024-07-09

## 2024-07-09 RX ORDER — CLOPIDOGREL BISULFATE 75 MG/1
75 TABLET ORAL DAILY
Qty: 90 TABLET | Refills: 0 | Status: SHIPPED | OUTPATIENT
Start: 2024-07-09

## 2024-07-09 RX ORDER — CARVEDILOL 6.25 MG/1
6.25 TABLET ORAL 2 TIMES DAILY WITH MEALS
Qty: 180 TABLET | Refills: 0 | Status: SHIPPED | OUTPATIENT
Start: 2024-07-09 | End: 2024-10-07

## 2024-07-09 RX ORDER — HYDROXYZINE HYDROCHLORIDE 10 MG/1
10 TABLET, FILM COATED ORAL DAILY
Qty: 30 TABLET | Refills: 1 | Status: SHIPPED | OUTPATIENT
Start: 2024-07-09

## 2024-07-09 RX ORDER — FOLIC ACID 1 MG/1
1000 TABLET ORAL DAILY
Qty: 30 TABLET | Refills: 0 | Status: SHIPPED | OUTPATIENT
Start: 2024-07-09

## 2024-07-09 RX ORDER — LINACLOTIDE 290 UG/1
CAPSULE, GELATIN COATED ORAL
Qty: 90 CAPSULE | Refills: 0 | Status: SHIPPED | OUTPATIENT
Start: 2024-07-09

## 2024-07-09 RX ORDER — CHLORTHALIDONE 25 MG/1
25 TABLET ORAL DAILY
Qty: 90 TABLET | Refills: 0 | Status: SHIPPED | OUTPATIENT
Start: 2024-07-09

## 2024-07-09 RX ORDER — ATORVASTATIN CALCIUM 80 MG/1
TABLET, FILM COATED ORAL
Qty: 90 TABLET | Refills: 0 | Status: SHIPPED | OUTPATIENT
Start: 2024-07-09 | End: 2024-07-09

## 2024-07-09 NOTE — TELEPHONE ENCOUNTER
Patient said he told you about a medication during his visit yesterday but could not remember the name and would call back      LUBIPROSTONE 24MG TAKES 2 X DAILY

## 2024-07-22 NOTE — TELEPHONE ENCOUNTER
Call received from the [facility] Dept stating a 911 came came from pts. Address, pt was c/o chest pain and SOB, medics were called to the scene, pt collapsed, medics worked continued to work on pt, pt coded and was pronounced dead at 0645 AM, pt body will be released to [facility] in [place], [place]

## 2024-10-04 RX ORDER — ATORVASTATIN CALCIUM 80 MG/1
TABLET, FILM COATED ORAL
Qty: 90 TABLET | Refills: 0 | OUTPATIENT
Start: 2024-10-04

## (undated) DEVICE — DRAPE SLUSH DISC W44XL66IN ST FOR RND BSIN HUSH SLUSH SYS

## (undated) DEVICE — DUAL LUMEN STOMACH TUBE MULTI-FUNCTIONAL PORT: Brand: SALEM SUMP

## (undated) DEVICE — PERCLOSE PROGLIDE™ SUTURE-MEDIATED CLOSURE SYSTEM: Brand: PERCLOSE PROGLIDE™

## (undated) DEVICE — PINNACLE INTRODUCER SHEATH: Brand: PINNACLE

## (undated) DEVICE — SUTURE STRATAFIX SPRL PDS + SZ 2-0 L6IN ABSRB VLT L36MM SXPP1B409

## (undated) DEVICE — SUTURE VCRL 3-0 L36IN ABSRB VLT CT-1 L36MM 1/2 CIR J344H

## (undated) DEVICE — GOWN,SIRUS,NONRNF,SETINSLV,XL,20/CS: Brand: MEDLINE

## (undated) DEVICE — TRANSFER BAG 300 ML: Brand: HAEMONETICS

## (undated) DEVICE — SYRINGE MED 50ML LUERLOCK TIP

## (undated) DEVICE — SYRINGE MED 10ML RED POLYCARB BRL FIX M LUER CONN FLAT GRP

## (undated) DEVICE — DIGIT TRAP FINGER GRASPING DEVICE LARGE: Brand: DIGIT TRAP

## (undated) DEVICE — SYRINGE MED 10ML LUERLOCK TIP W/O SFTY DISP

## (undated) DEVICE — CATHETER DIAG 5FR L100CM SPEC 3 DRC CRV SZ DBL BRAID WIRE

## (undated) DEVICE — CATHETER COR DIAG 4.0 5FR 100CM 0 SIDE H

## (undated) DEVICE — DRESSING HEMOSTATIC SFT INTVENT W/O SLT DBL WRP QUIKCLOT LF

## (undated) DEVICE — LIQUIBAND RAPID ADHESIVE 36/CS 0.8ML: Brand: MEDLINE

## (undated) DEVICE — BLANKET WRM W25XL64IN NONWOVEN SFT LTWT PLIABLE HYPR

## (undated) DEVICE — SURGICAL PROCEDURE TRAY CRD CATH 3 PRT

## (undated) DEVICE — PRESSURE TUBING: Brand: TRUWAVE

## (undated) DEVICE — TEMP PACING WIRE: Brand: MYO/WIRE

## (undated) DEVICE — PLEDGET SURG W3/16XL0.25IN THK1.65MM PTFE OVL FELT FOR THE

## (undated) DEVICE — PROVE COVER: Brand: UNBRANDED

## (undated) DEVICE — Device: Brand: OMNIWIRE PRESSURE GUIDE WIRE

## (undated) DEVICE — DEVICE TORQ FLRESCNT PNK FOR HEMSTAS VLV

## (undated) DEVICE — SUTURE SZ 7 L18IN NONABSORBABLE SIL CCS L48MM 1/2 CIR STRNM M655G

## (undated) DEVICE — CATH GUID COR JL4.0 6FR 100CM -- LAUNCHER

## (undated) DEVICE — SOLUTION IV 1000ML 140MEQ/L SOD 5MEQ/L K 3MEQ/L MG 27MEQ/L

## (undated) DEVICE — COPILOT BLEEDBACK CONTROL VALVE: Brand: COPILOT

## (undated) DEVICE — VALVE HEMSTAS 9FR POLYCARB L BOR DBL Y ACCS +

## (undated) DEVICE — SPLINT WR VELC FOAM NEUT POS DISP FOR RAD ART ACC SFT STRP

## (undated) DEVICE — GLOVE SURG SZ 65 CRM LTX FREE POLYISOPRENE POLYMER BEAD ANTI

## (undated) DEVICE — 3M™ TEGADERM™ TRANSPARENT FILM DRESSING FRAME STYLE, 1626W, 4 IN X 4-3/4 IN (10 CM X 12 CM), 50/CT 4CT/CASE: Brand: 3M™ TEGADERM™

## (undated) DEVICE — CATHETER GUID 5FR GWIRE 0.058IN COR EXTRA BKUP SUPP 4 ACT

## (undated) DEVICE — PLEDGET SUT SFT OVL 3 8X5 16IN

## (undated) DEVICE — WASTEBAG DRIP/ADAPTER: Brand: MEDLINE INDUSTRIES, INC.

## (undated) DEVICE — GUIDEWIRE VASC L150CM DIA0.035IN TIP L3MM PTFE J CRV FIX

## (undated) DEVICE — HI-TORQUE BALANCE MIDDLEWEIGHT GUIDE WIRE W/HYDROCOAT .014 STRAIGHT TIP 3.0 CM X 190 CM: Brand: HI-TORQUE BALANCE MIDDLEWEIGHT

## (undated) DEVICE — DRAPE KIT RAMAPR RADIATION SHIELD

## (undated) DEVICE — CATH 5F 100CM JR40 -- DXTERITY

## (undated) DEVICE — SYRINGE KIT,PACKAGED,,150FT,MK 7(ANGIO-ARTERION, 150ML SYR KIT W/QFT,MC)(60729385): Brand: MEDRAD® MARK 7 ARTERION DISPOSABLE SYRINGE 150 ML WITH QUICK FILL TUBE

## (undated) DEVICE — SUTURE TICRON DBL ARMED 2 0 CV 305 42IN BLU N ABSRB BRAID 8886303551

## (undated) DEVICE — 3M™ STERI-DRAPE™ SMALL DRAPE WITH ADHESIVE APERTURE 1092 25/BX,4/CS&#X20;: Brand: STERI-DRAPE™

## (undated) DEVICE — CATHETER COR DIAG AMPLATZ R 1.0 CRV 5FR 100CM 0 SIDE H

## (undated) DEVICE — SYRINGE MEDICAL 3ML CLEAR PLASTIC STANDARD NON CONTROL LUERLOCK TIP DISPOSABLE

## (undated) DEVICE — KIT BLWR MISTER 5P 15L W/ TBNG SET IRRIG MIST TO IMPROVE

## (undated) DEVICE — CANISTER, RIGID, 3000CC: Brand: MEDLINE INDUSTRIES, INC.

## (undated) DEVICE — COPE MANDRIL WIRE GUIDE STAINLESS STEEL: Brand: COPE

## (undated) DEVICE — BLADE OPHTH 180DEG CUT SURF BLU STR SHRP DBL BVL GRINDLESS

## (undated) DEVICE — GUIDEWIRE VASC J 1.5 MM 0.035 INX260 CM FIX EXCHANGE INQWIRE

## (undated) DEVICE — SUTURE MCRYL SZ 3-0 L27IN ABSRB UD L24MM PS-1 3/8 CIR PRIM Y936H

## (undated) DEVICE — AVID DUAL STAGE VENOUS DRAINAGE CANNULA: Brand: AVID DUAL STAGE VENOUS DRAINAGE CANNULA

## (undated) DEVICE — SYSTEM ENDOSCP VES HARV W/ TOOL CANN SEAL SHT PRT BLNT TIP

## (undated) DEVICE — GLOVE SURG SZ 85 CRM LTX FREE POLYISOPRENE POLYMER BEAD ANTI

## (undated) DEVICE — SOLUTION IRRIG 1000ML STRL H2O USP PLAS POUR BTL

## (undated) DEVICE — RETRACTOR SURG INSRT SUT HLD OCTOBASE

## (undated) DEVICE — CATHETER DIAG 5FR L100CM LUMN ID0.047IN JR4 CRV 0 SIDE H

## (undated) DEVICE — ADHESIVE SKIN CLOSURE XL 42 CM 2.7 CC MESH LIQUIBAND SECUR

## (undated) DEVICE — LEAD PACE L475MM CHNL A OR V MYOCARDIAL STEROID ELUT SIL

## (undated) DEVICE — GUIDEWIRE VASC L260CM DIA0.035IN TIP L3MM STD EXCHG PTFE J

## (undated) DEVICE — Device

## (undated) DEVICE — SYRINGE MED 10ML PUR GAM COMPATIBLE POLYCARB FIX M LUER CONN

## (undated) DEVICE — DRESSING FOAM W8.7XL9.1IN SAFETAC LAYR SELF ADH MEPILEX

## (undated) DEVICE — HEART CATH-MRMC: Brand: MEDLINE INDUSTRIES, INC.

## (undated) DEVICE — SET TUBE INSUFFLATION HI FLOW PNEUMOCLEAR

## (undated) DEVICE — TOOL INSRT ANGI GDWIRE MTL SS --

## (undated) DEVICE — SOLUTION IV 500ML 0.9% SOD CHL PH 5 INJ USP VIAFLX PLAS

## (undated) DEVICE — SOLUTION IV 1000ML 0.9% SOD CHL PH 5 INJ USP VIAFLX PLAS

## (undated) DEVICE — CANNULA PERF L2IN BLNT TIP 2MM VES CLR RADPQ BODY FEM LUER

## (undated) DEVICE — BAND COMPR L24CM REG CLR PLAS HEMSTAT EXT HK AND LOOP RETEN

## (undated) DEVICE — GLIDESHEATH SLENDER ACCESS KIT: Brand: GLIDESHEATH SLENDER

## (undated) DEVICE — SYRINGE ANGIO 8ML COR CTRL ROT ADPT SLD PLUNG CLR BRL M

## (undated) DEVICE — SET PERF L203CM 12IN RED AND BLU AORT ROOT MULT SLIP CONN

## (undated) DEVICE — SUTURE PROL SZ 7-0 L24IN NONABSORBABLE BLU L8MM BV175-6 3/8 8735H

## (undated) DEVICE — COVER,TABLE,HEAVY DUTY,77"X90",STRL: Brand: MEDLINE

## (undated) DEVICE — SYRINGE ANGIO 10 CC BRL STD PRNT POLYCARB LT BLU MEDALLION

## (undated) DEVICE — SOLUTION IRRIG 1000ML 0.9% SOD CHL USP POUR PLAS BTL

## (undated) DEVICE — CONNECTOR DRNGE 3/8 1/2X3/16X3/16IN BASE L5MM ARM L10-13MM

## (undated) DEVICE — BAND COMPR L29CM XLN CLR PLAS HEMSTAT EXT HK AND LOOP RETEN

## (undated) DEVICE — OPEN HEART A- RICHMOND: Brand: MEDLINE INDUSTRIES, INC.

## (undated) DEVICE — SET TRNSDUC SET UP 3 W

## (undated) DEVICE — GLIDESHEATH SLENDER STAINLESS STEEL KIT: Brand: GLIDESHEATH SLENDER

## (undated) DEVICE — HI-TORQUE VERSACORE FLOPPY GUIDE WIRE SYSTEM 145 CM: Brand: HI-TORQUE VERSACORE

## (undated) DEVICE — TUBING PRSS MON L6IN PVC M FEM CONN

## (undated) DEVICE — MEDI-TRACE CADENCE ADULT, DEFIBRILLATION ELECTRODE -RTS  (10 PR/PK) - PHYSIO-CONTROL: Brand: MEDI-TRACE CADENCE

## (undated) DEVICE — BOWL UTIL 16OZ STRL --

## (undated) DEVICE — SUTURE PROL SZ 5-0 L30IN NONABSORBABLE BLU L13MM RB-2 1/2 8710H

## (undated) DEVICE — 3M™ MEDIPORE™ H SOFT CLOTH SURGICAL TAPE 2864, 4 INCH X 10 YARD (10CM X 9,14M), 12 ROLLS/CASE: Brand: 3M™ MEDIPORE™

## (undated) DEVICE — Device: Brand: JELCO

## (undated) DEVICE — RADIFOCUS OPTITORQUE ANGIOGRAPHIC CATHETER: Brand: OPTITORQUE

## (undated) DEVICE — BANDAGE COMPR W6INXL10YD ST M E WHITE/BEIGE

## (undated) DEVICE — SUTURE STRATAFIX SPRL MCRYL + SZ 3-0 L12IN ABSRB UD PS-2 SXMP1B106

## (undated) DEVICE — GUIDEWIRE VASC L260CM 0.035IN J TIP L3MM PTFE FIX COR NAMIC

## (undated) DEVICE — CANNULA AORT ROOT INTRO STD TIP 5FR OVERALL LEN 55IN DLP

## (undated) DEVICE — CATHETER ANGIO JL4 0.045 INX5 FRX100 CM THRULUMEN EXPO

## (undated) DEVICE — CATH GUID COR JR4.0 6FR 100CM -- LAUNCHER

## (undated) DEVICE — OPEN HEART B-RICHMOND: Brand: MEDLINE INDUSTRIES, INC.

## (undated) DEVICE — SUTURE PROL SZ 4-0 L36IN NONABSORBABLE BLU L26MM SH 1/2 CIR 8521H

## (undated) DEVICE — PRESSURE MONITORING SET: Brand: TRUWAVE

## (undated) DEVICE — DEVICE INFL SYR BLLN ENDO 30 -- INTELLI

## (undated) DEVICE — ANGIOGRAPHIC CATHETER: Brand: IMPULSE™

## (undated) DEVICE — SUTURE VCRL SZ 0 L18IN ABSRB VLT L40MM CT 1/2 CIR J752D

## (undated) DEVICE — 6 FOOT DISPOSABLE EXTENSION CABLE WITH SAFE CONNECT / SCREW-DOWN

## (undated) DEVICE — TUBING PRESS INJ FLX SH 30IN --

## (undated) DEVICE — SYRINGE 20ML LL S/C 50

## (undated) DEVICE — CLIP LIG M BLU TI HRT SHP WIRE HORZ 600 PER BX

## (undated) DEVICE — DRAIN,WOUND,ROUND,24FR,5/16",FULL-FLUTED: Brand: MEDLINE

## (undated) DEVICE — LULU RADIAL/FEMORAL ANGIOGRAPHY SHEET: Brand: CONVERTORS

## (undated) DEVICE — SYRINGE ANGIO 20ML WHT POLYCARB VAC PRSS CAP PLUNG FIX M

## (undated) DEVICE — AORTIC PUNCHES ARE USED TO CREATE A UNIFORM OPENING IN BLOOD VESSELS DURING CARDIOVASCULAR SURGERY. THE VESSEL GRAFT IS INSERTED INTO THE CREATED OPENING AND SUTURED TO THE VESSEL WALL. AORTIC LANCETS ARE USED TO MAKE A SMALL UNIFORM CUT IN A BLOOD VESSEL TO FACILITATE INSERTION OF AN AORTIC PUNCH.  PUNCHES COME IN VARIOUS LENGTHS, DIAMETERS AND TIP CONFIGURATIONS.: Brand: CLEANCUT ROTATING AORTIC PUNCH

## (undated) DEVICE — AGENT HEMSTAT W4XL4IN OXIDIZED REGENERATED CELOS ABSRB SFT

## (undated) DEVICE — SUTURE PROL SZ 6-0 L30IN NONABSORBABLE BLU L13MM C-1 3/8 M8706

## (undated) DEVICE — SPONGE LAPAROTOMY W18XL18IN WHITE STRUNG RADIOPAQUE STERILE

## (undated) DEVICE — SC 3W MP RA OFF PB - PG: Brand: NAMIC

## (undated) DEVICE — KIT,ANTI FOG,W/SPONGE & FLUID,SOFT PACK: Brand: MEDLINE

## (undated) DEVICE — SUTURE ETHBND EXCEL SZ 3-0 L36IN NONABSORBABLE GRN BB L17MM X588H

## (undated) DEVICE — BLADE OPHTH W1.5MM 15DEG ORNG HNDL SHRP SHRP DEL FOR CATRCT

## (undated) DEVICE — DRAIN SURG SGL COLL PT TB FOR ATS BG OASIS

## (undated) DEVICE — SYRINGE MED 30ML STD CLR PLAS LUERLOCK TIP N CTRL DISP

## (undated) DEVICE — GLOVE SURG SZ 7 CRM LTX FREE POLYISOPRENE POLYMER BEAD ANTI